# Patient Record
Sex: FEMALE | Race: WHITE | Employment: FULL TIME | URBAN - METROPOLITAN AREA
[De-identification: names, ages, dates, MRNs, and addresses within clinical notes are randomized per-mention and may not be internally consistent; named-entity substitution may affect disease eponyms.]

---

## 2018-08-19 ENCOUNTER — APPOINTMENT (OUTPATIENT)
Dept: CT IMAGING | Age: 58
End: 2018-08-19
Payer: COMMERCIAL

## 2018-08-19 ENCOUNTER — HOSPITAL ENCOUNTER (EMERGENCY)
Age: 58
Discharge: HOME | End: 2018-08-19
Attending: EMERGENCY MEDICINE
Payer: COMMERCIAL

## 2018-08-19 VITALS
HEIGHT: 63 IN | TEMPERATURE: 98.4 F | HEART RATE: 68 BPM | DIASTOLIC BLOOD PRESSURE: 63 MMHG | RESPIRATION RATE: 15 BRPM | OXYGEN SATURATION: 96 % | BODY MASS INDEX: 49.61 KG/M2 | SYSTOLIC BLOOD PRESSURE: 146 MMHG | WEIGHT: 280 LBS

## 2018-08-19 DIAGNOSIS — G44.209 TENSION HEADACHE: Primary | ICD-10-CM

## 2018-08-19 DIAGNOSIS — M62.838 NECK MUSCLE SPASM: ICD-10-CM

## 2018-08-19 DIAGNOSIS — D72.829 LEUKOCYTOSIS, UNSPECIFIED TYPE: ICD-10-CM

## 2018-08-19 LAB
ANION GAP SERPL CALCULATED.3IONS-SCNC: 11 MMOL/L (ref 7–16)
BASOPHILS ABSOLUTE: 0.07 E9/L (ref 0–0.2)
BASOPHILS RELATIVE PERCENT: 0.5 % (ref 0–2)
BUN BLDV-MCNC: 10 MG/DL (ref 6–20)
CALCIUM SERPL-MCNC: 10.6 MG/DL (ref 8.6–10.2)
CHLORIDE BLD-SCNC: 104 MMOL/L (ref 98–107)
CO2: 25 MMOL/L (ref 22–29)
CREAT SERPL-MCNC: 0.7 MG/DL (ref 0.5–1)
EOSINOPHILS ABSOLUTE: 0.03 E9/L (ref 0.05–0.5)
EOSINOPHILS RELATIVE PERCENT: 0.2 % (ref 0–6)
GFR AFRICAN AMERICAN: >60
GFR NON-AFRICAN AMERICAN: >60 ML/MIN/1.73
GLUCOSE BLD-MCNC: 113 MG/DL (ref 74–109)
HCT VFR BLD CALC: 40.5 % (ref 34–48)
HEMOGLOBIN: 13 G/DL (ref 11.5–15.5)
IMMATURE GRANULOCYTES #: 0.18 E9/L
IMMATURE GRANULOCYTES %: 1.2 % (ref 0–5)
LYMPHOCYTES ABSOLUTE: 3.45 E9/L (ref 1.5–4)
LYMPHOCYTES RELATIVE PERCENT: 22.7 % (ref 20–42)
MCH RBC QN AUTO: 27.8 PG (ref 26–35)
MCHC RBC AUTO-ENTMCNC: 32.1 % (ref 32–34.5)
MCV RBC AUTO: 86.7 FL (ref 80–99.9)
MONOCYTES ABSOLUTE: 1.19 E9/L (ref 0.1–0.95)
MONOCYTES RELATIVE PERCENT: 7.8 % (ref 2–12)
NEUTROPHILS ABSOLUTE: 10.27 E9/L (ref 1.8–7.3)
NEUTROPHILS RELATIVE PERCENT: 67.6 % (ref 43–80)
PDW BLD-RTO: 16.3 FL (ref 11.5–15)
PLATELET # BLD: 387 E9/L (ref 130–450)
PMV BLD AUTO: 10.2 FL (ref 7–12)
POTASSIUM SERPL-SCNC: 3.7 MMOL/L (ref 3.5–5)
RBC # BLD: 4.67 E12/L (ref 3.5–5.5)
SODIUM BLD-SCNC: 140 MMOL/L (ref 132–146)
WBC # BLD: 15.2 E9/L (ref 4.5–11.5)

## 2018-08-19 PROCEDURE — 85025 COMPLETE CBC W/AUTO DIFF WBC: CPT

## 2018-08-19 PROCEDURE — 6360000002 HC RX W HCPCS: Performed by: EMERGENCY MEDICINE

## 2018-08-19 PROCEDURE — 70450 CT HEAD/BRAIN W/O DYE: CPT

## 2018-08-19 PROCEDURE — 96374 THER/PROPH/DIAG INJ IV PUSH: CPT

## 2018-08-19 PROCEDURE — 99284 EMERGENCY DEPT VISIT MOD MDM: CPT

## 2018-08-19 PROCEDURE — 80048 BASIC METABOLIC PNL TOTAL CA: CPT

## 2018-08-19 PROCEDURE — 2580000003 HC RX 258: Performed by: EMERGENCY MEDICINE

## 2018-08-19 RX ORDER — CYCLOBENZAPRINE HCL 5 MG
5 TABLET ORAL 3 TIMES DAILY PRN
Qty: 12 TABLET | Refills: 0 | Status: SHIPPED | OUTPATIENT
Start: 2018-08-19 | End: 2018-08-23

## 2018-08-19 RX ORDER — HYDROXYCHLOROQUINE SULFATE 200 MG/1
200 TABLET, FILM COATED ORAL DAILY
COMMUNITY
End: 2019-10-18 | Stop reason: CLARIF

## 2018-08-19 RX ORDER — LORAZEPAM 2 MG/ML
2 INJECTION INTRAMUSCULAR ONCE
Status: COMPLETED | OUTPATIENT
Start: 2018-08-19 | End: 2018-08-19

## 2018-08-19 RX ORDER — 0.9 % SODIUM CHLORIDE 0.9 %
1000 INTRAVENOUS SOLUTION INTRAVENOUS ONCE
Status: COMPLETED | OUTPATIENT
Start: 2018-08-19 | End: 2018-08-19

## 2018-08-19 RX ORDER — SODIUM CHLORIDE 0.9 % (FLUSH) 0.9 %
10 SYRINGE (ML) INJECTION PRN
Status: DISCONTINUED | OUTPATIENT
Start: 2018-08-19 | End: 2018-08-19 | Stop reason: HOSPADM

## 2018-08-19 RX ADMIN — LORAZEPAM 2 MG: 2 INJECTION INTRAMUSCULAR; INTRAVENOUS at 12:25

## 2018-08-19 RX ADMIN — SODIUM CHLORIDE 1000 ML: 9 INJECTION, SOLUTION INTRAVENOUS at 12:07

## 2018-08-19 ASSESSMENT — ENCOUNTER SYMPTOMS
VOMITING: 0
SHORTNESS OF BREATH: 0
SINUS PRESSURE: 0
NAUSEA: 0
BLOOD IN STOOL: 0
COUGH: 0
CHEST TIGHTNESS: 0
BACK PAIN: 0
ABDOMINAL PAIN: 0
SORE THROAT: 0
SINUS PAIN: 0
DIARRHEA: 0
FACIAL SWELLING: 0
COLOR CHANGE: 0

## 2018-08-19 ASSESSMENT — PAIN SCALES - GENERAL: PAINLEVEL_OUTOF10: 8

## 2018-08-19 ASSESSMENT — PAIN DESCRIPTION - ORIENTATION: ORIENTATION: POSTERIOR

## 2018-08-19 ASSESSMENT — PAIN DESCRIPTION - LOCATION: LOCATION: HEAD;NECK

## 2018-08-19 ASSESSMENT — PAIN DESCRIPTION - DESCRIPTORS: DESCRIPTORS: ACHING

## 2018-08-19 ASSESSMENT — PAIN DESCRIPTION - PAIN TYPE: TYPE: ACUTE PAIN

## 2018-08-19 NOTE — ED PROVIDER NOTES
Patient is a 62-year-old female with a history of migraines, Crohn disease, DVT/PE, hypertension, hypothyroidism presented with a one-week history of headache. She states on Monday of last week she was seen by her PCP for a cough, started on azithromycin. On Tuesday she began developing a headache and was reevaluated, attempted conservative therapy at that time. She does take Imitrex for headaches which she said is minimally improved her symptoms. She was seen again yesterday outpatient for this headache and informed if her pain persists to go to the ER in 24 hours. She denies new or worsening symptoms. Describes her headache as a right-sided sharp like pain radiating from the back of her neck to the front of her head bilaterally. States pain is worse with head movement, lying flat, palpation. Symptoms reported as moderate to severe in severity. No tenderness over the temple. No vision changes, loss of vision, pain or eye movement. She is light and sound sensitive at this time. Was not sudden in onset and is not the worst headache of her life. No recent trauma. She is not anticoagulated. She denies numbness, tingling, weakness, vomiting, chest pain, shortness breath, abdominal pain, syncope, confusion or altered mental status, rash. No sick contacts. The history is provided by the patient. Headache   Pain location:  Frontal, occipital and R parietal  Quality:  Dull and stabbing  Severity currently:  8/10  Severity at highest:  9/10  Onset quality:  Gradual  Duration:  6 days  Timing:  Constant  Progression:  Waxing and waning  Chronicity:  Chronic  Similar to prior headaches: yes    Context: activity, bright light and loud noise    Relieved by:  Prescription medications and resting in a darkened room  Worsened by:   Activity, light, neck movement and sound  Ineffective treatments:  Cold packs  Associated symptoms: neck pain (right lateral)    Associated symptoms: no abdominal pain, no back pain, no musculature tenderness to palpation). She exhibits no edema. Lymphadenopathy:     She has no cervical adenopathy. Neurological: She is alert and oriented to person, place, and time. She has normal strength. She displays no tremor. No cranial nerve deficit or sensory deficit. She exhibits normal muscle tone. Coordination normal.   Reflex Scores:       Brachioradialis reflexes are 2+ on the right side and 2+ on the left side. Achilles reflexes are 2+ on the right side and 2+ on the left side. No nuchal rigidity, no Kernig or Brudzinski sign. Skin: Skin is warm and dry. No rash noted. She is not diaphoretic. No erythema. No pallor. Nursing note and vitals reviewed. Procedures    MDM         --------------------------------------------- PAST HISTORY ---------------------------------------------  Past Medical History:  has a past medical history of Altered bowel elimination due to intestinal ostomy ([facility] [address] Arthritis; Crohn disease ([facility] [address] Hernia; History of DVT (deep vein thrombosis); History of pulmonary embolus (PE); Hypertension; Hypothyroidism; Movement disorder; Obesity; PE (pulmonary embolism); Rash; and Thyroid disease. Past Surgical History:  has a past surgical history that includes Cholecystectomy; Tubal ligation; hernia repair; Endometrial ablation; Tonsillectomy; Colonoscopy; Abdomen surgery (11/2/14); Total knee arthroplasty (Bilateral, 2013); Gastrostomy tube placement; Tunneled venous catheter placement; colectomy; colectomy; joint replacement (11/2013); and ileostomy or jejunostomy. Social History:  reports that she has never smoked. She has never used smokeless tobacco. She reports that she does not drink alcohol or use drugs. Family History: family history includes Hypertension in her mother. The patients home medications have been reviewed. Allergies: Amoxicillin-pot clavulanate; Biaxin [clarithromycin];  Ceclor [cefaclor]; and Macrobid tablet by mouth 3 times daily as needed for Muscle spasms, Disp-12 tablet, R-0Print             Diagnosis:  1. Tension headache    2. Neck muscle spasm    3. Leukocytosis, unspecified type        Disposition:  Patient's disposition: Discharge to home  Patient's condition is stable.          Marie Aguilar DO  Resident  08/19/18 [id number]

## 2019-03-02 LAB
AVERAGE GLUCOSE: NORMAL
CHOLESTEROL, TOTAL: NORMAL
CHOLESTEROL/HDL RATIO: NORMAL
CREATININE: 0.7 MG/DL
HBA1C MFR BLD: 5.6 %
HDLC SERPL-MCNC: NORMAL MG/DL
LDL CHOLESTEROL CALCULATED: NORMAL
POTASSIUM (K+): 4.1
TRIGL SERPL-MCNC: NORMAL MG/DL
VLDLC SERPL CALC-MCNC: NORMAL MG/DL

## 2019-03-16 ENCOUNTER — HOSPITAL ENCOUNTER (OUTPATIENT)
Dept: MRI IMAGING | Age: 59
Discharge: HOME | End: 2019-03-18
Payer: COMMERCIAL

## 2019-03-16 DIAGNOSIS — M48.56XA COLLAPSED VERTEBRA, NOT ELSEWHERE CLASSIFIED, LUMBAR REGION, INITIAL ENCOUNTER FOR FRACTURE (HCC): ICD-10-CM

## 2019-03-16 PROCEDURE — 72148 MRI LUMBAR SPINE W/O DYE: CPT

## 2019-09-04 RX ORDER — LOSARTAN POTASSIUM 100 MG/1
100 TABLET ORAL DAILY
COMMUNITY
End: 2019-09-05 | Stop reason: SDUPTHER

## 2019-09-04 RX ORDER — METOPROLOL SUCCINATE 25 MG/1
25 TABLET, EXTENDED RELEASE ORAL 2 TIMES DAILY
COMMUNITY
End: 2019-09-05

## 2019-09-04 RX ORDER — INFLIXIMAB 100 MG/10ML
800 INJECTION, POWDER, LYOPHILIZED, FOR SOLUTION INTRAVENOUS SEE ADMIN INSTRUCTIONS
COMMUNITY
End: 2022-08-31

## 2019-09-05 ENCOUNTER — OFFICE VISIT (OUTPATIENT)
Dept: PRIMARY CARE CLINIC | Age: 59
End: 2019-09-05
Payer: COMMERCIAL

## 2019-09-05 VITALS
WEIGHT: 264 LBS | TEMPERATURE: 98.3 F | BODY MASS INDEX: 46.78 KG/M2 | HEIGHT: 63 IN | DIASTOLIC BLOOD PRESSURE: 84 MMHG | SYSTOLIC BLOOD PRESSURE: 136 MMHG

## 2019-09-05 DIAGNOSIS — K43.9 VENTRAL HERNIA WITHOUT OBSTRUCTION OR GANGRENE: ICD-10-CM

## 2019-09-05 DIAGNOSIS — E11.9 DIET-CONTROLLED DIABETES MELLITUS (HCC): ICD-10-CM

## 2019-09-05 DIAGNOSIS — E03.9 ACQUIRED HYPOTHYROIDISM: ICD-10-CM

## 2019-09-05 DIAGNOSIS — L30.9 DERMATITIS: ICD-10-CM

## 2019-09-05 DIAGNOSIS — M81.0 AGE-RELATED OSTEOPOROSIS WITHOUT CURRENT PATHOLOGICAL FRACTURE: ICD-10-CM

## 2019-09-05 DIAGNOSIS — Z00.01 ENCOUNTER FOR ANNUAL GENERAL MEDICAL EXAMINATION WITH ABNORMAL FINDINGS IN ADULT: Primary | ICD-10-CM

## 2019-09-05 DIAGNOSIS — I10 ESSENTIAL HYPERTENSION: Chronic | ICD-10-CM

## 2019-09-05 DIAGNOSIS — F41.9 ANXIETY: ICD-10-CM

## 2019-09-05 DIAGNOSIS — K50.10 CROHN'S DISEASE OF COLON WITHOUT COMPLICATION (HCC): Chronic | ICD-10-CM

## 2019-09-05 DIAGNOSIS — E78.2 MIXED HYPERLIPIDEMIA: Primary | ICD-10-CM

## 2019-09-05 PROCEDURE — 99396 PREV VISIT EST AGE 40-64: CPT | Performed by: FAMILY MEDICINE

## 2019-09-05 RX ORDER — ESCITALOPRAM OXALATE 20 MG/1
10 TABLET ORAL DAILY
Qty: 90 TABLET | Refills: 5 | Status: SHIPPED
Start: 2019-09-05 | End: 2020-03-05

## 2019-09-05 RX ORDER — SUMATRIPTAN 100 MG/1
100 TABLET, FILM COATED ORAL
Qty: 9 TABLET | Refills: 12 | Status: SHIPPED
Start: 2019-09-05 | End: 2021-11-29

## 2019-09-05 RX ORDER — LEVOTHYROXINE SODIUM 137 UG/1
137 TABLET ORAL DAILY
Qty: 90 TABLET | Refills: 12 | Status: SHIPPED
Start: 2019-09-05 | End: 2020-09-08 | Stop reason: SDUPTHER

## 2019-09-05 RX ORDER — TRAZODONE HYDROCHLORIDE 50 MG/1
TABLET ORAL
Qty: 180 TABLET | Refills: 5 | Status: SHIPPED
Start: 2019-09-05 | End: 2020-09-08 | Stop reason: SDUPTHER

## 2019-09-05 RX ORDER — LOSARTAN POTASSIUM 100 MG/1
100 TABLET ORAL DAILY
Qty: 90 TABLET | Refills: 5 | Status: SHIPPED
Start: 2019-09-05 | End: 2020-09-08 | Stop reason: SDUPTHER

## 2019-09-05 RX ORDER — POTASSIUM CHLORIDE 20 MEQ/1
20 TABLET, EXTENDED RELEASE ORAL 2 TIMES DAILY
Qty: 180 TABLET | Refills: 5 | Status: SHIPPED
Start: 2019-09-05 | End: 2020-09-08 | Stop reason: SDUPTHER

## 2019-09-05 ASSESSMENT — ENCOUNTER SYMPTOMS
GASTROINTESTINAL NEGATIVE: 1
ALLERGIC/IMMUNOLOGIC NEGATIVE: 1
BACK PAIN: 1
EYES NEGATIVE: 1
RESPIRATORY NEGATIVE: 1

## 2019-09-21 ENCOUNTER — OFFICE VISIT (OUTPATIENT)
Dept: FAMILY MEDICINE CLINIC | Age: 59
End: 2019-09-21
Payer: COMMERCIAL

## 2019-09-21 VITALS
WEIGHT: 264 LBS | OXYGEN SATURATION: 97 % | BODY MASS INDEX: 46.78 KG/M2 | HEIGHT: 63 IN | RESPIRATION RATE: 14 BRPM | HEART RATE: 80 BPM | DIASTOLIC BLOOD PRESSURE: 70 MMHG | SYSTOLIC BLOOD PRESSURE: 130 MMHG | TEMPERATURE: 100.2 F

## 2019-09-21 DIAGNOSIS — J01.90 ACUTE SINUSITIS, RECURRENCE NOT SPECIFIED, UNSPECIFIED LOCATION: Primary | ICD-10-CM

## 2019-09-21 PROCEDURE — 99213 OFFICE O/P EST LOW 20 MIN: CPT | Performed by: INTERNAL MEDICINE

## 2019-09-21 RX ORDER — AZITHROMYCIN 250 MG/1
250 TABLET, FILM COATED ORAL SEE ADMIN INSTRUCTIONS
Qty: 6 TABLET | Refills: 0 | Status: SHIPPED | OUTPATIENT
Start: 2019-09-21 | End: 2019-09-26

## 2019-09-21 RX ORDER — METHYLPREDNISOLONE 4 MG/1
TABLET ORAL
Qty: 1 TABLET | Refills: 0 | Status: SHIPPED | OUTPATIENT
Start: 2019-09-21 | End: 2019-09-27

## 2019-09-21 NOTE — PROGRESS NOTES
RELEASE      CHOLECYSTECTOMY      COLECTOMY      ostomy    COLECTOMY      COLONOSCOPY      ENDOMETRIAL ABLATION      GASTROSTOMY TUBE PLACEMENT      HERNIA REPAIR      ILEOSTOMY OR JEJUNOSTOMY      placed 2/2015 reversed 9/2015    JOINT REPLACEMENT  11/2013    both knees    TONSILLECTOMY      TOTAL KNEE ARTHROPLASTY Bilateral 2013    TUBAL LIGATION      TUNNELED VENOUS CATHETER PLACEMENT       Family History   Problem Relation Age of Onset    Hypertension Mother      Social History     Socioeconomic History    Marital status:      Spouse name: Not on file    Number of children: Not on file    Years of education: Not on file    Highest education level: Not on file   Occupational History     Employer: [employer]   Social Needs    Financial resource strain: Not on file    Food insecurity:     Worry: Not on file     Inability: Not on file    Transportation needs:     Medical: Not on file     Non-medical: Not on file   Tobacco Use    Smoking status: Never Smoker    Smokeless tobacco: Never Used   Substance and Sexual Activity    Alcohol use: No    Drug use: No    Sexual activity: Not on file   Lifestyle    Physical activity:     Days per week: Not on file     Minutes per session: Not on file    Stress: Not on file   Relationships    Social connections:     Talks on phone: Not on file     Gets together: Not on file     Attends Scientology service: Not on file     Active member of club or organization: Not on file     Attends meetings of clubs or organizations: Not on file     Relationship status: Not on file    Intimate partner violence:     Fear of current or ex partner: Not on file     Emotionally abused: Not on file     Physically abused: Not on file     Forced sexual activity: Not on file   Other Topics Concern    Not on file   Social History Narrative        Tetanus Immunization - (8/14/2017)    HYPOTHYROID    HTN    ELEV LFT    FATTY LIVER    S/P UMBILICAL HERNIA OR

## 2019-10-18 ENCOUNTER — OFFICE VISIT (OUTPATIENT)
Dept: SURGERY | Age: 59
End: 2019-10-18
Payer: COMMERCIAL

## 2019-10-18 VITALS
TEMPERATURE: 98 F | SYSTOLIC BLOOD PRESSURE: 152 MMHG | RESPIRATION RATE: 16 BRPM | DIASTOLIC BLOOD PRESSURE: 85 MMHG | WEIGHT: 261.6 LBS | BODY MASS INDEX: 46.35 KG/M2 | HEIGHT: 63 IN | HEART RATE: 66 BPM

## 2019-10-18 DIAGNOSIS — K43.9 VENTRAL HERNIA WITHOUT OBSTRUCTION OR GANGRENE: Primary | Chronic | ICD-10-CM

## 2019-10-18 PROCEDURE — 99203 OFFICE O/P NEW LOW 30 MIN: CPT | Performed by: SURGERY

## 2019-10-21 ENCOUNTER — TELEPHONE (OUTPATIENT)
Dept: SURGERY | Age: 59
End: 2019-10-21

## 2019-10-22 ENCOUNTER — TELEPHONE (OUTPATIENT)
Dept: SURGERY | Age: 59
End: 2019-10-22

## 2019-10-27 ENCOUNTER — HOSPITAL ENCOUNTER (OUTPATIENT)
Dept: CT IMAGING | Age: 59
Discharge: HOME | End: 2019-10-29
Payer: COMMERCIAL

## 2019-10-27 DIAGNOSIS — K43.9 VENTRAL HERNIA WITHOUT OBSTRUCTION OR GANGRENE: Chronic | ICD-10-CM

## 2019-10-27 PROCEDURE — 74177 CT ABD & PELVIS W/CONTRAST: CPT

## 2019-10-27 PROCEDURE — 6360000004 HC RX CONTRAST MEDICATION: Performed by: RADIOLOGY

## 2019-10-27 RX ADMIN — IOHEXOL 50 ML: 240 INJECTION, SOLUTION INTRATHECAL; INTRAVASCULAR; INTRAVENOUS; ORAL at 08:26

## 2019-10-27 RX ADMIN — IOPAMIDOL 110 ML: 755 INJECTION, SOLUTION INTRAVENOUS at 08:26

## 2019-11-08 ENCOUNTER — OFFICE VISIT (OUTPATIENT)
Dept: SURGERY | Age: 59
End: 2019-11-08
Payer: COMMERCIAL

## 2019-11-08 VITALS
DIASTOLIC BLOOD PRESSURE: 93 MMHG | WEIGHT: 260 LBS | HEART RATE: 78 BPM | BODY MASS INDEX: 46.07 KG/M2 | TEMPERATURE: 98.3 F | HEIGHT: 63 IN | RESPIRATION RATE: 18 BRPM | SYSTOLIC BLOOD PRESSURE: 153 MMHG

## 2019-11-08 DIAGNOSIS — K43.9 VENTRAL HERNIA WITHOUT OBSTRUCTION OR GANGRENE: Primary | ICD-10-CM

## 2019-11-08 PROCEDURE — 99213 OFFICE O/P EST LOW 20 MIN: CPT | Performed by: SURGERY

## 2019-11-14 ENCOUNTER — OFFICE VISIT (OUTPATIENT)
Dept: PRIMARY CARE CLINIC | Age: 59
End: 2019-11-14
Payer: COMMERCIAL

## 2019-11-14 ENCOUNTER — TELEPHONE (OUTPATIENT)
Dept: PRIMARY CARE CLINIC | Age: 59
End: 2019-11-14

## 2019-11-14 VITALS
SYSTOLIC BLOOD PRESSURE: 132 MMHG | DIASTOLIC BLOOD PRESSURE: 78 MMHG | BODY MASS INDEX: 46.06 KG/M2 | TEMPERATURE: 97.9 F | HEIGHT: 63 IN

## 2019-11-14 DIAGNOSIS — L30.9 DERMATITIS: Primary | ICD-10-CM

## 2019-11-14 PROCEDURE — 99213 OFFICE O/P EST LOW 20 MIN: CPT | Performed by: FAMILY MEDICINE

## 2019-11-14 RX ORDER — SULCONAZOLE NITRATE 10 MG/G
CREAM TOPICAL
Qty: 1 TUBE | Refills: 12 | Status: SHIPPED | OUTPATIENT
Start: 2019-11-14

## 2019-11-14 RX ORDER — OXICONAZOLE NITRATE 10 MG/G
CREAM TOPICAL
Qty: 1 TUBE | Refills: 12 | Status: SHIPPED | OUTPATIENT
Start: 2019-11-14

## 2019-11-14 ASSESSMENT — ENCOUNTER SYMPTOMS: RESPIRATORY NEGATIVE: 1

## 2019-11-15 ASSESSMENT — PATIENT HEALTH QUESTIONNAIRE - PHQ9
SUM OF ALL RESPONSES TO PHQ QUESTIONS 1-9: 0
1. LITTLE INTEREST OR PLEASURE IN DOING THINGS: 0
2. FEELING DOWN, DEPRESSED OR HOPELESS: 0
SUM OF ALL RESPONSES TO PHQ9 QUESTIONS 1 & 2: 0
SUM OF ALL RESPONSES TO PHQ QUESTIONS 1-9: 0

## 2020-02-10 ENCOUNTER — OFFICE VISIT (OUTPATIENT)
Dept: FAMILY MEDICINE CLINIC | Age: 60
End: 2020-02-10
Payer: COMMERCIAL

## 2020-02-10 ENCOUNTER — HOSPITAL ENCOUNTER (OUTPATIENT)
Age: 60
Discharge: HOME | End: 2020-02-12
Payer: COMMERCIAL

## 2020-02-10 VITALS
WEIGHT: 262 LBS | RESPIRATION RATE: 14 BRPM | TEMPERATURE: 99.9 F | OXYGEN SATURATION: 99 % | BODY MASS INDEX: 46.42 KG/M2 | HEART RATE: 90 BPM | DIASTOLIC BLOOD PRESSURE: 70 MMHG | SYSTOLIC BLOOD PRESSURE: 122 MMHG | HEIGHT: 63 IN

## 2020-02-10 LAB
BILIRUBIN, POC: ABNORMAL
BLOOD URINE, POC: ABNORMAL
CLARITY, POC: CLEAR
COLOR, POC: YELLOW
GLUCOSE URINE, POC: ABNORMAL
KETONES, POC: ABNORMAL
LEUKOCYTE EST, POC: ABNORMAL
NITRITE, POC: ABNORMAL
PH, POC: 6.5
PROTEIN, POC: ABNORMAL
SPECIFIC GRAVITY, POC: 1.01
UROBILINOGEN, POC: 0.2

## 2020-02-10 PROCEDURE — 87077 CULTURE AEROBIC IDENTIFY: CPT

## 2020-02-10 PROCEDURE — 87186 SC STD MICRODIL/AGAR DIL: CPT

## 2020-02-10 PROCEDURE — 81003 URINALYSIS AUTO W/O SCOPE: CPT | Performed by: FAMILY MEDICINE

## 2020-02-10 PROCEDURE — 99213 OFFICE O/P EST LOW 20 MIN: CPT | Performed by: FAMILY MEDICINE

## 2020-02-10 PROCEDURE — 87088 URINE BACTERIA CULTURE: CPT

## 2020-02-10 RX ORDER — GRANULES FOR ORAL 3 G/1
3 POWDER ORAL ONCE
Qty: 1 EACH | Refills: 0 | Status: SHIPPED | OUTPATIENT
Start: 2020-02-10 | End: 2020-02-10

## 2020-02-12 LAB
ORGANISM: ABNORMAL
URINE CULTURE, ROUTINE: ABNORMAL
URINE CULTURE, ROUTINE: ABNORMAL

## 2020-02-15 ENCOUNTER — HOSPITAL ENCOUNTER (EMERGENCY)
Age: 60
Discharge: HOME | End: 2020-02-15
Attending: EMERGENCY MEDICINE
Payer: COMMERCIAL

## 2020-02-15 VITALS
RESPIRATION RATE: 16 BRPM | TEMPERATURE: 98.2 F | OXYGEN SATURATION: 98 % | HEART RATE: 78 BPM | DIASTOLIC BLOOD PRESSURE: 78 MMHG | WEIGHT: 262 LBS | BODY MASS INDEX: 46.42 KG/M2 | SYSTOLIC BLOOD PRESSURE: 165 MMHG | HEIGHT: 63 IN

## 2020-02-15 LAB
ALBUMIN SERPL-MCNC: 4.3 G/DL (ref 3.5–5.2)
ALP BLD-CCNC: 42 U/L (ref 35–104)
ALT SERPL-CCNC: 39 U/L (ref 0–32)
ANION GAP SERPL CALCULATED.3IONS-SCNC: 12 MMOL/L (ref 7–16)
AST SERPL-CCNC: 40 U/L (ref 0–31)
BACTERIA: ABNORMAL /HPF
BASOPHILS ABSOLUTE: 0.07 E9/L (ref 0–0.2)
BASOPHILS RELATIVE PERCENT: 0.7 % (ref 0–2)
BILIRUB SERPL-MCNC: 0.4 MG/DL (ref 0–1.2)
BILIRUBIN URINE: NEGATIVE
BLOOD, URINE: ABNORMAL
BUN BLDV-MCNC: 9 MG/DL (ref 6–20)
CALCIUM SERPL-MCNC: 10 MG/DL (ref 8.6–10.2)
CHLORIDE BLD-SCNC: 100 MMOL/L (ref 98–107)
CLARITY: ABNORMAL
CO2: 25 MMOL/L (ref 22–29)
COLOR: YELLOW
CREAT SERPL-MCNC: 0.7 MG/DL (ref 0.5–1)
EOSINOPHILS ABSOLUTE: 0.4 E9/L (ref 0.05–0.5)
EOSINOPHILS RELATIVE PERCENT: 4 % (ref 0–6)
EPITHELIAL CELLS, UA: ABNORMAL /HPF
GFR AFRICAN AMERICAN: >60
GFR NON-AFRICAN AMERICAN: >60 ML/MIN/1.73
GLUCOSE BLD-MCNC: 99 MG/DL (ref 74–99)
GLUCOSE URINE: NEGATIVE MG/DL
HCT VFR BLD CALC: 38.6 % (ref 34–48)
HEMOGLOBIN: 11.8 G/DL (ref 11.5–15.5)
IMMATURE GRANULOCYTES #: 0.05 E9/L
IMMATURE GRANULOCYTES %: 0.5 % (ref 0–5)
KETONES, URINE: NEGATIVE MG/DL
LEUKOCYTE ESTERASE, URINE: ABNORMAL
LIPASE: 32 U/L (ref 13–60)
LYMPHOCYTES ABSOLUTE: 3.33 E9/L (ref 1.5–4)
LYMPHOCYTES RELATIVE PERCENT: 32.9 % (ref 20–42)
MCH RBC QN AUTO: 25.9 PG (ref 26–35)
MCHC RBC AUTO-ENTMCNC: 30.6 % (ref 32–34.5)
MCV RBC AUTO: 84.6 FL (ref 80–99.9)
MONOCYTES ABSOLUTE: 0.81 E9/L (ref 0.1–0.95)
MONOCYTES RELATIVE PERCENT: 8 % (ref 2–12)
NEUTROPHILS ABSOLUTE: 5.46 E9/L (ref 1.8–7.3)
NEUTROPHILS RELATIVE PERCENT: 53.9 % (ref 43–80)
NITRITE, URINE: NEGATIVE
PDW BLD-RTO: 18.4 FL (ref 11.5–15)
PH UA: 6.5 (ref 5–9)
PLATELET # BLD: 372 E9/L (ref 130–450)
PMV BLD AUTO: 10.3 FL (ref 7–12)
POTASSIUM REFLEX MAGNESIUM: 4 MMOL/L (ref 3.5–5)
PROTEIN UA: 100 MG/DL
RBC # BLD: 4.56 E12/L (ref 3.5–5.5)
RBC UA: ABNORMAL /HPF (ref 0–2)
SODIUM BLD-SCNC: 137 MMOL/L (ref 132–146)
SPECIFIC GRAVITY UA: 1.02 (ref 1–1.03)
TOTAL PROTEIN: 7.8 G/DL (ref 6.4–8.3)
UROBILINOGEN, URINE: 0.2 E.U./DL
WBC # BLD: 10.1 E9/L (ref 4.5–11.5)
WBC UA: ABNORMAL /HPF (ref 0–5)
YEAST: PRESENT /HPF

## 2020-02-15 PROCEDURE — 85025 COMPLETE CBC W/AUTO DIFF WBC: CPT

## 2020-02-15 PROCEDURE — 87088 URINE BACTERIA CULTURE: CPT

## 2020-02-15 PROCEDURE — 81001 URINALYSIS AUTO W/SCOPE: CPT

## 2020-02-15 PROCEDURE — 83690 ASSAY OF LIPASE: CPT

## 2020-02-15 PROCEDURE — 80053 COMPREHEN METABOLIC PANEL: CPT

## 2020-02-15 PROCEDURE — 99283 EMERGENCY DEPT VISIT LOW MDM: CPT

## 2020-02-15 RX ORDER — DULOXETIN HYDROCHLORIDE 30 MG/1
30 CAPSULE, DELAYED RELEASE ORAL DAILY
COMMUNITY

## 2020-02-15 RX ORDER — SULFAMETHOXAZOLE AND TRIMETHOPRIM 800; 160 MG/1; MG/1
1 TABLET ORAL 2 TIMES DAILY
Qty: 6 TABLET | Refills: 0 | Status: SHIPPED | OUTPATIENT
Start: 2020-02-15 | End: 2020-02-18

## 2020-02-15 RX ORDER — FLUCONAZOLE 150 MG/1
150 TABLET ORAL ONCE
Qty: 2 TABLET | Refills: 0 | Status: SHIPPED | OUTPATIENT
Start: 2020-02-15 | End: 2020-02-15

## 2020-02-15 RX ORDER — NYSTATIN 100000 [USP'U]/G
POWDER TOPICAL
Qty: 1 BOTTLE | Refills: 1 | Status: SHIPPED | OUTPATIENT
Start: 2020-02-15 | End: 2021-11-29

## 2020-02-15 ASSESSMENT — ENCOUNTER SYMPTOMS
VOMITING: 0
SHORTNESS OF BREATH: 0
CONSTIPATION: 0
RHINORRHEA: 0
ABDOMINAL PAIN: 0
NAUSEA: 0
DIARRHEA: 0

## 2020-02-15 ASSESSMENT — PAIN DESCRIPTION - PAIN TYPE: TYPE: ACUTE PAIN

## 2020-02-15 ASSESSMENT — PAIN SCALES - GENERAL: PAINLEVEL_OUTOF10: 6

## 2020-02-15 NOTE — ED PROVIDER NOTES
Patient a 16-year-old female with a history of Crohn's presents with dysuria, urgency, frequency for 1 week. Patient was seen by her family doctor with unremarkable urinalysis however urine culture growing greater than 100,000 E. coli that is pansensitive on 2/10/2020. Patient was placed on fosfomycin without any relief. Patient has been followed in the past by urology, as well as dermatology. Patient denies chest pain, shortness of breath, abdominal pain, nausea, vomiting, vertigo, syncope. Patient denies fever chills, low back pain. Patient denies travel or trauma. Review of Systems   Constitutional: Negative for appetite change, chills and fever. HENT: Negative for congestion and rhinorrhea. Eyes: Negative for visual disturbance. Respiratory: Negative for shortness of breath. Cardiovascular: Negative for chest pain, palpitations and leg swelling. Gastrointestinal: Negative for abdominal pain, constipation, diarrhea, nausea and vomiting. Genitourinary: Positive for dysuria, frequency and urgency. Musculoskeletal: Negative for arthralgias and myalgias. Skin: Positive for rash. Neurological: Negative for dizziness, syncope, weakness, numbness and headaches. All other systems reviewed and are negative. Physical Exam  Vitals signs reviewed. Constitutional:       General: She is not in acute distress. Appearance: Normal appearance. She is not ill-appearing or toxic-appearing. HENT:      Head: Normocephalic and atraumatic. Mouth/Throat:      Mouth: Mucous membranes are moist.   Cardiovascular:      Rate and Rhythm: Normal rate and regular rhythm. Pulses: Normal pulses. Heart sounds: Normal heart sounds. No murmur. No gallop. Pulmonary:      Effort: Pulmonary effort is normal. No respiratory distress. Breath sounds: Normal breath sounds. Abdominal:      General: Bowel sounds are normal.      Palpations: Abdomen is soft. Tenderness:  There is no abdominal tenderness. There is no right CVA tenderness, left CVA tenderness, guarding or rebound. Hernia: A hernia is present. Hernia is present in the umbilical area. Musculoskeletal: Normal range of motion. Skin:     General: Skin is warm and dry. Capillary Refill: Capillary refill takes less than 2 seconds. Findings: Rash present. Neurological:      Mental Status: She is alert and oriented to person, place, and time. MDM  Number of Diagnoses or Management Options  Acute cystitis with hematuria:   Intertrigo:   Vaginal yeast infection:   Diagnosis management comments: Patient is a 62-year-old who presents with symptoms concerning for urinary tract infection. Patient with positive culture noted on 2/10 and patient has been on fosfomycin per her PCP. Patient remains symptomatic and sensitivities were reviewed. Patient is pansensitive and medication will be changed today to Bactrim for 3 days, reviewing her when she is and intolerances to medications. Further evaluation shows patient with intertrigo noted around easily reducible umbilical hernia. Patient has been using cream for yeast infection under breasts with good relief however no relief around umbilicus. Patient is followed by dermatology. Will try nystatin powder for intertrigo. Rash is red, not hot, not tender. Appears in the moist area. Patient UA is generally unremarkable for infection however yeast is noted. This may be a cause for her symptoms however positive urine culture is present from 1 week ago. Patient will be given Diflucan 1 dose for treatment of yeast infection. Patient may follow-up with family doctor and neurology as needed. Patient is agreeable to plan and is stable for discharge.   Patient advises risks and when to return to the emergency department, patient voices understanding.       --------------------------------------------- PAST HISTORY ---------------------------------------------  Past Medical History:  has a past medical history of Altered bowel elimination due to intestinal ostomy ([facility] [address] Anemia, Arthritis, Crohn disease ([facility] [address] Fatty liver, Hernia, History of DVT (deep vein thrombosis), History of pulmonary embolus (PE), Hyperlipemia, Hypertension, Hypothyroidism, Intervertebral lumbar disc disorder with myelopathy, lumbar region, Leukocytosis, Movement disorder, MRSA infection, Obesity, Osteoarthritis of both knees, Palpitations, PE (pulmonary embolism), Rash, Syncope, Thyroid disease, Uterine fibroid, and Vitamin D deficiency. Past Surgical History:  has a past surgical history that includes Cholecystectomy; Tubal ligation; hernia repair; Endometrial ablation; Tonsillectomy; Colonoscopy; Abdomen surgery (11/2/14); Total knee arthroplasty (Bilateral, 2013); Gastrostomy tube placement; Tunneled venous catheter placement; colectomy; colectomy; joint replacement (11/2013); ileostomy or jejunostomy; and Carpal tunnel release. Social History:  reports that she has never smoked. She has never used smokeless tobacco. She reports that she does not drink alcohol or use drugs. Family History: family history includes Hypertension in her mother. The patients home medications have been reviewed. Allergies: Amoxicillin; Amoxicillin-pot clavulanate; Biaxin [clarithromycin]; Ceclor [cefaclor]; Clavulanic acid;  Doxycycline; and Macrobid [nitrofurantoin]    -------------------------------------------------- RESULTS -------------------------------------------------  Labs:  Results for orders placed or performed during the hospital encounter of 02/15/20   CBC Auto Differential   Result Value Ref Range    WBC 10.1 4.5 - 11.5 E9/L    RBC 4.56 3.50 - 5.50 E12/L    Hemoglobin 11.8 11.5 - 15.5 g/dL    Hematocrit 38.6 34.0 - 48.0 %    MCV 84.6 80.0 - 99.9 fL    MCH 25.9 (L) 26.0 - 35.0 pg    MCHC 30.6 (L) 32.0 - 34.5 %    RDW 18.4 (H) 11.5 - 15.0 fL    Platelets 629 487 - 431 E9/L    MPV 10.3 7.0 - 12.0 ------------------------- NURSING NOTES AND VITALS REVIEWED ---------------------------  Date / Time Roomed:  2/15/2020  5:25 AM  ED Bed Assignment:  18/18    The nursing notes within the ED encounter and vital signs as below have been reviewed. BP (!) 165/78   Pulse 78   Temp 98.2 °F (36.8 °C) (Oral)   Resp 16   Ht 5' 3\" (1.6 m)   Wt 262 lb (118.8 kg)   SpO2 98%   BMI 46.41 kg/m²   Oxygen Saturation Interpretation: Normal      ------------------------------------------ PROGRESS NOTES ------------------------------------------  6:40 AM  I have spoken with the patient and discussed todays results, in addition to providing specific details for the plan of care and counseling regarding the diagnosis and prognosis. Their questions are answered at this time and they are agreeable with the plan. I discussed at length with them reasons for immediate return here for re evaluation. They will followup with their urology, dermatology and primary care physician by calling their office on Monday.      --------------------------------- ADDITIONAL PROVIDER NOTES ---------------------------------  At this time the patient is without objective evidence of an acute process requiring hospitalization or inpatient management. They have remained hemodynamically stable throughout their entire ED visit and are stable for discharge with outpatient follow-up. The plan has been discussed in detail and they are aware of the specific conditions for emergent return, as well as the importance of follow-up. Discharge Medication List as of 2/15/2020  7:05 AM      START taking these medications    Details   sulfamethoxazole-trimethoprim (BACTRIM DS) 800-160 MG per tablet Take 1 tablet by mouth 2 times daily for 3 days, Disp-6 tablet, R-0Print      nystatin (MYCOSTATIN) 932844 UNIT/GM powder Apply topically 4 times daily. , Disp-1 Bottle, R-1, Print      fluconazole (DIFLUCAN) 150 MG tablet Take 1 tablet by mouth once for 1 dose May repeat in 3-5 days, if symptoms persist or recur., Disp-2 tablet, R-0Print             Diagnosis:  1. Acute cystitis with hematuria    2. Vaginal yeast infection    3. Intertrigo        Disposition:  Patient's disposition: Discharge to home  Patient's condition is stable.     2/15/20, 6:40 AM.    This note is prepared by Addison Hunter MD -[id number]             Addison Hunter MD  Resident  02/15/20 0809

## 2020-02-17 LAB — URINE CULTURE, ROUTINE: NORMAL

## 2020-02-29 ENCOUNTER — HOSPITAL ENCOUNTER (OUTPATIENT)
Age: 60
Discharge: HOME | End: 2020-03-02
Payer: COMMERCIAL

## 2020-02-29 LAB
ALBUMIN SERPL-MCNC: 4 G/DL (ref 3.5–5.2)
ALP BLD-CCNC: 42 U/L (ref 35–104)
ALT SERPL-CCNC: 40 U/L (ref 0–32)
ANION GAP SERPL CALCULATED.3IONS-SCNC: 15 MMOL/L (ref 7–16)
AST SERPL-CCNC: 31 U/L (ref 0–31)
BASOPHILS ABSOLUTE: 0.09 E9/L (ref 0–0.2)
BASOPHILS RELATIVE PERCENT: 0.9 % (ref 0–2)
BILIRUB SERPL-MCNC: 0.3 MG/DL (ref 0–1.2)
BUN BLDV-MCNC: 8 MG/DL (ref 6–20)
CALCIUM SERPL-MCNC: 10.1 MG/DL (ref 8.6–10.2)
CHLORIDE BLD-SCNC: 101 MMOL/L (ref 98–107)
CHOLESTEROL, TOTAL: 186 MG/DL (ref 0–199)
CO2: 25 MMOL/L (ref 22–29)
CREAT SERPL-MCNC: 0.9 MG/DL (ref 0.5–1)
EOSINOPHILS ABSOLUTE: 0.35 E9/L (ref 0.05–0.5)
EOSINOPHILS RELATIVE PERCENT: 3.6 % (ref 0–6)
GFR AFRICAN AMERICAN: >60
GFR NON-AFRICAN AMERICAN: >60 ML/MIN/1.73
GLUCOSE BLD-MCNC: 104 MG/DL (ref 74–99)
HBA1C MFR BLD: 5.9 % (ref 4–5.6)
HCT VFR BLD CALC: 40.9 % (ref 34–48)
HDLC SERPL-MCNC: 57 MG/DL
HEMOGLOBIN: 11.7 G/DL (ref 11.5–15.5)
IMMATURE GRANULOCYTES #: 0.03 E9/L
IMMATURE GRANULOCYTES %: 0.3 % (ref 0–5)
LDL CHOLESTEROL CALCULATED: 97 MG/DL (ref 0–99)
LYMPHOCYTES ABSOLUTE: 3.78 E9/L (ref 1.5–4)
LYMPHOCYTES RELATIVE PERCENT: 39 % (ref 20–42)
MCH RBC QN AUTO: 24.6 PG (ref 26–35)
MCHC RBC AUTO-ENTMCNC: 28.6 % (ref 32–34.5)
MCV RBC AUTO: 86.1 FL (ref 80–99.9)
MONOCYTES ABSOLUTE: 0.87 E9/L (ref 0.1–0.95)
MONOCYTES RELATIVE PERCENT: 9 % (ref 2–12)
NEUTROPHILS ABSOLUTE: 4.58 E9/L (ref 1.8–7.3)
NEUTROPHILS RELATIVE PERCENT: 47.2 % (ref 43–80)
OVALOCYTES: ABNORMAL
PDW BLD-RTO: 18.5 FL (ref 11.5–15)
PLATELET # BLD: 388 E9/L (ref 130–450)
PMV BLD AUTO: 10.8 FL (ref 7–12)
POIKILOCYTES: ABNORMAL
POTASSIUM SERPL-SCNC: 4.3 MMOL/L (ref 3.5–5)
RBC # BLD: 4.75 E12/L (ref 3.5–5.5)
SODIUM BLD-SCNC: 141 MMOL/L (ref 132–146)
T4 TOTAL: 12.9 MCG/DL (ref 4.5–11.7)
TOTAL PROTEIN: 7.7 G/DL (ref 6.4–8.3)
TRIGL SERPL-MCNC: 162 MG/DL (ref 0–149)
TSH SERPL DL<=0.05 MIU/L-ACNC: 1.13 UIU/ML (ref 0.27–4.2)
VITAMIN D 25-HYDROXY: 20 NG/ML (ref 30–100)
VLDLC SERPL CALC-MCNC: 32 MG/DL
WBC # BLD: 9.7 E9/L (ref 4.5–11.5)

## 2020-02-29 PROCEDURE — 80053 COMPREHEN METABOLIC PANEL: CPT

## 2020-02-29 PROCEDURE — 80061 LIPID PANEL: CPT

## 2020-02-29 PROCEDURE — 84436 ASSAY OF TOTAL THYROXINE: CPT

## 2020-02-29 PROCEDURE — 85025 COMPLETE CBC W/AUTO DIFF WBC: CPT

## 2020-02-29 PROCEDURE — 84443 ASSAY THYROID STIM HORMONE: CPT

## 2020-02-29 PROCEDURE — 83036 HEMOGLOBIN GLYCOSYLATED A1C: CPT

## 2020-02-29 PROCEDURE — 36415 COLL VENOUS BLD VENIPUNCTURE: CPT

## 2020-02-29 PROCEDURE — 82306 VITAMIN D 25 HYDROXY: CPT

## 2020-03-05 ENCOUNTER — OFFICE VISIT (OUTPATIENT)
Dept: PRIMARY CARE CLINIC | Age: 60
End: 2020-03-05
Payer: COMMERCIAL

## 2020-03-05 VITALS
SYSTOLIC BLOOD PRESSURE: 134 MMHG | BODY MASS INDEX: 46.07 KG/M2 | HEIGHT: 63 IN | TEMPERATURE: 98.2 F | WEIGHT: 260 LBS | DIASTOLIC BLOOD PRESSURE: 82 MMHG

## 2020-03-05 PROCEDURE — 99214 OFFICE O/P EST MOD 30 MIN: CPT | Performed by: FAMILY MEDICINE

## 2020-03-05 ASSESSMENT — ENCOUNTER SYMPTOMS
EYES NEGATIVE: 1
RESPIRATORY NEGATIVE: 1
ABDOMINAL PAIN: 1
ALLERGIC/IMMUNOLOGIC NEGATIVE: 1

## 2020-03-05 ASSESSMENT — PATIENT HEALTH QUESTIONNAIRE - PHQ9
SUM OF ALL RESPONSES TO PHQ QUESTIONS 1-9: 0
2. FEELING DOWN, DEPRESSED OR HOPELESS: 0
SUM OF ALL RESPONSES TO PHQ QUESTIONS 1-9: 0
SUM OF ALL RESPONSES TO PHQ9 QUESTIONS 1 & 2: 0
1. LITTLE INTEREST OR PLEASURE IN DOING THINGS: 0

## 2020-03-05 NOTE — PROGRESS NOTES
3/5/20  Name: Kady Daly    : 1960    Sex: female    Age: 64 y.o. Subjective:  Chief Complaint: Patient is here for 6 mo ck  Re  Mul isues and lab     Saw  orneals and to see   gs in [facility] and  She putting off  Seeing rheum and    Chg med   nwoon cymbalta insead of lexapro  Bit  Depressed     Lab with  D   Dec  36-20   Chol  176----186     ghb  5-6--5-9      Review of Systems   Constitutional: Negative. HENT: Negative. Eyes: Negative. Respiratory: Negative. Cardiovascular: Negative. Gastrointestinal: Positive for abdominal pain (from hernia at times). Endocrine: Negative. Genitourinary: Negative. Musculoskeletal: Negative. Skin: Negative. Allergic/Immunologic: Negative. Neurological: Negative. Hematological: Negative. Psychiatric/Behavioral: Negative. Current Outpatient Medications:     DULoxetine (CYMBALTA) 30 MG extended release capsule, Take 30 mg by mouth daily, Disp: , Rfl:     nystatin (MYCOSTATIN) 994843 UNIT/GM powder, Apply topically 4 times daily. , Disp: 1 Bottle, Rfl: 1    sulconazole (EXELDERM) 1 % cream, Apply topically bid, Disp: 1 Tube, Rfl: 12    oxiconazole nitrate (OXISTAT) 1 % CREA cream, Bid to breast fold, Disp: 1 Tube, Rfl: 12    metoprolol tartrate (LOPRESSOR) 25 MG tablet, Take 1 tablet by mouth 2 times daily, Disp: 180 tablet, Rfl: 5    traZODone (DESYREL) 50 MG tablet, 2 q hs (Patient taking differently: 40 mg 2 q hs), Disp: 180 tablet, Rfl: 5    SUMAtriptan (IMITREX) 100 MG tablet, Take 1 tablet by mouth once as needed for Migraine (1 prn headache . may repeat two hours later.  max 2 per day and 6 per week), Disp: 9 tablet, Rfl: 12    potassium chloride (KLOR-CON M) 20 MEQ extended release tablet, Take 1 tablet by mouth 2 times daily, Disp: 180 tablet, Rfl: 5    levothyroxine (SYNTHROID) 137 MCG tablet, Take 1 tablet by mouth daily, Disp: 90 tablet, Rfl: 12    losartan (COZAAR) 100 MG tablet, Take 1 tablet by mouth daily, Disp: 90 tablet, Rfl: 5    METHOTREXATE SODIUM IJ, Infuse 22.5 mg intravenously once a week On Mondays for RA, Disp: , Rfl:     inFLIXimab (REMICADE) 100 MG injection, Infuse 800 mg intravenously See Admin Instructions Indications: 800 MG every 5 weeks Over 2 hours every 8 weeks , Disp: , Rfl:     Probiotic Product (PROBIOTIC DAILY PO), Take 1 capsule by mouth daily, Disp: , Rfl:     Methotrexate, Anti-Rheumatic, (METHOTREXATE, PF, SC), Inject 1 mL into the skin once a week, Disp: , Rfl:     magnesium (MAGNESIUM-OXIDE) 250 MG TABS tablet, Take 250 mg by mouth daily, Disp: , Rfl:     Cholecalciferol (VITAMIN D3) 2000 UNITS CAPS, Take 3,000 Int'l Units by mouth daily. , Disp: , Rfl:   Allergies   Allergen Reactions    Amoxicillin Rash    Amoxicillin-Pot Clavulanate Rash    Biaxin [Clarithromycin] Hives and Rash    Ceclor [Cefaclor] Hives and Rash    Clavulanic Acid Rash    Doxycycline Rash    Macrobid [Nitrofurantoin] Nausea And Vomiting     Social History     Socioeconomic History    Marital status:      Spouse name: Not on file    Number of children: Not on file    Years of education: Not on file    Highest education level: Not on file   Occupational History     Employer: [employer]   Social Needs    Financial resource strain: Not on file    Food insecurity:     Worry: Not on file     Inability: Not on file    Transportation needs:     Medical: Not on file     Non-medical: Not on file   Tobacco Use    Smoking status: Never Smoker    Smokeless tobacco: Never Used   Substance and Sexual Activity    Alcohol use: No    Drug use: No    Sexual activity: Not on file   Lifestyle    Physical activity:     Days per week: Not on file     Minutes per session: Not on file    Stress: Not on file   Relationships    Social connections:     Talks on phone: Not on file     Gets together: Not on file     Attends Gnosticism service: Not on file     Active member of club or organization: Not on file     Attends meetings of clubs or organizations: Not on file     Relationship status: Not on file    Intimate partner violence:     Fear of current or ex partner: Not on file     Emotionally abused: Not on file     Physically abused: Not on file     Forced sexual activity: Not on file   Other Topics Concern    Not on file   Social History Narrative        Tetanus Immunization - (8/14/2017)    HYPOTHYROID    HTN    ELEV LFT    FATTY LIVER    S/P UMBILICAL HERNIA OR AND SUBSEQUEST INCISIONAL HERNIA OR 8-06    LEUKOCYTOSIS JEMMA    CTS NO OR    OA KNEES SYNVISC----DR ODONNELL    ALLERGY TO DISSOLVABLE SUTURES    UTERINE FIBROID    ----OSP---STATE half-way---Gotta'go Personal Care Device--3    WORKS [employer]    OBESITY    UTERINE ABLATION    LOW VIT D 7-10    ECHO 7 -10 STAGE ONE SYED DYSFX    PALP---DR TERRY    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     5-12----DIV 5-13    COLON 1-13 WITH CINDY---TOLD ULCERATIVE COLITIS--NO HELP WITH ASACOL AND BX NEG    DC WTIH INFL BOWEL DIS (CROHNS) AND JOINT SWELLING---REGULE IV STEROIDS---DC ON    PRED AND ANEMIA    STARTED HUMIRA 5-13    BILATERAL TOTAL KNEE DR ODONNELL 11-13    ADMIT 3-14 WITH FLARE OF CROHNS DIS    ADMIT WITH BRONCHITIS 4-14    FLARE CROHNS DIS    COLONOSCOPY 10-14 [facility]----ACTIVE CROHNS COLITIS IN SIGMOID    ADMIT 11-14 WITH VENTRAL WALL ABSCESS AND POSS FISTULA    SYNCOPE 11-14 WITH LACERATION SCALP    OR [facility] 2-11-15---- FOR PARTIAL BOWEL RESECTION--- PEG TUBE IN----DUE TO INFECTED    MESH    admit 5-15 with UTI SEPSIS    ILEOSTOMY REVERSAL 9-15 [facility]    MRSA UTI 10-15    START REMICADE 12-15    eval dr Shine Haley  3-19 with shots back   Mri with 5 herniated disc      Past Medical History:   Diagnosis Date    Altered bowel elimination due to intestinal ostomy ([facility] [address]     Anemia     Arthritis     Crohn disease ([facility] [address]     Fatty liver     Hernia     History of DVT (deep vein thrombosis) 6/10/2015    History of pulmonary embolus (PE) 6/10/2015    Hyperlipemia     Hypertension     Hypothyroidism     Intervertebral lumbar disc disorder with myelopathy, lumbar region     Leukocytosis     Movement disorder     MRSA infection     UTI    Obesity     Osteoarthritis of both knees     Palpitations     PE (pulmonary embolism)     Rash     fine skin rash not itchy gastro dr aware    Syncope     Thyroid disease     Uterine fibroid     Vitamin D deficiency      Family History   Problem Relation Age of Onset    Hypertension Mother       Past Surgical History:   Procedure Laterality Date    ABDOMEN SURGERY  11/2/14    I&d abdominal wound    CARPAL TUNNEL RELEASE      CHOLECYSTECTOMY      COLECTOMY      ostomy    COLECTOMY      COLONOSCOPY      ENDOMETRIAL ABLATION      GASTROSTOMY TUBE PLACEMENT      HERNIA REPAIR      ILEOSTOMY OR JEJUNOSTOMY      placed 2/2015 reversed 9/2015    JOINT REPLACEMENT  11/2013    both knees    TONSILLECTOMY      TOTAL KNEE ARTHROPLASTY Bilateral 2013    TUBAL LIGATION      TUNNELED VENOUS CATHETER PLACEMENT        Vitals:    03/05/20 0817   BP: 134/82   Temp: 98.2 °F (36.8 °C)   Weight: 260 lb (117.9 kg)   Height: 5' 3\" (1.6 m)       Objective:    Physical Exam  Vitals signs reviewed. Constitutional:       Appearance: She is well-developed. HENT:      Head: Normocephalic. Eyes:      Pupils: Pupils are equal, round, and reactive to light. Neck:      Musculoskeletal: Normal range of motion. Cardiovascular:      Rate and Rhythm: Normal rate and regular rhythm. Pulmonary:      Effort: Pulmonary effort is normal.      Breath sounds: Normal breath sounds. Abdominal:      Palpations: Abdomen is soft. Hernia: A hernia (large ventral hernia) is present. Musculoskeletal: Normal range of motion. Skin:     General: Skin is warm. Neurological:      Mental Status: She is alert and oriented to person, place, and time.

## 2020-08-24 ENCOUNTER — HOSPITAL ENCOUNTER (OUTPATIENT)
Age: 60
Discharge: HOME | End: 2020-08-26
Payer: COMMERCIAL

## 2020-08-24 LAB
ALBUMIN SERPL-MCNC: 4.1 G/DL (ref 3.5–5.2)
ALP BLD-CCNC: 42 U/L (ref 35–104)
ALT SERPL-CCNC: 29 U/L (ref 0–32)
ANION GAP SERPL CALCULATED.3IONS-SCNC: 12 MMOL/L (ref 7–16)
AST SERPL-CCNC: 34 U/L (ref 0–31)
BACTERIA: ABNORMAL /HPF
BASOPHILS ABSOLUTE: 0.09 E9/L (ref 0–0.2)
BASOPHILS RELATIVE PERCENT: 1 % (ref 0–2)
BILIRUB SERPL-MCNC: 0.7 MG/DL (ref 0–1.2)
BILIRUBIN URINE: NEGATIVE
BLOOD, URINE: ABNORMAL
BUN BLDV-MCNC: 9 MG/DL (ref 6–20)
CALCIUM SERPL-MCNC: 10.2 MG/DL (ref 8.6–10.2)
CHLORIDE BLD-SCNC: 99 MMOL/L (ref 98–107)
CHOLESTEROL, TOTAL: 172 MG/DL (ref 0–199)
CLARITY: CLEAR
CO2: 25 MMOL/L (ref 22–29)
COLOR: YELLOW
CREAT SERPL-MCNC: 0.8 MG/DL (ref 0.5–1)
EOSINOPHILS ABSOLUTE: 0.38 E9/L (ref 0.05–0.5)
EOSINOPHILS RELATIVE PERCENT: 4.2 % (ref 0–6)
GFR AFRICAN AMERICAN: >60
GFR NON-AFRICAN AMERICAN: >60 ML/MIN/1.73
GLUCOSE BLD-MCNC: 99 MG/DL (ref 74–99)
GLUCOSE URINE: NEGATIVE MG/DL
HBA1C MFR BLD: 5.8 % (ref 4–5.6)
HCT VFR BLD CALC: 39.9 % (ref 34–48)
HDLC SERPL-MCNC: 53 MG/DL
HEMOGLOBIN: 11.9 G/DL (ref 11.5–15.5)
IMMATURE GRANULOCYTES #: 0.04 E9/L
IMMATURE GRANULOCYTES %: 0.4 % (ref 0–5)
KETONES, URINE: NEGATIVE MG/DL
LDL CHOLESTEROL CALCULATED: 82 MG/DL (ref 0–99)
LEUKOCYTE ESTERASE, URINE: ABNORMAL
LYMPHOCYTES ABSOLUTE: 3.79 E9/L (ref 1.5–4)
LYMPHOCYTES RELATIVE PERCENT: 41.5 % (ref 20–42)
MCH RBC QN AUTO: 25 PG (ref 26–35)
MCHC RBC AUTO-ENTMCNC: 29.8 % (ref 32–34.5)
MCV RBC AUTO: 83.8 FL (ref 80–99.9)
MONOCYTES ABSOLUTE: 0.78 E9/L (ref 0.1–0.95)
MONOCYTES RELATIVE PERCENT: 8.5 % (ref 2–12)
NEUTROPHILS ABSOLUTE: 4.05 E9/L (ref 1.8–7.3)
NEUTROPHILS RELATIVE PERCENT: 44.4 % (ref 43–80)
NITRITE, URINE: NEGATIVE
PDW BLD-RTO: 18.6 FL (ref 11.5–15)
PH UA: 7 (ref 5–9)
PLATELET # BLD: 393 E9/L (ref 130–450)
PMV BLD AUTO: 10.6 FL (ref 7–12)
POTASSIUM SERPL-SCNC: 4 MMOL/L (ref 3.5–5)
PROTEIN UA: NEGATIVE MG/DL
RBC # BLD: 4.76 E12/L (ref 3.5–5.5)
RBC UA: ABNORMAL /HPF (ref 0–2)
RENAL EPITHELIAL, UA: ABNORMAL /HPF
SODIUM BLD-SCNC: 136 MMOL/L (ref 132–146)
SPECIFIC GRAVITY UA: 1.01 (ref 1–1.03)
T4 TOTAL: 15.5 MCG/DL (ref 4.5–11.7)
TOTAL PROTEIN: 7.9 G/DL (ref 6.4–8.3)
TRIGL SERPL-MCNC: 184 MG/DL (ref 0–149)
TSH SERPL DL<=0.05 MIU/L-ACNC: 0.54 UIU/ML (ref 0.27–4.2)
UROBILINOGEN, URINE: 0.2 E.U./DL
VITAMIN D 25-HYDROXY: 24 NG/ML (ref 30–100)
VLDLC SERPL CALC-MCNC: 37 MG/DL
WBC # BLD: 9.1 E9/L (ref 4.5–11.5)
WBC UA: >20 /HPF (ref 0–5)

## 2020-08-24 PROCEDURE — 85025 COMPLETE CBC W/AUTO DIFF WBC: CPT

## 2020-08-24 PROCEDURE — 81001 URINALYSIS AUTO W/SCOPE: CPT

## 2020-08-24 PROCEDURE — 82306 VITAMIN D 25 HYDROXY: CPT

## 2020-08-24 PROCEDURE — 84443 ASSAY THYROID STIM HORMONE: CPT

## 2020-08-24 PROCEDURE — 80053 COMPREHEN METABOLIC PANEL: CPT

## 2020-08-24 PROCEDURE — 84436 ASSAY OF TOTAL THYROXINE: CPT

## 2020-08-24 PROCEDURE — 83036 HEMOGLOBIN GLYCOSYLATED A1C: CPT

## 2020-08-24 PROCEDURE — 36415 COLL VENOUS BLD VENIPUNCTURE: CPT

## 2020-08-24 PROCEDURE — 80061 LIPID PANEL: CPT

## 2020-09-01 ENCOUNTER — OFFICE VISIT (OUTPATIENT)
Dept: PRIMARY CARE CLINIC | Age: 60
End: 2020-09-01
Payer: COMMERCIAL

## 2020-09-01 VITALS
WEIGHT: 241 LBS | BODY MASS INDEX: 42.69 KG/M2 | SYSTOLIC BLOOD PRESSURE: 138 MMHG | DIASTOLIC BLOOD PRESSURE: 82 MMHG | TEMPERATURE: 97.7 F

## 2020-09-01 PROCEDURE — 99214 OFFICE O/P EST MOD 30 MIN: CPT | Performed by: FAMILY MEDICINE

## 2020-09-01 RX ORDER — CEFDINIR 300 MG/1
300 CAPSULE ORAL 2 TIMES DAILY
Qty: 20 CAPSULE | Refills: 0 | Status: SHIPPED
Start: 2020-09-01 | End: 2020-09-08

## 2020-09-01 RX ORDER — HYDROCORTISONE AND ACETIC ACID 20.75; 10.375 MG/ML; MG/ML
3 SOLUTION AURICULAR (OTIC) 2 TIMES DAILY
Qty: 1 BOTTLE | Refills: 2 | Status: SHIPPED
Start: 2020-09-01 | End: 2020-09-08

## 2020-09-01 ASSESSMENT — ENCOUNTER SYMPTOMS
ROS SKIN COMMENTS: SEE  HPI
TROUBLE SWALLOWING: 0
SINUS PRESSURE: 1
RESPIRATORY NEGATIVE: 1
SINUS PAIN: 1
SORE THROAT: 0

## 2020-09-01 ASSESSMENT — PATIENT HEALTH QUESTIONNAIRE - PHQ9
2. FEELING DOWN, DEPRESSED OR HOPELESS: 0
SUM OF ALL RESPONSES TO PHQ9 QUESTIONS 1 & 2: 0
SUM OF ALL RESPONSES TO PHQ QUESTIONS 1-9: 0
SUM OF ALL RESPONSES TO PHQ QUESTIONS 1-9: 0
1. LITTLE INTEREST OR PLEASURE IN DOING THINGS: 0

## 2020-09-01 NOTE — PROGRESS NOTES
20  Name: Edward Leonard    : 1960    Sex: female    Age: 64 y.o. Subjective:  Chief Complaint: Patient is here for sinus  draigange     yel mucus nose      Left  Ear ache     No   Temp chills  No cp o rsob          Heading ok    Left antr first toe  Open area for a week after scratching it    bilat itchy rash both axiallaee  feow  fe w days      Review of Systems   Constitutional: Negative. HENT: Positive for congestion (nasal congestion), ear pain, sinus pressure and sinus pain. Negative for sore throat and trouble swallowing. Respiratory: Negative. Cardiovascular: Negative. Skin:        See  hpi         Current Outpatient Medications:     cefdinir (OMNICEF) 300 MG capsule, Take 1 capsule by mouth 2 times daily for 10 days Ok with augmentin allergy-- pt states no long er allergic to pcn per allergist, Disp: 20 capsule, Rfl: 0    acetic acid-hydrocortisone (VOSOL-HC) 1-2 % otic solution, Place 3 drops into the left ear 2 times daily for 10 days, Disp: 1 Bottle, Rfl: 2    DULoxetine (CYMBALTA) 30 MG extended release capsule, Take 30 mg by mouth daily, Disp: , Rfl:     nystatin (MYCOSTATIN) 918548 UNIT/GM powder, Apply topically 4 times daily. , Disp: 1 Bottle, Rfl: 1    sulconazole (EXELDERM) 1 % cream, Apply topically bid, Disp: 1 Tube, Rfl: 12    oxiconazole nitrate (OXISTAT) 1 % CREA cream, Bid to breast fold, Disp: 1 Tube, Rfl: 12    metoprolol tartrate (LOPRESSOR) 25 MG tablet, Take 1 tablet by mouth 2 times daily, Disp: 180 tablet, Rfl: 5    traZODone (DESYREL) 50 MG tablet, 2 q hs (Patient taking differently: 40 mg 2 q hs), Disp: 180 tablet, Rfl: 5    SUMAtriptan (IMITREX) 100 MG tablet, Take 1 tablet by mouth once as needed for Migraine (1 prn headache . may repeat two hours later.  max 2 per day and 6 per week), Disp: 9 tablet, Rfl: 12    potassium chloride (KLOR-CON M) 20 MEQ extended release tablet, Take 1 tablet by mouth 2 times daily, Disp: 180 tablet, Rfl: 5   Not on file     Attends Gnosticism service: Not on file     Active member of club or organization: Not on file     Attends meetings of clubs or organizations: Not on file     Relationship status: Not on file    Intimate partner violence     Fear of current or ex partner: Not on file     Emotionally abused: Not on file     Physically abused: Not on file     Forced sexual activity: Not on file   Other Topics Concern    Not on file   Social History Narrative        Tetanus Immunization - (8/14/2017)    HYPOTHYROID    HTN    ELEV LFT    FATTY LIVER    S/P UMBILICAL HERNIA OR AND SUBSEQUEST INCISIONAL HERNIA OR 8-06    LEUKOCYTOSIS JEMMA    CTS NO OR    OA KNEES SYNVISC----DR ODONNELL    ALLERGY TO DISSOLVABLE SUTURES    UTERINE FIBROID    ----[facility]--3    WORKS [employer]ICE DEPT    OBESITY    UTERINE ABLATION    LOW VIT D 7-10    ECHO 7 -10 STAGE ONE SYED DYSFX    PALP---DR TERRY    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     5-12----DIV 5-13    COLON 1-13 WITH YURICH---TOLD ULCERATIVE COLITIS--NO HELP WITH ASACOL AND BX NEG    DC WTIH INFL BOWEL DIS (CROHNS) AND JOINT SWELLING---REGULE IV STEROIDS---DC ON    PRED AND ANEMIA    STARTED HUMIRA 5-13    BILATERAL TOTAL KNEE DR ODONNELL 11-13    ADMIT 3-14 WITH FLARE OF CROHNS DIS    ADMIT WITH BRONCHITIS 4-14    FLARE CROHNS DIS    COLONOSCOPY 10-14 [facility]----ACTIVE CROHNS COLITIS IN SIGMOID    ADMIT 11-14 WITH VENTRAL WALL ABSCESS AND POSS FISTULA    SYNCOPE 11-14 WITH LACERATION SCALP    OR [facility] 2-11-15---- FOR PARTIAL BOWEL RESECTION--- PEG TUBE IN----DUE TO INFECTED    MESH    admit 5-15 with UTI SEPSIS    ILEOSTOMY REVERSAL 9-15 [facility]    MRSA UTI 10-15    START REMICADE 12-15    eval dr Daisy Lima  3-19 with shots back   Mri with 5 herniated disc      Past Medical History:   Diagnosis Date    Altered bowel elimination due to intestinal ostomy ([address]     Anemia     Arthritis     Cro[facility] [address]     Fatty liver     Hernia     History of DVT (deep vein thrombosis) 6/10/2015    History of pulmonary embolus (PE) 6/10/2015    Hyperlipemia     Hypertension     Hypothyroidism     Intervertebral lumbar disc disorder with myelopathy, lumbar region     Leukocytosis     Movement disorder     MRSA infection     UTI    Obesity     Osteoarthritis of both knees     Palpitations     PE (pulmonary embolism)     Rash     fine skin rash not itchy gastro dr aware    Syncope     Thyroid disease     Uterine fibroid     Vitamin D deficiency      Family History   Problem Relation Age of Onset    Hypertension Mother       Past Surgical History:   Procedure Laterality Date    ABDOMEN SURGERY  11/2/14    I&d abdominal wound    CARPAL TUNNEL RELEASE      CHOLECYSTECTOMY      COLECTOMY      ostomy    COLECTOMY      COLONOSCOPY      ENDOMETRIAL ABLATION      GASTROSTOMY TUBE PLACEMENT      HERNIA REPAIR      ILEOSTOMY OR JEJUNOSTOMY      placed 2/2015 reversed 9/2015    JOINT REPLACEMENT  11/2013    both knees    TONSILLECTOMY      TOTAL KNEE ARTHROPLASTY Bilateral 2013    TUBAL LIGATION      TUNNELED VENOUS CATHETER PLACEMENT        Vitals:    09/01/20 1512   BP: 138/82   Temp: 97.7 °F (36.5 °C)   TempSrc: Temporal   Weight: 241 lb (109.3 kg)       Objective:    Physical Exam  Vitals signs reviewed. Constitutional:       Appearance: She is well-developed. HENT:      Head: Normocephalic. Ears:      Comments: Left tm erythema  Eyes:      Pupils: Pupils are equal, round, and reactive to light. Neck:      Musculoskeletal: Normal range of motion. Cardiovascular:      Rate and Rhythm: Normal rate and regular rhythm. Pulmonary:      Effort: Pulmonary effort is normal.      Breath sounds: Normal breath sounds. Musculoskeletal: Normal range of motion.    Skin:     Comments: Left antr first toe with suma  And discuarge with mild open area incenter    Mild suma both ax Neurological:      Mental Status: She is alert and oriented to person, place, and time. Psychiatric:         Behavior: Behavior normal.         Lupe Hill was seen today for sinusitis and otalgia. Diagnoses and all orders for this visit:    Acute non-recurrent sinusitis of other sinus  -     cefdinir (OMNICEF) 300 MG capsule; Take 1 capsule by mouth 2 times daily for 10 days Ok with augmentin allergy-- pt states no long er allergic to pcn per allergist    Cellulitis of great toe, left  -     cefdinir (OMNICEF) 300 MG capsule; Take 1 capsule by mouth 2 times daily for 10 days Ok with augmentin allergy-- pt states no long er allergic to pcn per allergist    Dermatitis    Essential hypertension    Acute otitis media, unspecified otitis media type  -     acetic acid-hydrocortisone (VOSOL-HC) 1-2 % otic solution; Place 3 drops into the left ear 2 times daily for 10 days        Comments: ab     Sees  juanita ames for reg appt  May need podiatry  If not healed   A great deal of time spent reviewing medications, diet, exercise, social issues. Also reviewing the chart before entering the room with patient and finishing charting after leaving patient's room. More than half of that time was spent face to face with the patient in counseling and coordinating care. Follow Up: Return for Reg Appt.      Seen by:  Adrienne Clement DO

## 2020-09-08 ENCOUNTER — OFFICE VISIT (OUTPATIENT)
Dept: PRIMARY CARE CLINIC | Age: 60
End: 2020-09-08
Payer: COMMERCIAL

## 2020-09-08 VITALS
SYSTOLIC BLOOD PRESSURE: 132 MMHG | HEIGHT: 63 IN | BODY MASS INDEX: 43.23 KG/M2 | WEIGHT: 244 LBS | TEMPERATURE: 97.6 F | DIASTOLIC BLOOD PRESSURE: 78 MMHG

## 2020-09-08 PROBLEM — F41.9 ANXIETY: Status: ACTIVE | Noted: 2020-09-08

## 2020-09-08 PROCEDURE — 99396 PREV VISIT EST AGE 40-64: CPT | Performed by: FAMILY MEDICINE

## 2020-09-08 RX ORDER — POTASSIUM CHLORIDE 20 MEQ/1
20 TABLET, EXTENDED RELEASE ORAL 2 TIMES DAILY
Qty: 180 TABLET | Refills: 5 | Status: SHIPPED
Start: 2020-09-08 | End: 2020-12-08 | Stop reason: SDUPTHER

## 2020-09-08 RX ORDER — LEVOTHYROXINE SODIUM 0.12 MG/1
125 TABLET ORAL DAILY
Qty: 90 TABLET | Refills: 12 | Status: SHIPPED
Start: 2020-09-08 | End: 2020-12-08 | Stop reason: SDUPTHER

## 2020-09-08 RX ORDER — LOSARTAN POTASSIUM 100 MG/1
100 TABLET ORAL DAILY
Qty: 90 TABLET | Refills: 5 | Status: SHIPPED
Start: 2020-09-08 | End: 2021-01-04 | Stop reason: SDUPTHER

## 2020-09-08 RX ORDER — PREDNISONE 10 MG/1
TABLET ORAL
Qty: 18 TABLET | Refills: 0 | Status: SHIPPED
Start: 2020-09-08 | End: 2020-12-08

## 2020-09-08 RX ORDER — TRAZODONE HYDROCHLORIDE 50 MG/1
TABLET ORAL
Qty: 180 TABLET | Refills: 5 | Status: SHIPPED
Start: 2020-09-08 | End: 2021-01-04 | Stop reason: SDUPTHER

## 2020-09-08 ASSESSMENT — ENCOUNTER SYMPTOMS
EYES NEGATIVE: 1
ALLERGIC/IMMUNOLOGIC NEGATIVE: 1
GASTROINTESTINAL NEGATIVE: 1
RESPIRATORY NEGATIVE: 1

## 2020-09-08 NOTE — PROGRESS NOTES
20  Name: Sara Desir    : 1960    Sex: female    Age: 64 y.o. Subjective:  Chief Complaint: Patient is here for 6 mo  Ck   Re   bp    crohns dis    Thyroid  w eight  And nto feeling well and sore on toe    6 m ock  But also  Re ck  Form  Last week    Feels otu of sorts   No spec   Sx     Feels dry a lot    No  Cp  Or sob     Derm  Gave clinda topical for  Face  Pt request pred taper      Review of Systems   Constitutional: Negative. HENT: Negative. Eyes: Negative. Respiratory: Negative. Cardiovascular: Negative. Gastrointestinal: Negative. Endocrine: Negative. Genitourinary: Negative. Musculoskeletal: Positive for arthralgias. Skin: Negative. Blotches abd   Allergic/Immunologic: Negative. Neurological: Negative. Hematological: Negative. Psychiatric/Behavioral: Negative. Current Outpatient Medications:     traZODone (DESYREL) 50 MG tablet, 2 q hs, Disp: 180 tablet, Rfl: 5    losartan (COZAAR) 100 MG tablet, Take 1 tablet by mouth daily, Disp: 90 tablet, Rfl: 5    levothyroxine (SYNTHROID) 125 MCG tablet, Take 1 tablet by mouth daily, Disp: 90 tablet, Rfl: 12    potassium chloride (KLOR-CON M) 20 MEQ extended release tablet, Take 1 tablet by mouth 2 times daily, Disp: 180 tablet, Rfl: 5    metoprolol tartrate (LOPRESSOR) 25 MG tablet, Take 1 tablet by mouth 2 times daily, Disp: 180 tablet, Rfl: 5    DULoxetine (CYMBALTA) 30 MG extended release capsule, Take 30 mg by mouth daily, Disp: , Rfl:     nystatin (MYCOSTATIN) 739421 UNIT/GM powder, Apply topically 4 times daily. , Disp: 1 Bottle, Rfl: 1    sulconazole (EXELDERM) 1 % cream, Apply topically bid, Disp: 1 Tube, Rfl: 12    oxiconazole nitrate (OXISTAT) 1 % CREA cream, Bid to breast fold, Disp: 1 Tube, Rfl: 12    SUMAtriptan (IMITREX) 100 MG tablet, Take 1 tablet by mouth once as needed for Migraine (1 prn headache . may repeat two hours later.  max 2 per day and 6 per week), Disp: 9 tablet, Rfl: 12    inFLIXimab (REMICADE) 100 MG injection, Infuse 800 mg intravenously See Admin Instructions Indications: 800 MG every 5 weeks Over 2 hours every 8 weeks , Disp: , Rfl:     Methotrexate, Anti-Rheumatic, (METHOTREXATE, PF, SC), Inject 1 mL into the skin once a week, Disp: , Rfl:     magnesium (MAGNESIUM-OXIDE) 250 MG TABS tablet, Take 250 mg by mouth daily, Disp: , Rfl:     Cholecalciferol (VITAMIN D3) 2000 UNITS CAPS, Take 3,000 Int'l Units by mouth daily. , Disp: , Rfl:   Allergies   Allergen Reactions    Amoxicillin-Pot Clavulanate Rash    Biaxin [Clarithromycin] Hives and Rash    Cefaclor Hives and Rash     rash    Clavulanic Acid Rash    Doxycycline Rash    Macrobid [Nitrofurantoin] Nausea And Vomiting     Social History     Socioeconomic History    Marital status:      Spouse name: Not on file    Number of children: Not on file    Years of education: Not on file    Highest education level: Not on file   Occupational History     Employer: [employer]   Social Needs    Financial resource strain: Not on file    Food insecurity     Worry: Not on file     Inability: Not on file    Transportation needs     Medical: Not on file     Non-medical: Not on file   Tobacco Use    Smoking status: Never Smoker    Smokeless tobacco: Never Used   Substance and Sexual Activity    Alcohol use: No    Drug use: No    Sexual activity: Not on file   Lifestyle    Physical activity     Days per week: Not on file     Minutes per session: Not on file    Stress: Not on file   Relationships    Social connections     Talks on phone: Not on file     Gets together: Not on file     Attends Rastafari service: Not on file     Active member of club or organization: Not on file     Attends meetings of clubs or organizations: Not on file     Relationship status: Not on file    Intimate partner violence     Fear of current or ex partner: Not on file     Emotionally abused: Not on file Physically abused: Not on file     Forced sexual activity: Not on file   Other Topics Concern    Not on file   Social History Narrative        Tetanus Immunization - (8/14/2017)    HYPOTHYROID    HTN    ELEV LFT    FATTY LIVER    S/P UMBILICAL HERNIA OR AND SUBSEQUEST INCISIONAL HERNIA OR 8-06    LEUKOCYTOSIS JEMMA    CTS NO OR    OA KNEES SYNVISC----DR ODONNELL    ALLERGY TO DISSOLVABLE SUTURES    UTERINE FIBROID    ----OSP---[facility]    --3    WORKS [employer] DEPT    OBESITY    UTERINE ABLATION    LOW VIT D 7-10    ECHO 7 -10 STAGE ONE SYED DYSFX    PALP---DR TERRY    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     5-12----DIV 5-13    COLON 1-13 WITH YURICH---TOLD ULCERATIVE COLITIS--NO HELP WITH ASACOL AND BX NEG    DC WTIH INFL BOWEL DIS (CROHNS) AND JOINT SWELLING---REGULE IV STEROIDS---DC ON    PRED AND ANEMIA    STARTED HUMIRA 5-13    BILATERAL TOTAL KNEE DR ODONNELL 11-13    ADMIT 3-14 WITH FLARE OF CROHNS DIS    ADMIT WITH BRONCHITIS 4-14    FLARE CROHNS DIS    COLONOSCOPY 10-14 [facility]----ACTIVE CROHNS COLITIS IN SIGMOID    ADMIT 11-14 WITH VENTRAL WALL ABSCESS AND POSS FISTULA    SYNCOPE 11-14 WITH LACERATION SCALP    OR [facility] 2-11-15---- FOR PARTIAL BOWEL RESECTION--- PEG TUBE IN----DUE TO INFECTED    MESH    admit 5-15 with UTI SEPSIS    ILEOSTOMY REVERSAL 9-15 [facility]    MRSA UTI 10-15    START REMICADE 12-15    eval dr Beverly Albert  3-19 with shots back   Mri with 5 herniated disc      Past Medical History:   Diagnosis Date    Altered bowel elimination due to intestinal ostomy ([address]     Anemia     Arthritis     Crohn disease ([address]     Fatty liver     Hernia     History of DVT (deep vein thrombosis) 6/10/2015    History of pulmonary embolus (PE) 6/10/2015    Hyperlipemia     Hypertension     Hypothyroidism     Intervertebral lumbar disc disorder with myelopathy, lumbar region     Leukocytosis     Movement disorder     MRSA infection     UTI    Obesity     Osteoarthritis of both knees     Palpitations     PE (pulmonary embolism)     Rash     fine skin rash not itchy gastro dr aware    Syncope     Thyroid disease     Uterine fibroid     Vitamin D deficiency      Family History   Problem Relation Age of Onset    Hypertension Mother       Past Surgical History:   Procedure Laterality Date    ABDOMEN SURGERY  11/2/14    I&d abdominal wound    CARPAL TUNNEL RELEASE      CHOLECYSTECTOMY      COLECTOMY      ostomy    COLECTOMY      COLONOSCOPY      ENDOMETRIAL ABLATION      GASTROSTOMY TUBE PLACEMENT      HERNIA REPAIR      ILEOSTOMY OR JEJUNOSTOMY      placed 2/2015 reversed 9/2015    JOINT REPLACEMENT  11/2013    both knees    TONSILLECTOMY      TOTAL KNEE ARTHROPLASTY Bilateral 2013    TUBAL LIGATION      TUNNELED VENOUS CATHETER PLACEMENT        Vitals:    09/08/20 0642   BP: 132/78   Temp: 97.6 °F (36.4 °C)   Weight: 244 lb (110.7 kg)   Height: 5' 3\" (1.6 m)       Objective:    Physical Exam  Vitals signs reviewed. Constitutional:       Appearance: She is well-developed. HENT:      Head: Normocephalic. Eyes:      Pupils: Pupils are equal, round, and reactive to light. Neck:      Musculoskeletal: Normal range of motion. Cardiovascular:      Rate and Rhythm: Normal rate and regular rhythm. Pulmonary:      Effort: Pulmonary effort is normal.      Breath sounds: Normal breath sounds. Abdominal:      Palpations: Abdomen is soft. Musculoskeletal: Normal range of motion. Skin:     Comments: Dry patches bialt  abd   Neurological:      Mental Status: She is alert and oriented to person, place, and time. Psychiatric:         Behavior: Behavior normal.         Jamarcus was seen today for discuss labs.     Diagnoses and all orders for this visit:    Encounter for annual general medical examination with abnormal findings in adult    Anxiety  -     traZODone (DESYREL) 50 MG tablet; 2 q hs    Essential hypertension  -     losartan (COZAAR) 100 MG tablet; Take 1 tablet by mouth daily  -     potassium chloride (KLOR-CON M) 20 MEQ extended release tablet; Take 1 tablet by mouth 2 times daily  -     metoprolol tartrate (LOPRESSOR) 25 MG tablet; Take 1 tablet by mouth 2 times daily    Acquired hypothyroidism  -     levothyroxine (SYNTHROID) 125 MCG tablet; Take 1 tablet by mouth daily    Crohn's disease of colon without complication ([address]    Dermatitis        Comments: dec synthroid dose  fuwith  GI  Derm    Sx nikolas  Hm isuses   No urine  Sx A great deal of time spent reviewing medications, diet, exercise, social issues. Also reviewing the chart before entering the room with patient and finishing charting after leaving patient's room. More than half of that time was spent face to face with the patient in counseling and coordinating care. Ck bp  weekely at home      wanr risk  Of  biloigcs        Follow Up: Return in about 3 months (around 12/8/2020) for Lab Before.      Seen by:  Stacey Hassan DO

## 2020-10-15 ENCOUNTER — OFFICE VISIT (OUTPATIENT)
Dept: PRIMARY CARE CLINIC | Age: 60
End: 2020-10-15
Payer: COMMERCIAL

## 2020-10-15 VITALS — DIASTOLIC BLOOD PRESSURE: 82 MMHG | SYSTOLIC BLOOD PRESSURE: 134 MMHG | TEMPERATURE: 97.9 F

## 2020-10-15 PROCEDURE — 99213 OFFICE O/P EST LOW 20 MIN: CPT | Performed by: FAMILY MEDICINE

## 2020-10-15 RX ORDER — AZITHROMYCIN 250 MG/1
250 TABLET, FILM COATED ORAL SEE ADMIN INSTRUCTIONS
Qty: 6 TABLET | Refills: 0 | Status: SHIPPED | OUTPATIENT
Start: 2020-10-15 | End: 2020-10-20

## 2020-10-15 RX ORDER — BETAMETHASONE DIPROPIONATE 0.5 MG/G
OINTMENT TOPICAL
Qty: 1 TUBE | Refills: 12 | Status: SHIPPED | OUTPATIENT
Start: 2020-10-15 | End: 2020-11-14

## 2020-10-15 RX ORDER — PREDNISONE 10 MG/1
TABLET ORAL
Qty: 18 TABLET | Refills: 0 | Status: SHIPPED
Start: 2020-10-15 | End: 2020-12-08

## 2020-10-15 ASSESSMENT — PATIENT HEALTH QUESTIONNAIRE - PHQ9
1. LITTLE INTEREST OR PLEASURE IN DOING THINGS: 0
SUM OF ALL RESPONSES TO PHQ QUESTIONS 1-9: 0
SUM OF ALL RESPONSES TO PHQ9 QUESTIONS 1 & 2: 0
SUM OF ALL RESPONSES TO PHQ QUESTIONS 1-9: 0
2. FEELING DOWN, DEPRESSED OR HOPELESS: 0
SUM OF ALL RESPONSES TO PHQ QUESTIONS 1-9: 0

## 2020-10-15 ASSESSMENT — ENCOUNTER SYMPTOMS
RESPIRATORY NEGATIVE: 1
ALLERGIC/IMMUNOLOGIC NEGATIVE: 1
EYES NEGATIVE: 1
GASTROINTESTINAL NEGATIVE: 1

## 2020-10-15 NOTE — PROGRESS NOTES
10/15/20  Name: Beronica Fontana    : 1960    Sex: female    Age: 64 y.o. Subjective:  Chief Complaint: Patient is here for ear and eye     Left ear pain and mucus  Form psoriais      Right eye irritaiton    For  Week    Crusty in am  jus t saw ey edoc and said use otc  drops      Review of Systems   Constitutional: Negative. HENT: Positive for ear pain. Eyes: Negative. Hpi   Respiratory: Negative. Cardiovascular: Negative. Gastrointestinal: Negative. Endocrine: Negative. Genitourinary: Negative. Musculoskeletal: Negative. Skin: Negative. Allergic/Immunologic: Negative. Neurological: Negative. Hematological: Negative. Psychiatric/Behavioral: Negative. Current Outpatient Medications:     predniSONE (DELTASONE) 10 MG tablet, ONE TID FOR THREE DAYS, ONE BID FOR THREE DAYS, ONE QD FOR THREE DAYS   FOOD, Disp: 18 tablet, Rfl: 0    augmented betamethasone dipropionate (DIPROLENE) 0.05 % ointment, Apply topically 2 times daily. , Disp: 1 Tube, Rfl: 12    neomycin-polymyxin-dexamethasone (MAXITROL) 0.1 % ophthalmic suspension, Place 1 drop into the right eye 4 times daily, Disp: 5 mL, Rfl: 0    azithromycin (ZITHROMAX) 250 MG tablet, Take 1 tablet by mouth See Admin Instructions for 5 days 500mg on day 1 followed by 250mg on days 2 - 5, Disp: 6 tablet, Rfl: 0    traZODone (DESYREL) 50 MG tablet, 2 q hs, Disp: 180 tablet, Rfl: 5    losartan (COZAAR) 100 MG tablet, Take 1 tablet by mouth daily, Disp: 90 tablet, Rfl: 5    levothyroxine (SYNTHROID) 125 MCG tablet, Take 1 tablet by mouth daily, Disp: 90 tablet, Rfl: 12    potassium chloride (KLOR-CON M) 20 MEQ extended release tablet, Take 1 tablet by mouth 2 times daily, Disp: 180 tablet, Rfl: 5    metoprolol tartrate (LOPRESSOR) 25 MG tablet, Take 1 tablet by mouth 2 times daily, Disp: 180 tablet, Rfl: 5    predniSONE (DELTASONE) 10 MG tablet, ONE TID FOR THREE DAYS, ONE BID FOR THREE DAYS, ONE QD FOR THREE DAYS   FOOD, Disp: 18 tablet, Rfl: 0    DULoxetine (CYMBALTA) 30 MG extended release capsule, Take 30 mg by mouth daily, Disp: , Rfl:     nystatin (MYCOSTATIN) 419069 UNIT/GM powder, Apply topically 4 times daily. , Disp: 1 Bottle, Rfl: 1    sulconazole (EXELDERM) 1 % cream, Apply topically bid, Disp: 1 Tube, Rfl: 12    oxiconazole nitrate (OXISTAT) 1 % CREA cream, Bid to breast fold, Disp: 1 Tube, Rfl: 12    SUMAtriptan (IMITREX) 100 MG tablet, Take 1 tablet by mouth once as needed for Migraine (1 prn headache . may repeat two hours later. max 2 per day and 6 per week), Disp: 9 tablet, Rfl: 12    inFLIXimab (REMICADE) 100 MG injection, Infuse 800 mg intravenously See Admin Instructions Indications: 800 MG every 5 weeks Over 2 hours every 8 weeks , Disp: , Rfl:     Methotrexate, Anti-Rheumatic, (METHOTREXATE, PF, SC), Inject 1 mL into the skin once a week, Disp: , Rfl:     magnesium (MAGNESIUM-OXIDE) 250 MG TABS tablet, Take 250 mg by mouth daily, Disp: , Rfl:     Cholecalciferol (VITAMIN D3) 2000 UNITS CAPS, Take 3,000 Int'l Units by mouth daily. , Disp: , Rfl:   Allergies   Allergen Reactions    Amoxicillin-Pot Clavulanate Rash    Biaxin [Clarithromycin] Hives and Rash    Cefaclor Hives and Rash     rash    Clavulanic Acid Rash    Doxycycline Rash    Macrobid [Nitrofurantoin] Nausea And Vomiting     Social History     Socioeconomic History    Marital status:      Spouse name: Not on file    Number of children: Not on file    Years of education: Not on file    Highest education level: Not on file   Occupational History     Employer: [employer]   Social Needs    Financial resource strain: Not on file    Food insecurity     Worry: Not on file     Inability: Not on file    Transportation needs     Medical: Not on file     Non-medical: Not on file   Tobacco Use    Smoking status: Never Smoker    Smokeless tobacco: Never Used   Substance and Sexual Activity    Alcohol use: No    Drug use: No    Sexual activity: Not on file   Lifestyle    Physical activity     Days per week: Not on file     Minutes per session: Not on file    Stress: Not on file   Relationships    Social connections     Talks on phone: Not on file     Gets together: Not on file     Attends Yarsanism service: Not on file     Active member of club or organization: Not on file     Attends meetings of clubs or organizations: Not on file     Relationship status: Not on file    Intimate partner violence     Fear of current or ex partner: Not on file     Emotionally abused: Not on file     Physically abused: Not on file     Forced sexual activity: Not on file   Other Topics Concern    Not on file   Social History Narrative        Tetanus Immunization - (8/14/2017)    HYPOTHYROID    HTN    ELEV LFT    FATTY LIVER    S/P UMBILICAL HERNIA OR AND SUBSEQUEST INCISIONAL HERNIA OR 8-06    LEUKOCYTOSIS JEMMA    CTS NO OR    OA KNEES SYNVISC----DR ODONNELL    ALLERGY TO DISSOLVABLE SUTURES    UTERINE FIBROID    ----OSP---[employer] alf---[place]--3    WORKS [employer] DEPT    OBESITY    UTERINE ABLATION    LOW VIT D 7-10    ECHO 7 -10 STAGE ONE SYED DYSFX    PALP---DR TERRY    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     5-12----DIV 5-13    COLON 1-13 WITH YURICH---TOLD ULCERATIVE COLITIS--NO HELP WITH ASACOL AND BX NEG    DC WTIH INFL BOWEL DIS (CROHNS) AND JOINT SWELLING---REGULE IV STEROIDS---DC ON    PRED AND ANEMIA    STARTED HUMIRA 5-13    BILATERAL TOTAL KNEE DR ODONNELL 11-13    ADMIT 3-14 WITH FLARE OF CROHNS DIS    ADMIT WITH BRONCHITIS 4-14    FLARE CROHNS DIS    COLONOSCOPY 10-14 [facility]----ACTIVE CROHNS COLITIS IN SIGMOID    ADMIT 11-14 WITH VENTRAL WALL ABSCESS AND POSS FISTULA    SYNCOPE 11-14 WITH LACERATION SCALP    OR [facility] 2-11-15---- FOR PARTIAL BOWEL RESECTION--- PEG TUBE IN----DUE TO INFECTED    MESH    admit 5-15 with UTI SEPSIS ILEOSTOMY REVERSAL 9-15 [facility]    MRSA UTI 10-15    START REMICADE 12-15    eval dr Meraz Eliane  3-19 with shots back   Mri with 5 herniated disc      Past Medical History:   Diagnosis Date    [address]tered bowel elimination [facility] [address] ostomy ([facility] [address]     Anemia     Arthritis     Crohn disease ([facility] [address]     Fatty liver     Hernia     History of DVT (deep vein thrombosis) 6/10/2015    History of pulmonary embolus (PE) 6/10/2015    Hyperlipemia     Hypertension     Hypothyroidism     Intervertebral lumbar disc disorder with myelopathy, lumbar region     Leukocytosis     Movement disorder     MRSA infection     UTI    Obesity     Osteoarthritis of both knees     Palpitations     PE (pulmonary embolism)     Rash     fine skin rash not itchy gastro dr aware    Syncope     Thyroid disease     Uterine fibroid     Vitamin D deficiency      Family History   Problem Relation Age of Onset    Hypertension Mother       Past Surgical History:   Procedure Laterality Date    ABDOMEN SURGERY  11/2/14    I&d abdominal wound    CARPAL TUNNEL RELEASE      CHOLECYSTECTOMY      COLECTOMY      ostomy    COLECTOMY      COLONOSCOPY      ENDOMETRIAL ABLATION      GASTROSTOMY TUBE PLACEMENT      HERNIA REPAIR      ILEOSTOMY OR JEJUNOSTOMY      placed 2/2015 reversed 9/2015    JOINT REPLACEMENT  11/2013    both knees    TONSILLECTOMY      TOTAL KNEE ARTHROPLASTY Bilateral 2013    TUBAL LIGATION      TUNNELED VENOUS CATHETER PLACEMENT        Vitals:    10/15/20 1004   BP: 134/82   Temp: 97.9 °F (36.6 °C)   Weight: Comment: declined       Objective:    Physical Exam  HENT:      Ears:      Comments: Left canal  Suma   Eyes:      Comments: Right conj  Suma and lwoe lid  suma   Cardiovascular:      Rate and Rhythm: Normal rate and regular rhythm. Pulses: Normal pulses. Heart sounds: Normal heart sounds. Skin:     Comments: Scalp psorisis               Martin Román was seen today for otalgia and other.     Diagnoses and all orders for this visit:    Psoriasis  -     predniSONE (DELTASONE) 10 MG tablet; ONE TID FOR THREE DAYS, ONE BID FOR THREE DAYS, ONE QD FOR THREE DAYS   FOOD  -     augmented betamethasone dipropionate (DIPROLENE) 0.05 % ointment; Apply topically 2 times daily. Acute bacterial conjunctivitis of right eye  -     neomycin-polymyxin-dexamethasone (MAXITROL) 0.1 % ophthalmic suspension; Place 1 drop into the right eye 4 times daily    Recurrent acute suppurative otitis media without spontaneous rupture of left tympanic membrane  -     azithromycin (ZITHROMAX) 250 MG tablet; Take 1 tablet by mouth See Admin Instructions for 5 days 500mg on day 1 followed by 250mg on days 2 - 5  -     Norma Ordonez MD, [facility]        Comments: MED  NOT G REAT SEE      To see  D erm  appt ent  A great deal of time spent reviewing medications, diet, exercise, social issues. Also reviewing the chart before entering the room with patient and finishing charting after leaving patient's room. More than half of that time was spent face to face with the patient in counseling and coordinating care.          Follow Up: Return for Reg Appt, See Referral.     Seen by:  Sara Gilmore DO

## 2020-11-17 ENCOUNTER — TELEPHONE (OUTPATIENT)
Dept: PRIMARY CARE CLINIC | Age: 60
End: 2020-11-17

## 2020-11-17 RX ORDER — SULFAMETHOXAZOLE AND TRIMETHOPRIM 800; 160 MG/1; MG/1
1 TABLET ORAL 2 TIMES DAILY
Qty: 14 TABLET | Refills: 0 | Status: SHIPPED | OUTPATIENT
Start: 2020-11-17 | End: 2020-11-24

## 2020-11-17 NOTE — TELEPHONE ENCOUNTER
Tell her I sent Bactrim. It is not my first choice but she has so many allergies.   Tell her get to [place]  To a  hospital [facility] or [place] as soon as possible

## 2020-11-17 NOTE — TELEPHONE ENCOUNTER
Best thing to do would be to go to the emergency room but if she declines that I can send an antibiotic enough to get her back to town.

## 2020-11-17 NOTE — TELEPHONE ENCOUNTER
Pt is out of town in [place]. She said that her hernia leaking fluid. She wants to know what she should do. I advised pt that she should go to the er. Pt does not want to go to a hospital down there. It is a 6 hour drive home if you think she needs to come home. She will go to [facility] and be set up for surgery there. Pt is asking if an antibiotic can be sent in for her until she gets home. Please advise.

## 2020-11-17 NOTE — TELEPHONE ENCOUNTER
Pt notified. Does not wish to go to ER out of town. Would like Rx sent to pharmacy.   Will find out pharmacy name and fax number and call back with information

## 2020-12-07 DIAGNOSIS — M81.0 AGE-RELATED OSTEOPOROSIS WITHOUT CURRENT PATHOLOGICAL FRACTURE: ICD-10-CM

## 2020-12-07 DIAGNOSIS — D50.8 OTHER IRON DEFICIENCY ANEMIA: ICD-10-CM

## 2020-12-07 DIAGNOSIS — I10 ESSENTIAL HYPERTENSION: Chronic | ICD-10-CM

## 2020-12-07 DIAGNOSIS — R73.03 PRE-DIABETES: ICD-10-CM

## 2020-12-07 DIAGNOSIS — E03.9 ACQUIRED HYPOTHYROIDISM: ICD-10-CM

## 2020-12-07 DIAGNOSIS — Z00.01 ENCOUNTER FOR ANNUAL GENERAL MEDICAL EXAMINATION WITH ABNORMAL FINDINGS IN ADULT: ICD-10-CM

## 2020-12-07 LAB
ALBUMIN SERPL-MCNC: 4.2 G/DL (ref 3.5–5.2)
ALP BLD-CCNC: 43 U/L (ref 35–104)
ALT SERPL-CCNC: 29 U/L (ref 0–32)
ANION GAP SERPL CALCULATED.3IONS-SCNC: 13 MMOL/L (ref 7–16)
AST SERPL-CCNC: 27 U/L (ref 0–31)
BASOPHILS ABSOLUTE: 0.08 E9/L (ref 0–0.2)
BASOPHILS RELATIVE PERCENT: 0.7 % (ref 0–2)
BILIRUB SERPL-MCNC: 0.5 MG/DL (ref 0–1.2)
BUN BLDV-MCNC: 13 MG/DL (ref 6–20)
CALCIUM SERPL-MCNC: 10.5 MG/DL (ref 8.6–10.2)
CHLORIDE BLD-SCNC: 105 MMOL/L (ref 98–107)
CHOLESTEROL, TOTAL: 191 MG/DL (ref 0–199)
CO2: 26 MMOL/L (ref 22–29)
CREAT SERPL-MCNC: 0.8 MG/DL (ref 0.5–1)
EOSINOPHILS ABSOLUTE: 0.19 E9/L (ref 0.05–0.5)
EOSINOPHILS RELATIVE PERCENT: 1.7 % (ref 0–6)
GFR AFRICAN AMERICAN: >60
GFR NON-AFRICAN AMERICAN: >60 ML/MIN/1.73
GLUCOSE BLD-MCNC: 102 MG/DL (ref 74–99)
HBA1C MFR BLD: 5.7 % (ref 4–5.6)
HCT VFR BLD CALC: 38.4 % (ref 34–48)
HDLC SERPL-MCNC: 74 MG/DL
HEMOGLOBIN: 11.4 G/DL (ref 11.5–15.5)
IMMATURE GRANULOCYTES #: 0.05 E9/L
IMMATURE GRANULOCYTES %: 0.4 % (ref 0–5)
LDL CHOLESTEROL CALCULATED: 93 MG/DL (ref 0–99)
LYMPHOCYTES ABSOLUTE: 3.22 E9/L (ref 1.5–4)
LYMPHOCYTES RELATIVE PERCENT: 28.3 % (ref 20–42)
MCH RBC QN AUTO: 24.7 PG (ref 26–35)
MCHC RBC AUTO-ENTMCNC: 29.7 % (ref 32–34.5)
MCV RBC AUTO: 83.3 FL (ref 80–99.9)
MONOCYTES ABSOLUTE: 0.76 E9/L (ref 0.1–0.95)
MONOCYTES RELATIVE PERCENT: 6.7 % (ref 2–12)
NEUTROPHILS ABSOLUTE: 7.09 E9/L (ref 1.8–7.3)
NEUTROPHILS RELATIVE PERCENT: 62.2 % (ref 43–80)
PDW BLD-RTO: 18.3 FL (ref 11.5–15)
PLATELET # BLD: 441 E9/L (ref 130–450)
PMV BLD AUTO: 10.3 FL (ref 7–12)
POTASSIUM SERPL-SCNC: 4.5 MMOL/L (ref 3.5–5)
RBC # BLD: 4.61 E12/L (ref 3.5–5.5)
SODIUM BLD-SCNC: 144 MMOL/L (ref 132–146)
TOTAL PROTEIN: 7.8 G/DL (ref 6.4–8.3)
TRIGL SERPL-MCNC: 121 MG/DL (ref 0–149)
TSH SERPL DL<=0.05 MIU/L-ACNC: 2.44 UIU/ML (ref 0.27–4.2)
VITAMIN D 25-HYDROXY: 23 NG/ML (ref 30–100)
VLDLC SERPL CALC-MCNC: 24 MG/DL
WBC # BLD: 11.4 E9/L (ref 4.5–11.5)

## 2020-12-08 ENCOUNTER — OFFICE VISIT (OUTPATIENT)
Dept: PRIMARY CARE CLINIC | Age: 60
End: 2020-12-08
Payer: COMMERCIAL

## 2020-12-08 VITALS
HEIGHT: 63 IN | TEMPERATURE: 98 F | BODY MASS INDEX: 43.59 KG/M2 | WEIGHT: 246 LBS | DIASTOLIC BLOOD PRESSURE: 82 MMHG | SYSTOLIC BLOOD PRESSURE: 134 MMHG

## 2020-12-08 PROCEDURE — 99214 OFFICE O/P EST MOD 30 MIN: CPT | Performed by: FAMILY MEDICINE

## 2020-12-08 RX ORDER — LEVOTHYROXINE SODIUM 0.12 MG/1
125 TABLET ORAL DAILY
Qty: 90 TABLET | Refills: 3 | Status: SHIPPED | OUTPATIENT
Start: 2020-12-08 | End: 2021-11-29 | Stop reason: SDUPTHER

## 2020-12-08 RX ORDER — POTASSIUM CHLORIDE 20 MEQ/1
20 TABLET, EXTENDED RELEASE ORAL 2 TIMES DAILY
Qty: 180 TABLET | Refills: 5 | Status: SHIPPED | OUTPATIENT
Start: 2020-12-08 | End: 2021-01-04 | Stop reason: SDUPTHER

## 2020-12-08 ASSESSMENT — PATIENT HEALTH QUESTIONNAIRE - PHQ9
SUM OF ALL RESPONSES TO PHQ QUESTIONS 1-9: 0
SUM OF ALL RESPONSES TO PHQ9 QUESTIONS 1 & 2: 0
2. FEELING DOWN, DEPRESSED OR HOPELESS: 0
1. LITTLE INTEREST OR PLEASURE IN DOING THINGS: 0
SUM OF ALL RESPONSES TO PHQ QUESTIONS 1-9: 0
SUM OF ALL RESPONSES TO PHQ QUESTIONS 1-9: 0

## 2020-12-08 ASSESSMENT — ENCOUNTER SYMPTOMS
RESPIRATORY NEGATIVE: 1
EYES NEGATIVE: 1
ALLERGIC/IMMUNOLOGIC NEGATIVE: 1
ROS SKIN COMMENTS: SEE  HPI
GASTROINTESTINAL NEGATIVE: 1

## 2020-12-08 NOTE — PROGRESS NOTES
20  Name: Janette Garcias    : 1960    Sex: female    Age: 64 y.o. Subjective:  Chief Complaint: Patient is here for 3 mo c k  Re   thryoid     bp     psoriais     Wt up   2 lbs   S[facility]  Derm  [facility] and  Gave new med     They feel remicade not helping and may chg  dontwant to ch gnow due to    crohsn  Stable  Small hope still  But  cc has her packing it---saw surgeon  In [facility]  [facility] dec  Chol    Admitted few weks ago due to  fistual   hernia      Review of Systems   Constitutional: Negative. HENT: Negative. Eyes: Negative. Respiratory: Negative. Cardiovascular: Negative. Gastrointestinal: Negative. Endocrine: Negative. Genitourinary: Negative. Musculoskeletal: Negative. Skin:        See  hpi   Allergic/Immunologic: Negative. Neurological: Negative. Hematological: Negative. Psychiatric/Behavioral: Negative. Current Outpatient Medications:     levothyroxine (SYNTHROID) 125 MCG tablet, Take 1 tablet by mouth daily, Disp: 90 tablet, Rfl: 3    potassium chloride (KLOR-CON M) 20 MEQ extended release tablet, Take 1 tablet by mouth 2 times daily, Disp: 180 tablet, Rfl: 5    neomycin-polymyxin-dexamethasone (MAXITROL) 0.1 % ophthalmic suspension, Place 1 drop into the right eye 4 times daily, Disp: 5 mL, Rfl: 0    traZODone (DESYREL) 50 MG tablet, 2 q hs, Disp: 180 tablet, Rfl: 5    losartan (COZAAR) 100 MG tablet, Take 1 tablet by mouth daily, Disp: 90 tablet, Rfl: 5    metoprolol tartrate (LOPRESSOR) 25 MG tablet, Take 1 tablet by mouth 2 times daily, Disp: 180 tablet, Rfl: 5    DULoxetine (CYMBALTA) 30 MG extended release capsule, Take 30 mg by mouth daily, Disp: , Rfl:     nystatin (MYCOSTATIN) 914366 UNIT/GM powder, Apply topically 4 times daily. , Disp: 1 Bottle, Rfl: 1    sulconazole (EXELDERM) 1 % cream, Apply topically bid, Disp: 1 Tube, Rfl: 12    oxiconazole nitrate (OXISTAT) 1 % CREA cream, Bid to breast fold, Disp: 1 Tube, Rfl: 12   SUMAtriptan (IMITREX) 100 MG tablet, Take 1 tablet by mouth once as needed for Migraine (1 prn headache . may repeat two hours later. max 2 per day and 6 per week), Disp: 9 tablet, Rfl: 12    inFLIXimab (REMICADE) 100 MG injection, Infuse 800 mg intravenously See Admin Instructions Indications: 800 MG every 5 weeks Over 2 hours every 8 weeks , Disp: , Rfl:     Methotrexate, Anti-Rheumatic, (METHOTREXATE, PF, SC), Inject 1 mL into the skin once a week, Disp: , Rfl:     magnesium (MAGNESIUM-OXIDE) 250 MG TABS tablet, Take 250 mg by mouth daily, Disp: , Rfl:     Cholecalciferol (VITAMIN D3) 2000 UNITS CAPS, Take 3,000 Int'l Units by mouth daily. , Disp: , Rfl:   Allergies   Allergen Reactions    Amoxicillin-Pot Clavulanate Rash    Biaxin [Clarithromycin] Hives and Rash    Cefaclor Hives and Rash     rash    Clavulanic Acid Rash    Doxycycline Rash    Macrobid [Nitrofurantoin] Nausea And Vomiting     Social History     Socioeconomic History    Marital status:      Spouse name: Not on file    Number of children: Not on file    Years of education: Not on file    Highest education level: Not on file   Occupational History     Employer: [employer]   Social Needs    Financial resource strain: Not on file    Food insecurity     Worry: Not on file     Inability: Not on file    Transportation needs     Medical: Not on file     Non-medical: Not on file   Tobacco Use    Smoking status: Never Smoker    Smokeless tobacco: Never Used   Substance and Sexual Activity    Alcohol use: No    Drug use: No    Sexual activity: Not on file   Lifestyle    Physical activity     Days per week: Not on file     Minutes per session: Not on file    Stress: Not on file   Relationships    Social connections     Talks on phone: Not on file     Gets together: Not on file     Attends Yazidi service: Not on file     Active member of club or organization: Not on file     Attends meetings of clubs or organizations: Not on file     Relationship status: Not on file    Intimate partner violence     Fear of current or ex partner: Not on file     Emotionally abused: Not on file     Physically abused: Not on file     Forced sexual activity: Not on file   Other Topics Concern    Not on file   Social History Narrative        Tetanus Immunization - (8/14/2017)    HYPOTHYROID    HTN    ELEV LFT    FATTY LIVER    S/P UMBILICAL HERNIA OR AND SUBSEQUEST INCISIONAL HERNIA OR 8-06    LEUKOCYTOSIS JEMMA    CTS NO OR    OA KNEES SYNVISC----DR ODONNELL    ALLERGY TO DISSOLVABLE SUTURES    UTERINE FIBROID    ----[facility]---[facility]    [facility]--3    WORKS [employer] DE[employer]    OBESITY    UTERINE ABLATION    LOW VIT D 7-10    ECHO 7 -10 STAGE ONE SYED DYSFX    PALP---DR TERRY    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     5-12----DIV 5-13    COLON 1-13 WITH YUMIGEL---TOLD ULCERATIVE COLITIS--NO HELP WITH ASACOL AND BX NEG    DC WTIH INFL BOWEL DIS (CROHNS) AND JOINT SWELLING---REGULE IV STEROIDS---DC ON    PRED AND ANEMIA    STARTED HUMIRA 5-13    BILATERAL TOTAL KNEE DR ODONNELL 11-13    ADMIT 3-14 WITH FLARE OF CROHNS DIS    ADMIT WITH BRONCHITIS 4-14    FLARE CROHNS DIS    COLONOSCOPY 10-14 [facility]----ACTIVE CROHNS COLITIS IN SIGMOID    ADMIT 11-14 WITH VENTRAL WALL ABSCESS AND POSS FISTULA    SYNCOPE 11-14 WITH LACERATION SCALP    OR [facility] 2-11-15---- FOR PARTIAL BOWEL RESECTION--- PEG TUBE IN----DUE TO INFECTED    MESH    admit 5-15 with UTI SEPSIS    ILEOSTOMY REVERSAL 9-15 [facility]    MRSA UTI 10-15    START REMICADE 12-15    eval dr Manju Layne  3-19 with shots back   Mri with 5 herniated disc    Admit  11-20  With  Ventral hernia fistula  At  [facility]      Past Medical History:   Diagnosis Date    Altered bowel elimination due to intestinal ostomy ([facility] [address]     Anemia     Arthritis     Crohn disease ([address]     Fatty liver     Hernia     History of DVT (deep vein thrombosis) 6/10/2015    History of pulmonary embolus (PE) 6/10/2015    Hyperlipemia     Hypertension     Hypothyroidism     Intervertebral lumbar disc disorder with myelopathy, lumbar region     Leukocytosis     Movement disorder     MRSA infection     UTI    Obesity     Osteoarthritis of both knees     Palpitations     PE (pulmonary embolism)     Rash     fine skin rash not itchy gastro dr aware    Syncope     Thyroid disease     Uterine fibroid     Vitamin D deficiency      Family History   Problem Relation Age of Onset    Hypertension Mother       Past Surgical History:   Procedure Laterality Date    ABDOMEN SURGERY  11/2/14    I&d abdominal wound    CARPAL TUNNEL RELEASE      CHOLECYSTECTOMY      COLECTOMY      ostomy    COLECTOMY      COLONOSCOPY      ENDOMETRIAL ABLATION      GASTROSTOMY TUBE PLACEMENT      HERNIA REPAIR      ILEOSTOMY OR JEJUNOSTOMY      placed 2/2015 reversed 9/2015    JOINT REPLACEMENT  11/2013    both knees    TONSILLECTOMY      TOTAL KNEE ARTHROPLASTY Bilateral 2013    TUBAL LIGATION      TUNNELED VENOUS CATHETER PLACEMENT        Vitals:    12/08/20 0646   BP: 134/82   Temp: 98 °F (36.7 °C)   Weight: 246 lb (111.6 kg)   Height: 5' 3\" (1.6 m)       Objective:    Physical Exam  Vitals signs reviewed. Constitutional:       Appearance: She is well-developed. HENT:      Head: Normocephalic. Eyes:      Pupils: Pupils are equal, round, and reactive to light. Neck:      Musculoskeletal: Normal range of motion. Cardiovascular:      Rate and Rhythm: Normal rate and regular rhythm. Pulmonary:      Effort: Pulmonary effort is normal.      Breath sounds: Normal breath sounds. Abdominal:      Palpations: Abdomen is soft. Musculoskeletal: Normal range of motion. Skin:     Comments: patches   Neurological:      Mental Status: She is alert and oriented to person, place, and time.    Psychiatric:         Behavior: Behavior normal.         Flores Mosqueda was seen today for discuss labs. Diagnoses and all orders for this visit:    Essential hypertension  -     potassium chloride (KLOR-CON M) 20 MEQ extended release tablet; Take 1 tablet by mouth 2 times daily    Acquired hypothyroidism  -     levothyroxine (SYNTHROID) 125 MCG tablet; Take 1 tablet by mouth daily    Enterocutaneous fistula    Crohn's disease of colon without complication (HCC)    Class 3 severe obesity due to excess calories with serious comorbidity and body mass index (BMI) of 40.0 to 44.9 in adult [facility])        Comments: diet exer  Hm siuse  fuwith [facility]     Derm  GI   H, isus lsoe wt  Ck bs qid  A great deal of time spent reviewing medications, diet, exercise, social issues. Also reviewing the chart before entering the room with patient and finishing charting after leaving patient's room. More than half of that time was spent face to face with the patient in counseling and coordinating care. Ck bp daily     Shayan if  GI isuses  Chg   Wt watchers       Follow Up: Return in about 4 months (around 4/8/2021).      Seen by:  Liya Mckeon,

## 2020-12-09 LAB
IRON: 49 MCG/DL (ref 37–145)
T4 TOTAL: 13.3 MCG/DL (ref 4.5–11.7)

## 2021-01-04 DIAGNOSIS — I10 ESSENTIAL HYPERTENSION: Chronic | ICD-10-CM

## 2021-01-04 DIAGNOSIS — F41.9 ANXIETY: ICD-10-CM

## 2021-01-04 RX ORDER — LOSARTAN POTASSIUM 100 MG/1
100 TABLET ORAL DAILY
Qty: 90 TABLET | Refills: 5 | Status: SHIPPED
Start: 2021-01-04 | End: 2021-03-01 | Stop reason: SDUPTHER

## 2021-01-04 RX ORDER — POTASSIUM CHLORIDE 20 MEQ/1
20 TABLET, EXTENDED RELEASE ORAL 2 TIMES DAILY
Qty: 180 TABLET | Refills: 5 | Status: SHIPPED
Start: 2021-01-04 | End: 2022-03-16 | Stop reason: SDUPTHER

## 2021-01-04 RX ORDER — TRAZODONE HYDROCHLORIDE 50 MG/1
TABLET ORAL
Qty: 180 TABLET | Refills: 5 | Status: SHIPPED
Start: 2021-01-04 | End: 2022-03-19

## 2021-03-01 DIAGNOSIS — I10 ESSENTIAL HYPERTENSION: Chronic | ICD-10-CM

## 2021-03-01 RX ORDER — LOSARTAN POTASSIUM 100 MG/1
100 TABLET ORAL DAILY
Qty: 90 TABLET | Refills: 5 | Status: SHIPPED
Start: 2021-03-01 | End: 2022-03-16 | Stop reason: SDUPTHER

## 2021-04-02 DIAGNOSIS — M81.0 AGE-RELATED OSTEOPOROSIS WITHOUT CURRENT PATHOLOGICAL FRACTURE: ICD-10-CM

## 2021-04-02 DIAGNOSIS — I10 ESSENTIAL HYPERTENSION: Chronic | ICD-10-CM

## 2021-04-02 DIAGNOSIS — K63.2 ENTEROCUTANEOUS FISTULA: Chronic | ICD-10-CM

## 2021-04-02 DIAGNOSIS — R73.03 PRE-DIABETES: ICD-10-CM

## 2021-04-02 DIAGNOSIS — E03.9 ACQUIRED HYPOTHYROIDISM: ICD-10-CM

## 2021-04-02 DIAGNOSIS — K50.10 CROHN'S DISEASE OF COLON WITHOUT COMPLICATION (HCC): Chronic | ICD-10-CM

## 2021-04-02 LAB
ALBUMIN SERPL-MCNC: 4.3 G/DL (ref 3.5–5.2)
ALP BLD-CCNC: 51 U/L (ref 35–104)
ALT SERPL-CCNC: 26 U/L (ref 0–32)
ANION GAP SERPL CALCULATED.3IONS-SCNC: 12 MMOL/L (ref 7–16)
ANISOCYTOSIS: ABNORMAL
AST SERPL-CCNC: 26 U/L (ref 0–31)
BACTERIA: ABNORMAL /HPF
BASOPHILS ABSOLUTE: 0.07 E9/L (ref 0–0.2)
BASOPHILS RELATIVE PERCENT: 0.9 % (ref 0–2)
BILIRUB SERPL-MCNC: 0.5 MG/DL (ref 0–1.2)
BILIRUBIN URINE: NEGATIVE
BLOOD, URINE: NEGATIVE
BUN BLDV-MCNC: 10 MG/DL (ref 6–20)
CALCIUM SERPL-MCNC: 10.4 MG/DL (ref 8.6–10.2)
CHLORIDE BLD-SCNC: 102 MMOL/L (ref 98–107)
CHOLESTEROL, TOTAL: 149 MG/DL (ref 0–199)
CLARITY: ABNORMAL
CO2: 25 MMOL/L (ref 22–29)
COLOR: YELLOW
CREAT SERPL-MCNC: 0.8 MG/DL (ref 0.5–1)
EOSINOPHILS ABSOLUTE: 0.48 E9/L (ref 0.05–0.5)
EOSINOPHILS RELATIVE PERCENT: 6.1 % (ref 0–6)
GFR AFRICAN AMERICAN: >60
GFR NON-AFRICAN AMERICAN: >60 ML/MIN/1.73
GLUCOSE BLD-MCNC: 92 MG/DL (ref 74–99)
GLUCOSE URINE: NEGATIVE MG/DL
HBA1C MFR BLD: 5.6 % (ref 4–5.6)
HCT VFR BLD CALC: 39.3 % (ref 34–48)
HDLC SERPL-MCNC: 47 MG/DL
HEMOGLOBIN: 11.5 G/DL (ref 11.5–15.5)
KETONES, URINE: NEGATIVE MG/DL
LDL CHOLESTEROL CALCULATED: 74 MG/DL (ref 0–99)
LEUKOCYTE ESTERASE, URINE: ABNORMAL
LYMPHOCYTES ABSOLUTE: 2.65 E9/L (ref 1.5–4)
LYMPHOCYTES RELATIVE PERCENT: 33.9 % (ref 20–42)
MCH RBC QN AUTO: 23.6 PG (ref 26–35)
MCHC RBC AUTO-ENTMCNC: 29.3 % (ref 32–34.5)
MCV RBC AUTO: 80.7 FL (ref 80–99.9)
MONOCYTES ABSOLUTE: 1.09 E9/L (ref 0.1–0.95)
MONOCYTES RELATIVE PERCENT: 13.9 % (ref 2–12)
NEUTROPHILS ABSOLUTE: 3.51 E9/L (ref 1.8–7.3)
NEUTROPHILS RELATIVE PERCENT: 45.2 % (ref 43–80)
NITRITE, URINE: NEGATIVE
NUCLEATED RED BLOOD CELLS: 0.9 /100 WBC
OVALOCYTES: ABNORMAL
PDW BLD-RTO: 19.6 FL (ref 11.5–15)
PH UA: 6 (ref 5–9)
PLATELET # BLD: 430 E9/L (ref 130–450)
PMV BLD AUTO: 11 FL (ref 7–12)
POIKILOCYTES: ABNORMAL
POLYCHROMASIA: ABNORMAL
POTASSIUM SERPL-SCNC: 4.9 MMOL/L (ref 3.5–5)
PROTEIN UA: ABNORMAL MG/DL
RBC # BLD: 4.87 E12/L (ref 3.5–5.5)
RBC UA: ABNORMAL /HPF (ref 0–2)
SODIUM BLD-SCNC: 139 MMOL/L (ref 132–146)
SPECIFIC GRAVITY UA: 1.02 (ref 1–1.03)
T4 TOTAL: 13.4 MCG/DL (ref 4.5–11.7)
TOTAL PROTEIN: 8.3 G/DL (ref 6.4–8.3)
TRIGL SERPL-MCNC: 142 MG/DL (ref 0–149)
TSH SERPL DL<=0.05 MIU/L-ACNC: 4.61 UIU/ML (ref 0.27–4.2)
UROBILINOGEN, URINE: 1 E.U./DL
VITAMIN D 25-HYDROXY: 32 NG/ML (ref 30–100)
VLDLC SERPL CALC-MCNC: 28 MG/DL
WBC # BLD: 7.8 E9/L (ref 4.5–11.5)
WBC UA: >20 /HPF (ref 0–5)

## 2021-04-08 ENCOUNTER — OFFICE VISIT (OUTPATIENT)
Dept: PRIMARY CARE CLINIC | Age: 61
End: 2021-04-08
Payer: COMMERCIAL

## 2021-04-08 VITALS
HEIGHT: 63 IN | WEIGHT: 232 LBS | DIASTOLIC BLOOD PRESSURE: 82 MMHG | BODY MASS INDEX: 41.11 KG/M2 | TEMPERATURE: 98.9 F | SYSTOLIC BLOOD PRESSURE: 132 MMHG

## 2021-04-08 DIAGNOSIS — N30.00 ACUTE CYSTITIS WITHOUT HEMATURIA: ICD-10-CM

## 2021-04-08 DIAGNOSIS — K43.9 VENTRAL HERNIA WITHOUT OBSTRUCTION OR GANGRENE: Chronic | ICD-10-CM

## 2021-04-08 DIAGNOSIS — K50.10 CROHN'S DISEASE OF COLON WITHOUT COMPLICATION (HCC): Chronic | ICD-10-CM

## 2021-04-08 DIAGNOSIS — R73.03 PRE-DIABETES: ICD-10-CM

## 2021-04-08 DIAGNOSIS — M81.0 AGE-RELATED OSTEOPOROSIS WITHOUT CURRENT PATHOLOGICAL FRACTURE: ICD-10-CM

## 2021-04-08 DIAGNOSIS — I10 ESSENTIAL HYPERTENSION: Primary | Chronic | ICD-10-CM

## 2021-04-08 DIAGNOSIS — E03.9 ACQUIRED HYPOTHYROIDISM: Chronic | ICD-10-CM

## 2021-04-08 PROCEDURE — G8428 CUR MEDS NOT DOCUMENT: HCPCS | Performed by: FAMILY MEDICINE

## 2021-04-08 PROCEDURE — 1036F TOBACCO NON-USER: CPT | Performed by: FAMILY MEDICINE

## 2021-04-08 PROCEDURE — G8417 CALC BMI ABV UP PARAM F/U: HCPCS | Performed by: FAMILY MEDICINE

## 2021-04-08 PROCEDURE — 3017F COLORECTAL CA SCREEN DOC REV: CPT | Performed by: FAMILY MEDICINE

## 2021-04-08 PROCEDURE — 99214 OFFICE O/P EST MOD 30 MIN: CPT | Performed by: FAMILY MEDICINE

## 2021-04-08 ASSESSMENT — ENCOUNTER SYMPTOMS
RESPIRATORY NEGATIVE: 1
ALLERGIC/IMMUNOLOGIC NEGATIVE: 1
GASTROINTESTINAL NEGATIVE: 1
EYES NEGATIVE: 1

## 2021-04-08 ASSESSMENT — PATIENT HEALTH QUESTIONNAIRE - PHQ9
SUM OF ALL RESPONSES TO PHQ QUESTIONS 1-9: 0
SUM OF ALL RESPONSES TO PHQ9 QUESTIONS 1 & 2: 0
1. LITTLE INTEREST OR PLEASURE IN DOING THINGS: 0

## 2021-04-08 NOTE — PROGRESS NOTES
(EXELDERM) 1 % cream, Apply topically bid, Disp: 1 Tube, Rfl: 12    oxiconazole nitrate (OXISTAT) 1 % CREA cream, Bid to breast fold, Disp: 1 Tube, Rfl: 12    SUMAtriptan (IMITREX) 100 MG tablet, Take 1 tablet by mouth once as needed for Migraine (1 prn headache . may repeat two hours later. max 2 per day and 6 per week), Disp: 9 tablet, Rfl: 12    inFLIXimab (REMICADE) 100 MG injection, Infuse 800 mg intravenously See Admin Instructions Indications: 800 MG every 5 weeks Over 2 hours every 8 weeks , Disp: , Rfl:     Methotrexate, Anti-Rheumatic, (METHOTREXATE, PF, SC), Inject 1 mL into the skin once a week, Disp: , Rfl:     magnesium (MAGNESIUM-OXIDE) 250 MG TABS tablet, Take 250 mg by mouth daily, Disp: , Rfl:     Cholecalciferol (VITAMIN D3) 2000 UNITS CAPS, Take 3,000 Int'l Units by mouth daily. , Disp: , Rfl:   Allergies   Allergen Reactions    Amoxicillin-Pot Clavulanate Rash    Biaxin [Clarithromycin] Hives and Rash    Cefaclor Hives and Rash     rash    Clavulanic Acid Rash    Doxycycline Rash    Macrobid [Nitrofurantoin] Nausea And Vomiting     Social History     Socioeconomic History    Marital status:      Spouse name: Not on file    Number of children: Not on file    Years of education: Not on file    Highest education level: Not on file   Occupational History     Employer: [employer]   Social Needs    Financial resource strain: Not on file    Food insecurity     Worry: Not on file     Inability: Not on file    Transportation needs     Medical: Not on file     Non-medical: Not on file   Tobacco Use    Smoking status: Never Smoker    Smokeless tobacco: Never Used   Substance and Sexual Activity    Alcohol use: No    Drug use: No    Sexual activity: Not on file   Lifestyle    Physical activity     Days per week: Not on file     Minutes per session: Not on file    Stress: Not on file   Relationships    Social connections     Talks on phone: Not on file     Gets together: Not on file     Attends Nondenominational service: Not on file     Active member of club or organization: Not on file     Attends meetings of clubs or organizations: Not on file     Relationship status: Not on file    Intimate partner violence     Fear of current or ex partner: Not on file     Emotionally abused: Not on file     Physically abused: Not on file     Forced sexual activity: Not on file   Other Topics Concern    Not on file   Social History Narrative        Tetanus Immunization - (8/14/2017)    HYPOTHYROID    HTN    ELEV LFT    FATTY LIVER    S/P UMBILICAL HERNIA OR AND SUBSEQUEST INCISIONAL HERNIA OR 8-06    LEUKOCYTOSIS JEMMA    CTS NO OR    OA KNEES SYNVISC----DR ODONNELL    ALLERGY TO DISSOLVABLE SUTURES    UTERINE FIBROID    ----OSP---[facility]---milog--3    WORKS [employer]    OBESITY    UTERINE ABLATION    LOW VIT D 7-10    ECHO 7 -10 STAGE ONE SYED DYSFX    PALP---DR TERRY    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     5-12----DIV 5-13    COLON 1-13 WITH YURICH---TOLD ULCERATIVE COLITIS--NO HELP WITH ASACOL AND BX NEG    DC WTIH INFL BOWEL DIS (CROHNS) AND JOINT SWELLING---REGULE IV STEROIDS---DC ON    PRED AND ANEMIA    STARTED HUMIRA 5-13    BILATERAL TOTAL KNEE DR ODONNELL 11-13    ADMIT 3-14 WITH FLARE OF CROHNS DIS    ADMIT WITH BRONCHITIS 4-14    FLARE CROHNS DIS    COLONOSCOPY 10-14 [facility]----ACTIVE CROHNS COLITIS IN SIGMOID    ADMIT 11-14 WITH VENTRAL WALL ABSCESS AND POSS FISTULA    SYNCOPE 11-14 WITH LACERATION SCALP    OR [facility] 2-11-15---- FOR PARTIAL BOWEL RESECTION--- PEG TUBE IN----DUE TO INFECTED    MESH    admit 5-15 with UTI SEPSIS    ILEOSTOMY REVERSAL 9-15 [facility]    MRSA UTI 10-15    START REMICADE 12-15    sandra Bashir  3-19 with shots back   Mri with 5 herniated disc    Admit  11-20  With  Ventral hernia fistula  At  [facility]    Joshua oliveros from [facility]  [place]    biochem Son engaged      Past Medical History:   Diagnosis Date    Altered bowel elimination due to intestinal ostomy [address] [address]     Anemia     Arthritis     Crohn disease ([facility] [address]     Fatty liver     Hernia     History of DVT (deep vein thrombosis) 6/10/2015    History of pulmonary embolus (PE) 6/10/2015    Hyperlipemia     Hypertension     Hypothyroidism     Intervertebral lumbar disc disorder with myelopathy, lumbar region     Leukocytosis     Movement disorder     MRSA infection     UTI    Obesity     Osteoarthritis of both knees     Palpitations     PE (pulmonary embolism)     Rash     fine skin rash not itchy gastro dr aware    Syncope     Thyroid disease     Uterine fibroid     Vitamin D deficiency      Family History   Problem Relation Age of Onset    Hypertension Mother       Past Surgical History:   Procedure Laterality Date    ABDOMEN SURGERY  11/2/14    I&d abdominal wound    CARPAL TUNNEL RELEASE      CHOLECYSTECTOMY      COLECTOMY      ostomy    COLECTOMY      COLONOSCOPY      ENDOMETRIAL ABLATION      GASTROSTOMY TUBE PLACEMENT      HERNIA REPAIR      ILEOSTOMY OR JEJUNOSTOMY      placed 2/2015 reversed 9/2015    JOINT REPLACEMENT  11/2013    both knees    TONSILLECTOMY      TOTAL KNEE ARTHROPLASTY Bilateral 2013    TUBAL LIGATION      TUNNELED VENOUS CATHETER PLACEMENT        Vitals:    04/08/21 0643   BP: 132/82   Temp: 98.9 °F (37.2 °C)   TempSrc: Oral   Weight: 232 lb (105.2 kg)   Height: 5' 3\" (1.6 m)       Objective:    Physical Exam  Vitals signs reviewed. Constitutional:       Appearance: She is well-developed. HENT:      Head: Normocephalic. Eyes:      Pupils: Pupils are equal, round, and reactive to light. Neck:      Musculoskeletal: Normal range of motion. Cardiovascular:      Rate and Rhythm: Normal rate and regular rhythm. Pulmonary:      Effort: Pulmonary effort is normal.      Breath sounds: Normal breath sounds.    Abdominal:      Palpations: Abdomen is soft. Hernia: A hernia (masive rightmied and   um  hernia) is present. Musculoskeletal: Normal range of motion. Skin:     General: Skin is warm. Neurological:      Mental Status: She is alert and oriented to person, place, and time. Psychiatric:         Behavior: Behavior normal.         [place] was seen today for discuss labs. Diagnoses and all orders for this visit:    Essential hypertension    Crohn's disease of colon without complication ([address]    Acquired hypothyroidism    Ventral hernia without obstruction or gangrene        Comments: diet eer hm isses lsoe wt  exer fuwith [facility]  Gi dept     Ck bsp  dialy     Take thryoid med ithot food   Watch tsh     cll if  hermia pain flare    A great deal of time spent reviewing medications, diet, exercise, social issues. Also reviewing the chart before entering the room with patient and finishing charting after leaving patient's room. More than half of that time was spent face to face with the patient in counseling and coordinating care. Follow Up: Return in about 4 months (around 8/8/2021) for Lab Before    for wellness visit.      Seen by:  Nhi Berman DO

## 2021-06-18 ENCOUNTER — OFFICE VISIT (OUTPATIENT)
Dept: FAMILY MEDICINE CLINIC | Age: 61
End: 2021-06-18
Payer: COMMERCIAL

## 2021-06-18 VITALS
HEART RATE: 97 BPM | BODY MASS INDEX: 36.86 KG/M2 | SYSTOLIC BLOOD PRESSURE: 142 MMHG | HEIGHT: 63 IN | DIASTOLIC BLOOD PRESSURE: 80 MMHG | WEIGHT: 208 LBS | TEMPERATURE: 97.8 F | OXYGEN SATURATION: 97 %

## 2021-06-18 DIAGNOSIS — J01.10 ACUTE NON-RECURRENT FRONTAL SINUSITIS: Primary | ICD-10-CM

## 2021-06-18 PROCEDURE — 99213 OFFICE O/P EST LOW 20 MIN: CPT | Performed by: FAMILY MEDICINE

## 2021-06-18 PROCEDURE — 1036F TOBACCO NON-USER: CPT | Performed by: FAMILY MEDICINE

## 2021-06-18 PROCEDURE — 3017F COLORECTAL CA SCREEN DOC REV: CPT | Performed by: FAMILY MEDICINE

## 2021-06-18 PROCEDURE — G8427 DOCREV CUR MEDS BY ELIG CLIN: HCPCS | Performed by: FAMILY MEDICINE

## 2021-06-18 PROCEDURE — G8417 CALC BMI ABV UP PARAM F/U: HCPCS | Performed by: FAMILY MEDICINE

## 2021-06-18 RX ORDER — CETIRIZINE HYDROCHLORIDE 10 MG/1
10 TABLET ORAL DAILY
Qty: 30 TABLET | Refills: 1 | Status: SHIPPED
Start: 2021-06-18 | End: 2021-11-29

## 2021-06-18 RX ORDER — METHYLPREDNISOLONE 4 MG/1
TABLET ORAL
Qty: 1 KIT | Refills: 0 | Status: SHIPPED
Start: 2021-06-18 | End: 2021-11-29

## 2021-06-18 RX ORDER — SULFAMETHOXAZOLE AND TRIMETHOPRIM 800; 160 MG/1; MG/1
1 TABLET ORAL 2 TIMES DAILY
Qty: 20 TABLET | Refills: 0 | Status: SHIPPED
Start: 2021-06-18 | End: 2021-11-29

## 2021-06-18 ASSESSMENT — ENCOUNTER SYMPTOMS
SHORTNESS OF BREATH: 0
COLOR CHANGE: 1
CHOKING: 0
SINUS PRESSURE: 1
WHEEZING: 0
EYES NEGATIVE: 1
RHINORRHEA: 1
COUGH: 1
CHEST TIGHTNESS: 0

## 2021-06-18 NOTE — PROGRESS NOTES
21  Jasmina Ruby : 1960 Sex: female  Age: 64 y.o. Chief Complaint   Patient presents with    Rash     All over body. Started 5 days ago. Itchy     Abdominal Pain     Right side. Started 1 week ago     Hoarse    Chest Congestion     coughing. Mucinex little relief        Patient is a 53-year-old white female with a history of psoriasis and has been on Remicade and has a rash today all over her body that started 5 days prior along with some left-sided right-sided abdominal and chest pain which is worse this nasal congestion no fever she has trouble expectorating. No myalgias no loss of taste or smell she has been Covid immunized. She is going to see the [facility] in regard to her rash Friday. Review of Systems   Constitutional: Negative. HENT: Positive for congestion, postnasal drip, rhinorrhea and sinus pressure. Eyes: Negative. Respiratory: Positive for cough. Negative for choking, chest tightness, shortness of breath and wheezing. Cardiovascular: Negative. Skin: Positive for color change and rash.          Current Outpatient Medications:     methylPREDNISolone (MEDROL DOSEPACK) 4 MG tablet, Take by mouth., Disp: 1 kit, Rfl: 0    cetirizine (ZYRTEC) 10 MG tablet, Take 1 tablet by mouth daily, Disp: 30 tablet, Rfl: 1    sulfamethoxazole-trimethoprim (BACTRIM DS) 800-160 MG per tablet, Take 1 tablet by mouth 2 times daily, Disp: 20 tablet, Rfl: 0    metoprolol tartrate (LOPRESSOR) 25 MG tablet, Take 1 tablet by mouth 2 times daily, Disp: 180 tablet, Rfl: 5    losartan (COZAAR) 100 MG tablet, Take 1 tablet by mouth daily, Disp: 90 tablet, Rfl: 5    potassium chloride (KLOR-CON M) 20 MEQ extended release tablet, Take 1 tablet by mouth 2 times daily, Disp: 180 tablet, Rfl: 5    traZODone (DESYREL) 50 MG tablet, 2 q hs, Disp: 180 tablet, Rfl: 5    levothyroxine (SYNTHROID) 125 MCG tablet, Take 1 tablet by mouth daily, Disp: 90 tablet, Rfl: 3   neomycin-polymyxin-dexamethasone (MAXITROL) 0.1 % ophthalmic suspension, Place 1 drop into the right eye 4 times daily, Disp: 5 mL, Rfl: 0    DULoxetine (CYMBALTA) 30 MG extended release capsule, Take 30 mg by mouth daily, Disp: , Rfl:     nystatin (MYCOSTATIN) 847054 UNIT/GM powder, Apply topically 4 times daily. , Disp: 1 Bottle, Rfl: 1    sulconazole (EXELDERM) 1 % cream, Apply topically bid, Disp: 1 Tube, Rfl: 12    oxiconazole nitrate (OXISTAT) 1 % CREA cream, Bid to breast fold, Disp: 1 Tube, Rfl: 12    inFLIXimab (REMICADE) 100 MG injection, Infuse 800 mg intravenously See Admin Instructions Indications: 800 MG every 5 weeks Over 2 hours every 8 weeks , Disp: , Rfl:     Methotrexate, Anti-Rheumatic, (METHOTREXATE, PF, SC), Inject 1 mL into the skin once a week, Disp: , Rfl:     magnesium (MAGNESIUM-OXIDE) 250 MG TABS tablet, Take 250 mg by mouth daily, Disp: , Rfl:     Cholecalciferol (VITAMIN D3) 2000 UNITS CAPS, Take 3,000 Int'l Units by mouth daily. , Disp: , Rfl:     SUMAtriptan (IMITREX) 100 MG tablet, Take 1 tablet by mouth once as needed for Migraine (1 prn headache . may repeat two hours later.  max 2 per day and 6 per week), Disp: 9 tablet, Rfl: 12  Allergies   Allergen Reactions    Amoxicillin-Pot Clavulanate Rash    Biaxin [Clarithromycin] Hives and Rash    Cefaclor Hives and Rash     rash    Clavulanic Acid Rash    Doxycycline Rash    Macrobid [Nitrofurantoin] Nausea And Vomiting       Past Medical History:   Diagnosis Date    Altered bowel elimination due to intestinal ostomy ([facility] [address]     Anemia     Arthritis     Crohn disease ([facility] [address]     Fatty liver     Hernia     History of DVT (deep vein thrombosis) 6/10/2015    History of pulmonary embolus (PE) 6/10/2015    Hyperlipemia     Hypertension     Hypothyroidism     Intervertebral lumbar disc disorder with myelopathy, lumbar region     Leukocytosis     Movement disorder     MRSA infection     UTI    Obesity     Osteoarthritis of both knees     Palpitations     PE (pulmonary embolism)     Rash     fine skin rash not itchy gastro dr aware    Syncope     Thyroid disease     Uterine fibroid     Vitamin D deficiency      Past Surgical History:   Procedure Laterality Date    ABDOMEN SURGERY  11/2/14    I&d abdominal wound    CARPAL TUNNEL RELEASE      CHOLECYSTECTOMY      COLECTOMY      ostomy    COLECTOMY      COLONOSCOPY      ENDOMETRIAL ABLATION      GASTROSTOMY TUBE PLACEMENT      HERNIA REPAIR      ILEOSTOMY OR JEJUNOSTOMY      placed 2/2015 reversed 9/2015    JOINT REPLACEMENT  11/2013    both knees    TONSILLECTOMY      TOTAL KNEE ARTHROPLASTY Bilateral 2013    TUBAL LIGATION      TUNNELED VENOUS CATHETER PLACEMENT       Family History   Problem Relation Age of Onset    Hypertension Mother      Social History     Tobacco Use    Smoking status: Never Smoker    Smokeless tobacco: Never Used   Vaping Use    Vaping Use: Never used   Substance Use Topics    Alcohol use: No    Drug use: No        Vitals:    06/18/21 0911   BP: (!) 142/80   Pulse: 97   Temp: 97.8 °F (36.6 °C)   SpO2: 97%   Weight: 208 lb (94.3 kg)   Height: 5' 3\" (1.6 m)       Physical Exam  Vitals and nursing note reviewed. Constitutional:       Appearance: Normal appearance. HENT:      Head: Normocephalic and atraumatic. Right Ear: Tympanic membrane, ear canal and external ear normal. There is no impacted cerumen. Left Ear: Tympanic membrane, ear canal and external ear normal. There is no impacted cerumen. Nose: Mucosal edema, congestion and rhinorrhea present. Right Turbinates: Enlarged and swollen. Left Turbinates: Enlarged and swollen. Right Sinus: Maxillary sinus tenderness and frontal sinus tenderness present. Left Sinus: Frontal sinus tenderness present. Mouth/Throat:      Mouth: Mucous membranes are moist.      Pharynx: Oropharynx is clear.  No oropharyngeal exudate or posterior oropharyngeal erythema. Cardiovascular:      Rate and Rhythm: Normal rate and regular rhythm. Heart sounds: Murmur heard. Pulmonary:      Breath sounds: Normal breath sounds. Lymphadenopathy:      Cervical: No cervical adenopathy. Skin:     Findings: Erythema, lesion and rash present. Neurological:      Mental Status: She is alert. Assessment and Plan:  Kamilla Garcia was seen today for rash, abdominal pain, hoarse and chest congestion. Diagnoses and all orders for this visit:    Acute non-recurrent frontal sinusitis    Other orders  -     methylPREDNISolone (MEDROL DOSEPACK) 4 MG tablet; Take by mouth. -     cetirizine (ZYRTEC) 10 MG tablet; Take 1 tablet by mouth daily  -     sulfamethoxazole-trimethoprim (BACTRIM DS) 800-160 MG per tablet; Take 1 tablet by mouth 2 times daily        Orders Placed This Encounter   Medications    methylPREDNISolone (MEDROL DOSEPACK) 4 MG tablet     Sig: Take by mouth. Dispense:  1 kit     Refill:  0    cetirizine (ZYRTEC) 10 MG tablet     Sig: Take 1 tablet by mouth daily     Dispense:  30 tablet     Refill:  1    sulfamethoxazole-trimethoprim (BACTRIM DS) 800-160 MG per tablet     Sig: Take 1 tablet by mouth 2 times daily     Dispense:  20 tablet     Refill:  0        Patient advised to follow up with PCP as needed. Seen By:  Alison Holley DO  Due to her multiple antibiotic allergies we are going to place her on Bactrim along with the Zyrtec and Medrol Dosepak follow-up with her PCP.

## 2021-08-27 ENCOUNTER — OFFICE VISIT (OUTPATIENT)
Dept: FAMILY MEDICINE CLINIC | Age: 61
End: 2021-08-27
Payer: COMMERCIAL

## 2021-08-27 VITALS
WEIGHT: 217 LBS | SYSTOLIC BLOOD PRESSURE: 170 MMHG | BODY MASS INDEX: 38.44 KG/M2 | HEART RATE: 81 BPM | RESPIRATION RATE: 16 BRPM | DIASTOLIC BLOOD PRESSURE: 106 MMHG | TEMPERATURE: 96.7 F | OXYGEN SATURATION: 95 %

## 2021-08-27 DIAGNOSIS — N39.0 URINARY TRACT INFECTION WITH HEMATURIA, SITE UNSPECIFIED: Primary | ICD-10-CM

## 2021-08-27 DIAGNOSIS — R31.9 URINARY TRACT INFECTION WITH HEMATURIA, SITE UNSPECIFIED: Primary | ICD-10-CM

## 2021-08-27 DIAGNOSIS — R30.0 DYSURIA: ICD-10-CM

## 2021-08-27 LAB
APPEARANCE FLUID: CLEAR
BILIRUBIN, POC: NEGATIVE
BLOOD URINE, POC: NORMAL
CLARITY, POC: CLEAR
COLOR, POC: NORMAL
GLUCOSE URINE, POC: NORMAL
KETONES, POC: NEGATIVE
LEUKOCYTE EST, POC: NORMAL
NITRITE, POC: POSITIVE
PH, POC: 6.5
PROTEIN, POC: NORMAL
SPECIFIC GRAVITY, POC: 1.02
UROBILINOGEN, POC: 1

## 2021-08-27 PROCEDURE — 81002 URINALYSIS NONAUTO W/O SCOPE: CPT | Performed by: NURSE PRACTITIONER

## 2021-08-27 PROCEDURE — G8427 DOCREV CUR MEDS BY ELIG CLIN: HCPCS | Performed by: NURSE PRACTITIONER

## 2021-08-27 PROCEDURE — 99214 OFFICE O/P EST MOD 30 MIN: CPT | Performed by: NURSE PRACTITIONER

## 2021-08-27 PROCEDURE — G8417 CALC BMI ABV UP PARAM F/U: HCPCS | Performed by: NURSE PRACTITIONER

## 2021-08-27 PROCEDURE — 3017F COLORECTAL CA SCREEN DOC REV: CPT | Performed by: NURSE PRACTITIONER

## 2021-08-27 PROCEDURE — 1036F TOBACCO NON-USER: CPT | Performed by: NURSE PRACTITIONER

## 2021-08-27 RX ORDER — SULFAMETHOXAZOLE AND TRIMETHOPRIM 800; 160 MG/1; MG/1
1 TABLET ORAL 2 TIMES DAILY
Qty: 14 TABLET | Refills: 0 | Status: SHIPPED | OUTPATIENT
Start: 2021-08-27 | End: 2021-09-03

## 2021-08-27 NOTE — PROGRESS NOTES
21  Molinda Snellen : 1960 Sex: female  Age 64 y.o. Subjective:  Chief Complaint   Patient presents with    Urinary Tract Infection     sx x3w/used 7 days of cipro w no result. HPI:   Molinda Snellen , 64 y.o. female presents to the clinic for evaluation of UTI symptoms x 3 weeks. Reports associated frequency, urgency, and suprapubic pressure. The patient has taken Cipro for symptoms. The patient reports unchanged symptoms over time. Denies gross hematuria. Denies associated flank pain. Denies vaginal discharge, vaginal bleeding, or lethargy. The patient also denies headache, fever, chest pain, abdominal pain, shortness of breath, and nausea / vomiting / diarrhea. No LMP recorded. Patient has had an ablation. ROS:   Unless otherwise stated in this report the patient's positive and negative responses for review of systems for constitutional, eyes, ENT, cardiovascular, respiratory, gastrointestinal, neurological, , musculoskeletal, and integument systems and related systems to the presenting problem are either stated in the history of present illness or were not pertinent or were negative for the symptoms and/or complaints related to the presenting medical problem. Positives and pertinent negatives as per HPI. All others reviewed and are negative.       PMH:     Past Medical History:   Diagnosis Date    Altered bowel elimination due to intestinal ostomy ([address]     Anemia     Arthritis     Cro[facility] [address]     Fatty liver     Hernia     History of DVT (deep vein thrombosis) 6/10/2015    History of pulmonary embolus (PE) 6/10/2015    Hyperlipemia     Hypertension     Hypothyroidism     Intervertebral lumbar disc disorder with myelopathy, lumbar region     Leukocytosis     Movement disorder     MRSA infection     UTI    Obesity     Osteoarthritis of both knees     Palpitations     PE (pulmonary embolism)     Rash     fine skin rash not itchy gastro  aware    Syncope     Thyroid disease     Uterine fibroid     Vitamin D deficiency        Past Surgical History:   Procedure Laterality Date    ABDOMEN SURGERY  11/2/14    I&d abdominal wound    CARPAL TUNNEL RELEASE      CHOLECYSTECTOMY      COLECTOMY      ostomy    COLECTOMY      COLONOSCOPY      ENDOMETRIAL ABLATION      GASTROSTOMY TUBE PLACEMENT      HERNIA REPAIR      ILEOSTOMY OR JEJUNOSTOMY      placed 2/2015 reversed 9/2015    JOINT REPLACEMENT  11/2013    both knees    TONSILLECTOMY      TOTAL KNEE ARTHROPLASTY Bilateral 2013    TUBAL LIGATION      TUNNELED VENOUS CATHETER PLACEMENT         Family History   Problem Relation Age of Onset    Hypertension Mother        Medications:     Current Outpatient Medications:     sulfamethoxazole-trimethoprim (BACTRIM DS) 800-160 MG per tablet, Take 1 tablet by mouth 2 times daily for 7 days, Disp: 14 tablet, Rfl: 0    methylPREDNISolone (MEDROL DOSEPACK) 4 MG tablet, Take by mouth., Disp: 1 kit, Rfl: 0    cetirizine (ZYRTEC) 10 MG tablet, Take 1 tablet by mouth daily, Disp: 30 tablet, Rfl: 1    sulfamethoxazole-trimethoprim (BACTRIM DS) 800-160 MG per tablet, Take 1 tablet by mouth 2 times daily, Disp: 20 tablet, Rfl: 0    metoprolol tartrate (LOPRESSOR) 25 MG tablet, Take 1 tablet by mouth 2 times daily, Disp: 180 tablet, Rfl: 5    losartan (COZAAR) 100 MG tablet, Take 1 tablet by mouth daily, Disp: 90 tablet, Rfl: 5    potassium chloride (KLOR-CON M) 20 MEQ extended release tablet, Take 1 tablet by mouth 2 times daily, Disp: 180 tablet, Rfl: 5    traZODone (DESYREL) 50 MG tablet, 2 q hs, Disp: 180 tablet, Rfl: 5    levothyroxine (SYNTHROID) 125 MCG tablet, Take 1 tablet by mouth daily, Disp: 90 tablet, Rfl: 3    neomycin-polymyxin-dexamethasone (MAXITROL) 0.1 % ophthalmic suspension, Place 1 drop into the right eye 4 times daily, Disp: 5 mL, Rfl: 0    DULoxetine (CYMBALTA) 30 MG extended release capsule, Take 30 mg by mouth daily, Disp: , Rfl:   nystatin (MYCOSTATIN) 776051 UNIT/GM powder, Apply topically 4 times daily. , Disp: 1 Bottle, Rfl: 1    sulconazole (EXELDERM) 1 % cream, Apply topically bid, Disp: 1 Tube, Rfl: 12    oxiconazole nitrate (OXISTAT) 1 % CREA cream, Bid to breast fold, Disp: 1 Tube, Rfl: 12    SUMAtriptan (IMITREX) 100 MG tablet, Take 1 tablet by mouth once as needed for Migraine (1 prn headache . may repeat two hours later. max 2 per day and 6 per week), Disp: 9 tablet, Rfl: 12    inFLIXimab (REMICADE) 100 MG injection, Infuse 800 mg intravenously See Admin Instructions Indications: 800 MG every 5 weeks Over 2 hours every 8 weeks , Disp: , Rfl:     Methotrexate, Anti-Rheumatic, (METHOTREXATE, PF, SC), Inject 1 mL into the skin once a week, Disp: , Rfl:     magnesium (MAGNESIUM-OXIDE) 250 MG TABS tablet, Take 250 mg by mouth daily, Disp: , Rfl:     Cholecalciferol (VITAMIN D3) 2000 UNITS CAPS, Take 3,000 Int'l Units by mouth daily. , Disp: , Rfl:     Allergies: Allergies   Allergen Reactions    Amoxicillin-Pot Clavulanate Rash    Biaxin [Clarithromycin] Hives and Rash    Cefaclor Hives and Rash     rash    Clavulanic Acid Rash    Doxycycline Rash    Macrobid [Nitrofurantoin] Nausea And Vomiting       Social History:     Social History     Tobacco Use    Smoking status: Never Smoker    Smokeless tobacco: Never Used   Vaping Use    Vaping Use: Never used   Substance Use Topics    Alcohol use: No    Drug use: No       Patient lives at home. Physical Exam:     Vitals:    08/27/21 1340 08/27/21 1342 08/27/21 1359   BP: (!) 188/102 (!) 180/98 (!) 170/106   Site: Right Upper Arm Left Upper Arm    Pulse: 81     Resp: 16     Temp: 96.7 °F (35.9 °C)     SpO2: 95%     Weight: 217 lb (98.4 kg)         Physical Exam (PE)   Constitutional: Alert, development consistent with age. HENT:      Head: Normocephalic.       Right Ear: External ear normal.      Left Ear: External ear normal.      Nose: Normal.      Mouth/Throat: Mouth: Mucous membranes are moist.      Pharynx: Oropharynx is clear. Eyes: Pupils: Pupils are equal, round, and reactive to light. Neck: Normal ROM. Supple. Cardiovascular: Heart RRR without pathologic murmurs or gallops. Pulmonary: Respiratory effort normal.  Normal breath sounds. Abdomen: Soft, nondistended, with mild suprapubic tenderness. No rebound, rigidity, or guarding. BS+ X4. No organomegaly. Back: Negative CVA tenderness. Skin:  Normal turgor. Warm, dry, without visible rash, unless noted elsewhere. Neurological:  Alert and oriented. Motor functions intact. Responds to verbal commands. Psychiatric: Mood and Affect: Mood normal. Behavior: Behavior normal    Testing:   (All laboratory and radiology results have been personally reviewed by myself)  Labs:  Results for orders placed or performed in visit on 08/27/21   POCT Urinalysis no Micro   Result Value Ref Range    Color, UA orange     Clarity, UA clear     Glucose, UA POC 100mg     Bilirubin, UA negative     Ketones, UA negative     Spec Grav, UA 1.020     Blood, UA POC trace     pH, UA 6.5     Protein, UA POC 30mg     Urobilinogen, UA 1.0     Leukocytes, UA trace     Nitrite, UA positive     Appearance, Fluid Clear Clear, Slightly Cloudy       Imaging: All Radiology results interpreted by Radiologist unless otherwise noted. No orders to display       Assessment / Plan:   The patient's vitals, allergies, medications, and past medical history have been reviewed. Umberto Lazo was seen today for urinary tract infection. Diagnoses and all orders for this visit:    Urinary tract infection with hematuria, site unspecified  -     Culture, Urine; Future  -     sulfamethoxazole-trimethoprim (BACTRIM DS) 800-160 MG per tablet; Take 1 tablet by mouth 2 times daily for 7 days    Dysuria  -     POCT Urinalysis no Micro        - Disposition: Home    - Educational material printed for patient's review and were included in patient instructions.  After Visit Summary and given to patient at the end of visit. - Urine C&S pending, will call with results once available. Discussed symptomatic treatments with patient today. Increase fluids and rest. F/u PCP in 3-5 days if symptoms persist. ED sooner if symptoms worsen or change. ED immediately with the development of fever, shaking chills, body aches, flank pain, vomiting, CP, or SOB. Pt is in agreement with this care plan. All questions answered. SIGNATURE: FRANCISCO Isidro    *NOTE: This report was transcribed using voice recognition software. Every effort was made to ensure accuracy; however, inadvertent computerized transcription errors may be present.

## 2021-08-27 NOTE — PATIENT INSTRUCTIONS
sprays, and other feminine hygiene products that have deodorants. · After going to the bathroom, wipe from front to back. When should you call for help? Call your doctor now or seek immediate medical care if:    · Symptoms such as fever, chills, nausea, or vomiting get worse or appear for the first time.     · You have new pain in your back just below your rib cage. This is called flank pain.     · There is new blood or pus in your urine.     · You have any problems with your antibiotic medicine. Watch closely for changes in your health, and be sure to contact your doctor if:    · You are not getting better after taking an antibiotic for 2 days.     · Your symptoms go away but then come back. Where can you learn more? Go to https://MyMedMatch[employer]. org and sign in to your [employer] account. Enter R405 in the JobHive box to learn more about \"Urinary Tract Infection (UTI) in Women: Care Instructions. \"     If you do not have an account, please click on the \"Sign Up Now\" link. Current as of: February 10, 2021               Content Version: 12.9  © 4664-5741 Healthwise, Incorporated. Care instructions adapted under license by [facility] ([facility]). If you have questions about a medical condition or this instruction, always ask your healthcare professional. [address] any warranty or liability for your use of this information.

## 2021-11-29 ENCOUNTER — OFFICE VISIT (OUTPATIENT)
Dept: PRIMARY CARE CLINIC | Age: 61
End: 2021-11-29
Payer: COMMERCIAL

## 2021-11-29 VITALS
WEIGHT: 204 LBS | SYSTOLIC BLOOD PRESSURE: 142 MMHG | TEMPERATURE: 98.2 F | BODY MASS INDEX: 36.14 KG/M2 | DIASTOLIC BLOOD PRESSURE: 84 MMHG

## 2021-11-29 DIAGNOSIS — G44.89 OTHER HEADACHE SYNDROME: ICD-10-CM

## 2021-11-29 DIAGNOSIS — M79.10 MYALGIA: ICD-10-CM

## 2021-11-29 DIAGNOSIS — F41.9 ANXIETY: ICD-10-CM

## 2021-11-29 DIAGNOSIS — Z00.01 ENCOUNTER FOR ANNUAL GENERAL MEDICAL EXAMINATION WITH ABNORMAL FINDINGS IN ADULT: Primary | ICD-10-CM

## 2021-11-29 DIAGNOSIS — M81.0 AGE-RELATED OSTEOPOROSIS WITHOUT CURRENT PATHOLOGICAL FRACTURE: ICD-10-CM

## 2021-11-29 DIAGNOSIS — E03.9 ACQUIRED HYPOTHYROIDISM: Chronic | ICD-10-CM

## 2021-11-29 DIAGNOSIS — I10 ESSENTIAL HYPERTENSION: Chronic | ICD-10-CM

## 2021-11-29 DIAGNOSIS — D51.8 VITAMIN B12 DEFICIENCY (DIETARY) ANEMIA: ICD-10-CM

## 2021-11-29 DIAGNOSIS — E11.9 DIET-CONTROLLED DIABETES MELLITUS (HCC): ICD-10-CM

## 2021-11-29 DIAGNOSIS — D50.8 OTHER IRON DEFICIENCY ANEMIA: ICD-10-CM

## 2021-11-29 DIAGNOSIS — K50.10 CROHN'S DISEASE OF COLON WITHOUT COMPLICATION (HCC): Chronic | ICD-10-CM

## 2021-11-29 PROCEDURE — 99396 PREV VISIT EST AGE 40-64: CPT | Performed by: FAMILY MEDICINE

## 2021-11-29 PROCEDURE — G8484 FLU IMMUNIZE NO ADMIN: HCPCS | Performed by: FAMILY MEDICINE

## 2021-11-29 RX ORDER — LEVOTHYROXINE SODIUM 0.12 MG/1
125 TABLET ORAL DAILY
Qty: 90 TABLET | Refills: 3 | Status: SHIPPED
Start: 2021-11-29 | End: 2022-10-21 | Stop reason: SDUPTHER

## 2021-11-29 ASSESSMENT — ENCOUNTER SYMPTOMS
ALLERGIC/IMMUNOLOGIC NEGATIVE: 1
GASTROINTESTINAL NEGATIVE: 1
EYES NEGATIVE: 1
RESPIRATORY NEGATIVE: 1

## 2021-11-29 ASSESSMENT — PATIENT HEALTH QUESTIONNAIRE - PHQ9
SUM OF ALL RESPONSES TO PHQ QUESTIONS 1-9: 0
SUM OF ALL RESPONSES TO PHQ QUESTIONS 1-9: 0
SUM OF ALL RESPONSES TO PHQ9 QUESTIONS 1 & 2: 0
2. FEELING DOWN, DEPRESSED OR HOPELESS: 0
1. LITTLE INTEREST OR PLEASURE IN DOING THINGS: 0
SUM OF ALL RESPONSES TO PHQ QUESTIONS 1-9: 0

## 2021-11-29 NOTE — PROGRESS NOTES
21  Name: Nithya Velez    : 1960    Sex: female    Age: 63 y.o. Subjective:  Chief Complaint: Patient is here for  htn crohns   Wt  psorais  thryoid   headache    Feel bandar  psoriais   Bd  Wt down on diet  faiel dfu      Was due august  mtx help s [place] but bother her  bp up at doc office  shenot ck at home  Headaches for several  Months   toa year  But wrose lately----when  Comes she feels foggy      She wa son Zully Connelly in past andnothelp and she quti using  She faield fu with rheum [facility]   s eeing  Derm at [facility]      Review of Systems   Constitutional: Negative. HENT: Negative. Eyes: Negative. Respiratory: Negative. Cardiovascular: Negative. Gastrointestinal: Negative. Endocrine: Negative. Genitourinary: Negative. Musculoskeletal: Positive for arthralgias. Skin: Negative. Allergic/Immunologic: Negative. Neurological: Negative. Hematological: Negative. Psychiatric/Behavioral: Negative.           Current Outpatient Medications:     levothyroxine (SYNTHROID) 125 MCG tablet, Take 1 tablet by mouth daily, Disp: 90 tablet, Rfl: 3    metoprolol tartrate (LOPRESSOR) 25 MG tablet, Take 1 tablet by mouth 2 times daily, Disp: 180 tablet, Rfl: 5    losartan (COZAAR) 100 MG tablet, Take 1 tablet by mouth daily, Disp: 90 tablet, Rfl: 5    potassium chloride (KLOR-CON M) 20 MEQ extended release tablet, Take 1 tablet by mouth 2 times daily, Disp: 180 tablet, Rfl: 5    traZODone (DESYREL) 50 MG tablet, 2 q hs, Disp: 180 tablet, Rfl: 5    DULoxetine (CYMBALTA) 30 MG extended release capsule, Take 30 mg by mouth daily, Disp: , Rfl:     sulconazole (EXELDERM) 1 % cream, Apply topically bid, Disp: 1 Tube, Rfl: 12    oxiconazole nitrate (OXISTAT) 1 % CREA cream, Bid to breast fold, Disp: 1 Tube, Rfl: 12    inFLIXimab (REMICADE) 100 MG injection, Infuse 800 mg intravenously See Admin Instructions Indications: 800 MG every 5 weeks Over 2 hours every 8 weeks , Disp: , Rfl: BELLE 11-13    ADMIT 3-14 WITH FLARE OF CROHNS DIS    ADMIT WITH BRONCHITIS 4-14    FLARE CROHNS DIS    COLONOSCOPY 10-14 [facility]----ACTIVE CROHNS COLITIS IN SIGMOID    ADMIT 11-14 WITH VENTRAL WALL ABSCESS AND POSS FISTULA    SYNCOPE 11-14 WITH LACERATION SCALP    OR [facility] 2-11-15---- FOR PARTIAL BOWEL RESECTION--- PEG TUBE IN----DUE TO INFECTED    MESH    admit 5-15 with UTI SEPSIS    ILEOSTOMY REVERSAL 9-15 [facility]    MRSA UTI 10-15    START REMICADE 12-15    eval dr Yana Tello  3-19 with shots back   Mri with 5 herniated disc    Admit  11-20  With  Ventral hernia fistula  At  [facility]    Son surekha oliveros from [facility]  [place]   for phd program    biochem    Son engaged     Social Determinants of Health     Financial Resource Strain:     Difficulty of Paying Living Expenses: Not on file   Food Insecurity:     Worried About Running Out of Food in the Last Year: Not on file    Richi of Food in the Last Year: Not on file   Transportation Needs:     Lack of Transportation (Medical): Not on file    Lack of Transportation (Non-Medical):  Not on file   Physical Activity:     Days of Exercise per Week: Not on file    Minutes of Exercise per Session: Not on file   Stress:     Feeling of Stress : Not on file   Social Connections:     Frequency of Communication with Friends and Family: Not on file    Frequency of Social Gatherings with Friends and Family: Not on file    Attends [employer] Services: Not on file    Active Member of [address] [place] [address] or Organizations: Not on file    Attends Club or Organization Meetings: Not on file    Marital Status: Not on file   Intimate Partner Violence:     Fear of Current or Ex-Partner: Not on file    Emotionally Abused: Not on file    Physically Abused: Not on file    Sexually Abused: Not on file   Housing Stability:     Unable to Pay for Housing in the Last Year: Not on file    Number of [place] in the Last Year: Not on file    Unstable Housing in the Last Year: Not on file Past Medical History:   Diagnosis Date    Altered bowel elimination due to intestinal ostomy [facility] [address]     Anemia     Arthritis     Crohn disease ([facility]     Fatty liver     Hernia     History of DVT (deep vein thrombosis) 6/10/2015    History of pulmonary embolus (PE) 6/10/2015    Hyperlipemia     Hypertension     Hypothyroidism     Intervertebral lumbar disc disorder with myelopathy, lumbar region     Leukocytosis     Movement disorder     MRSA infection     UTI    Obesity     Osteoarthritis of both knees     Palpitations     PE (pulmonary embolism)     Rash     fine skin rash not itchy gastro dr aware    Syncope     Thyroid disease     Uterine fibroid     Vitamin D deficiency      Family History   Problem Relation Age of Onset    Hypertension Mother       Past Surgical History:   Procedure Laterality Date    ABDOMEN SURGERY  11/2/14    I&d abdominal wound    CARPAL TUNNEL RELEASE      CHOLECYSTECTOMY      COLECTOMY      ostomy    COLECTOMY      COLONOSCOPY      ENDOMETRIAL ABLATION      GASTROSTOMY TUBE PLACEMENT      HERNIA REPAIR      ILEOSTOMY OR JEJUNOSTOMY      placed 2/2015 reversed 9/2015    JOINT REPLACEMENT  11/2013    both knees    TONSILLECTOMY      TOTAL KNEE ARTHROPLASTY Bilateral 2013    TUBAL LIGATION      TUNNELED VENOUS CATHETER PLACEMENT        Vitals:    11/29/21 0739   BP: (!) 142/84   Temp: 98.2 °F (36.8 °C)   TempSrc: Oral   Weight: 204 lb (92.5 kg)       Objective:    Physical Exam  Vitals reviewed. Constitutional:       Appearance: She is well-developed. HENT:      Head: Normocephalic. Eyes:      Pupils: Pupils are equal, round, and reactive to light. Cardiovascular:      Rate and Rhythm: Normal rate and regular rhythm. Pulmonary:      Effort: Pulmonary effort is normal.      Breath sounds: Normal breath sounds. Abdominal:      Palpations: Abdomen is soft. Musculoskeletal:         General: Normal range of motion.       Cervical back: Normal range of motion. Skin:     General: Skin is warm. Comments: psoraisis patches   Neurological:      Mental Status: She is alert and oriented to person, place, and time. Psychiatric:         Behavior: Behavior normal.         Glenn Nickerson was seen today for medication refill. Diagnoses and all orders for this visit:    Encounter for annual general medical examination with abnormal findings in adult    Essential hypertension    Acquired hypothyroidism  -     levothyroxine (SYNTHROID) 125 MCG tablet; Take 1 tablet by mouth daily    Crohn's disease of colon without complication ([facility] [address]    Anxiety    Other headache syndrome  -     Henri Mccarthy MD, Neurology, [facility]; Future        Comments: lab soon   Diet exer mri  Brain   appt neurp   Diet exer lsoe wt  Ck bp home bid      And call with nbumers    Check blood pressure at home twice a day. Low-salt low caffeine diet. Call if systolic blood pressure is above 498 and diastolic blood pressures above 85. Only use a upper arm digital cuff. Aggressive low-fat diet. Avoid red meats, greasy fried foods, dairy products. Avoid processed foods. Take cholesterol medications without food. I educated the patient about all medications. Make sure they were correct and educated  on the risk associated with missing meds or taking them incorrectly. A list of medications is being sent home with patient today. I informed patient about the risk associated with noncompliance of medication and taking medications incorrectly. Appropriate follow-up with myself and all specialist.  Encourage family members to take active role in assisting with medications and medical care. If any confusion should develop to notify my office immediately to avoid risk of worsening medical condition    A great deal of time spent reviewing medications, diet, exercise, social issues.  Also reviewing the chart before entering the room with patient and finishing charting after leaving patient's room. More than half of that time was spent face to face with the patient in counseling and coordinating care.       Follow Up: Return in about 4 months (around 3/29/2022) for See Referral.     Seen by:  Baldomero Canchola DO

## 2021-12-02 ENCOUNTER — TELEPHONE (OUTPATIENT)
Dept: PRIMARY CARE CLINIC | Age: 61
End: 2021-12-02

## 2021-12-02 DIAGNOSIS — G44.89 OTHER HEADACHE SYNDROME: ICD-10-CM

## 2022-01-26 ENCOUNTER — OFFICE VISIT (OUTPATIENT)
Dept: NEUROLOGY | Age: 62
End: 2022-01-26
Payer: COMMERCIAL

## 2022-01-26 VITALS
SYSTOLIC BLOOD PRESSURE: 200 MMHG | BODY MASS INDEX: 36.14 KG/M2 | DIASTOLIC BLOOD PRESSURE: 90 MMHG | HEIGHT: 63 IN | WEIGHT: 204 LBS

## 2022-01-26 DIAGNOSIS — G43.809 MIGRAINE VARIANT WITH HEADACHE: Chronic | ICD-10-CM

## 2022-01-26 DIAGNOSIS — G44.209 TENSION VASCULAR HEADACHE: Primary | ICD-10-CM

## 2022-01-26 PROCEDURE — G8417 CALC BMI ABV UP PARAM F/U: HCPCS | Performed by: PSYCHIATRY & NEUROLOGY

## 2022-01-26 PROCEDURE — G8482 FLU IMMUNIZE ORDER/ADMIN: HCPCS | Performed by: PSYCHIATRY & NEUROLOGY

## 2022-01-26 PROCEDURE — 1036F TOBACCO NON-USER: CPT | Performed by: PSYCHIATRY & NEUROLOGY

## 2022-01-26 PROCEDURE — 99204 OFFICE O/P NEW MOD 45 MIN: CPT | Performed by: PSYCHIATRY & NEUROLOGY

## 2022-01-26 PROCEDURE — G8427 DOCREV CUR MEDS BY ELIG CLIN: HCPCS | Performed by: PSYCHIATRY & NEUROLOGY

## 2022-01-26 PROCEDURE — 3017F COLORECTAL CA SCREEN DOC REV: CPT | Performed by: PSYCHIATRY & NEUROLOGY

## 2022-01-26 RX ORDER — TOPIRAMATE 25 MG/1
25 TABLET ORAL 2 TIMES DAILY
Qty: 124 TABLET | Refills: 5 | Status: SHIPPED
Start: 2022-01-26 | End: 2022-08-18

## 2022-01-26 RX ORDER — METHOTREXATE 25 MG/ML
INJECTION, SOLUTION INTRA-ARTERIAL; INTRAMUSCULAR; INTRAVENOUS
COMMUNITY
Start: 2022-01-18

## 2022-01-26 RX ORDER — DIPHENHYDRAMINE HYDROCHLORIDE 25 MG/1
5 TABLET ORAL DAILY
COMMUNITY

## 2022-01-26 RX ORDER — USTEKINUMAB 90 MG/ML
INJECTION, SOLUTION SUBCUTANEOUS
COMMUNITY
Start: 2021-12-12

## 2022-01-26 RX ORDER — OMEPRAZOLE 20 MG/1
20 CAPSULE, DELAYED RELEASE ORAL DAILY
COMMUNITY

## 2022-01-26 RX ORDER — LANOLIN ALCOHOL/MO/W.PET/CERES
CREAM (GRAM) TOPICAL
COMMUNITY
Start: 2021-05-11

## 2022-01-26 ASSESSMENT — ENCOUNTER SYMPTOMS
RESPIRATORY NEGATIVE: 1
EYES NEGATIVE: 1
ALLERGIC/IMMUNOLOGIC NEGATIVE: 1
GASTROINTESTINAL NEGATIVE: 1

## 2022-01-26 NOTE — PROGRESS NOTES
Neurology Consult Note:    Patient: Mirella Dowling  : 1960  Date: 22  Referring provider: Scott Higgins DO    Referral to Neurology: chronic episodic headaches; hx migraines. (Note: Patient checked in/roomed late for 40-minute appointment by 10-15 minutes)    Dear Scott Higgins DO:    Thank you for your referral of Mirella Dowling an alert 72-year-old woman referred for chronic episodic headaches and hypertension. Below is a summary of her headache history obtained today:    Headache history: Onset: onset childhood, menstrual cycles. (Patient notes headaches have worsened with worsening blood pressures.)  Location: \"all over\", sides of head, no hemicranial preference; starts back of head. Pain type:pounding headache, head pressure, lightheadedness  Frequency: daily  Duration: waxes and wanes during day  Triggers: bright light & fluorescent light, odors, lack of caffeine  Caffeine use: 1 mug/day, strong coffee  Family history: mother, daughter, sons w/migraine  Medications used/tried: Trazodone-for sleep, metoprolol, duloxetine, mag. oxide 250 mg,   prn Imitrex-doesn't help. Medical/psychiatric comorbidities: Hypertension-poory-controlled, mild glucose impairment, thyroid disorder, history of UTI, hx syncope, dizziness, orthostasis, hx \"arsa\", congenital heart defect, obesity, insomnia    Lab Data: Reviewed from 2021, also , calcium 10.4, 10.5, normal lipid panel, normal LFTs, blood glucose 92, 102, hemoglobin A1c 5.6, 5.7, abnormal TFTs, vitamin D level 32, 23, CBC notable for hematocrit of 39.3, MCH 23.6, MCHC 29.3, normal platelet count, normal WBC, RBC 4.87, absolute monocyte count 1.09, iron level 49, urinalysis notable for UTI, 2020, Klebsiella. Imaging Data: [facility], MR brain scan without contrast, 2021, reviewed: Mild chronic microangiopathy, subcortical, periventricular regions, nonspecific.     Note: (Most commonly seen in patients with history of hypertension, and migraines, hyperlipidemia, glucose impairment, tobacco exposure or use, age-related process, etc.)    Current Outpatient Medications   Medication Sig Dispense Refill    ascorbic acid (VITAMIN C) 100 MG tablet       Biotin 5 MG CAPS Take 5 mg by mouth daily      L-Lysine 1000 MG TABS Take 1,000 mg by mouth daily      methotrexate Sodium (RHEUMATREX) 50 MG/2ML chemo injection INJECT 1 ML (25 MG) SUBCUTANEOUSLY ONCE A WEEK      omeprazole (PRILOSEC) 20 MG delayed release capsule Take 20 mg by mouth daily      thiamine 100 MG tablet       STELARA 90 MG/ML SOSY prefilled syringe       levothyroxine (SYNTHROID) 125 MCG tablet Take 1 tablet by mouth daily 90 tablet 3    metoprolol tartrate (LOPRESSOR) 25 MG tablet Take 1 tablet by mouth 2 times daily 180 tablet 5    losartan (COZAAR) 100 MG tablet Take 1 tablet by mouth daily 90 tablet 5    potassium chloride (KLOR-CON M) 20 MEQ extended release tablet Take 1 tablet by mouth 2 times daily 180 tablet 5    traZODone (DESYREL) 50 MG tablet 2 q hs 180 tablet 5    DULoxetine (CYMBALTA) 30 MG extended release capsule Take 30 mg by mouth daily      sulconazole (EXELDERM) 1 % cream Apply topically bid 1 Tube 12    oxiconazole nitrate (OXISTAT) 1 % CREA cream Bid to breast fold 1 Tube 12    inFLIXimab (REMICADE) 100 MG injection Infuse 800 mg intravenously See Admin Instructions Indications: 800 MG every 5 weeks Over 2 hours every 8 weeks       Methotrexate, Anti-Rheumatic, (METHOTREXATE, PF, SC) Inject 1 mL into the skin once a week      magnesium (MAGNESIUM-OXIDE) 250 MG TABS tablet Take 250 mg by mouth daily      Cholecalciferol (VITAMIN D3) 2000 UNITS CAPS Take 3,000 Int'l Units by mouth daily. No current facility-administered medications for this visit.        Allergies   Allergen Reactions    Amoxicillin-Pot Clavulanate Rash    Biaxin [Clarithromycin] Hives and Rash    Cefaclor Hives and Rash     rash    Clavulanic Acid Rash    Doxycycline Rash    Macrobid [Nitrofurantoin] Nausea And Vomiting       Patient Active Problem List   Diagnosis    Cro[facility]'s colitis ([facility] [address]    Essential hypertension    Arthritis    Thyroid disease    Class 3 severe obesity due to excess calories with serious comorbidity in adult [facility])    Primary osteoarthritis involving multiple joints    Enterocutaneous fistula    History of DVT (deep vein thrombosis)    History of pulmonary embolus (PE)    Acquired hypothyroidism    Ventral hernia without obstruction or gangrene    Dermatitis    Anxiety    Tension vascular headache    Migraine variant with headache       Past Medical History:   Diagnosis Date    Altered bowel elimination due to intestinal ostomy ([facility] [address]     Anemia     Arthritis     Crohn disease ([facility] [address]     Fatty liver     Hernia     History of DVT (deep vein thrombosis) 6/10/2015    History of pulmonary embolus (PE) 6/10/2015    Hyperlipemia     Hypertension     Hypothyroidism     Intervertebral lumbar disc disorder with myelopathy, lumbar region     Leukocytosis     Migraine variant with headache 1/26/2022    Movement disorder     MRSA infection     UTI    Obesity     Osteoarthritis of both knees     Palpitations     PE (pulmonary embolism)     Rash     fine skin rash not itchy gastro dr aware    Syncope     Thyroid disease     Uterine fibroid     Vitamin D deficiency        Past Surgical History:   Procedure Laterality Date    ABDOMEN SURGERY  11/2/14    I&d abdominal wound    CARPAL TUNNEL RELEASE      CHOLECYSTECTOMY      COLECTOMY      ostomy    COLECTOMY      COLONOSCOPY      ENDOMETRIAL ABLATION      GASTROSTOMY TUBE PLACEMENT      HERNIA REPAIR      ILEOSTOMY OR JEJUNOSTOMY      placed 2/2015 reversed 9/2015    JOINT REPLACEMENT  11/2013    both knees    TONSILLECTOMY      TOTAL KNEE ARTHROPLASTY Bilateral 2013    TUBAL LIGATION      TUNNELED VENOUS CATHETER PLACEMENT         Family History Problem Relation Age of Onset    Hypertension Mother        Social History     Socioeconomic History    Marital status:      Spouse name: Not on file    Number of children: Not on file    Years of education: Not on file    Highest education level: Not on file   Occupational History     Employer: [employer]   Tobacco Use    Smoking status: Never Smoker    Smokeless tobacco: Never Used   Vaping Use    Vaping Use: Never used   Substance and Sexual Activity    Alcohol use: No    Drug use: No    Sexual activity: Not on file   Other Topics Concern    Not on file   Social History Narrative        Tetanus Immunization - (8/14/2017)    HYPOTHYROID    HTN    ELEV LFT    FATTY LIVER    S/P UMBILICAL HERNIA OR AND SUBSEQUEST INCISIONAL HERNIA OR 8-06    LEUKOCYTOSIS JEMMA    CTS NO OR    OA KNEES SYNVISC----DR ODONNELL    ALLERGY TO DISSOLVABLE SUTURES    UTERINE FIBROID    ----OSP---[facility]---[facility]    --3    [employer]    OBESITY    UTERINE ABLATION    LOW VIT D 7-10    ECHO 7 -10 STAGE ONE SYED DYSFX    PALP---DR TERRY    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     5-12----DIV 5-13    COLON 1-13 WITH YURICH---TOLD ULCERATIVE COLITIS--NO HELP WITH ASACOL AND BX NEG    DC WTIH INFL BOWEL DIS (CROHNS) AND JOINT SWELLING---REGULE IV STEROIDS---DC ON    PRED AND ANEMIA    STARTED HUMIRA 5-13    BILATERAL TOTAL KNEE DR ODONNELL 11-13    ADMIT 3-14 WITH FLARE OF CROHNS DIS    ADMIT WITH BRONCHITIS 4-14    FLARE CROHNS DIS    COLONOSCOPY 10-14 [facility]----ACTIVE CROHNS COLITIS IN SIGMOID    ADMIT 11-14 WITH VENTRAL WALL ABSCESS AND POSS FISTULA    SYNCOPE 11-14 WITH LACERATION SCALP    OR [facility] 2-11-15---- FOR PARTIAL BOWEL RESECTION--- PEG TUBE IN----DUE TO INFECTED    MESH    admit 5-15 with UTI SEPSIS    ILEOSTOMY REVERSAL 9-15 [facility]    MRSA UTI 10-15    START REMICADE 12-15    eval dr Flavia Tesfaye  3-19 with shots back Mri with 5 herniated disc    Admit  11-20  With  Ventral hernia fistula  At  [facility]    Son surekha   domo from [facility] a and m  for phd program    biochem    Son engaged     Social Determinants of Health     Financial Resource Strain:     Difficulty of Paying Living Expenses: Not on file   Food Insecurity:     Worried About Running Out of Food in the Last Year: Not on file    Richi of Food in the Last Year: Not on file   Transportation Needs:     Lack of Transportation (Medical): Not on file    Lack of Transportation (Non-Medical): Not on file   Physical Activity:     Days of Exercise per Week: Not on file    Minutes of Exercise per Session: Not on file   Stress:     Feeling of Stress : Not on file   Social Connections:     Frequency of Communication with Friends and Family: Not on file    Frequency of Social Gatherings with Friends and Family: Not on file    Attends Anglican Services: Not on file    Active Member of [address] [place] [address] or Organizations: Not on file    Attends Club or Organization Meetings: Not on file    Marital Status: Not on file   Intimate Partner Violence:     Fear of Current or Ex-Partner: Not on file    Emotionally Abused: Not on file    Physically Abused: Not on file    Sexually Abused: Not on file   Housing Stability:     Unable to Pay for Housing in the Last Year: Not on file    Number of [place] in the Last Year: Not on file    Unstable Housing in the Last Year: Not on file     Review of Systems   HENT: Negative. Eyes: Negative. Respiratory: Negative. Cardiovascular: Negative. Gastrointestinal: Negative. Endocrine:        History of thyroid disorder, mild glucose impairment   Genitourinary: Negative. Musculoskeletal: Positive for arthralgias. Bilateral knee replacement   Skin: Negative. Allergic/Immunologic: Negative. Neurological: Positive for dizziness, light-headedness and headaches. Hematological: Negative.     Psychiatric/Behavioral: Positive for sleep disturbance. The patient is nervous/anxious. All other systems reviewed and are negative. Neurologic Exam:  BP (!) 200/90 (Site: Right Upper Arm, Position: Sitting, Cuff Size: Medium Adult)   Ht 5' 3\" (1.6 m)   Wt 204 lb (92.5 kg)   BMI 36.14 kg/m²   General appearance: Alert, obese, anxious, well-nourished, groomed, seated in the exam room, no acute distress  HEENT: Normocephalic/atraumatic. Neck: Supple, no carotid bruits or adventitious sounds heard  Cardiac: RRR  Respiratory: grossly clear  Extremities: No edema, erythema or cyanosis  Skin: No apparent lesions or rash  Musculoskeletal: No fasciculations or tremors  Mental Status: Alert, oriented x3  Speech/Language: Clear, fluent  Attention span/Concentration: Grossly intact  Affect/Mood: Anxious  Insight/Judgement: Fairly good     Fund of Knowledge/Current events: Grossly intact  CN II-XII:     Pupils: Equal, reactive to light, 2.5 mm     EOM's: Full without nystagmus  Visual Fields: Full to confrontation  Fundi: Miosis to light, unable to well-visualize  CN V: normal V1-V3  CN VII: No facial droop  CN VIII: Hearing grossly intact  CN IX-XII: Tongue midline  SCM/Trapezii: 5/5 power  Motor: 5/5 power in the upper and lower extremities without tremor or drift and normal motor tone without cogwheeling or spasticity, intact for motor function of both hands, symmetric. DTR's: 1-2+ and symmetric in the upper extremities, trace patellar, trace to 1+ ankle jerks, no ankle clonus, plantar responses are flexor. Sensory: No subjective sensory motor deficits to light touch and sharp stick testing. Coordination/Gait: No gross limb dysmetria on finger-to-nose testing, no truncal or cerebellar gait ataxia. Assessment/Plan:  1. Chronic episodic probable tension vascular headaches related to poorly-controlled hypertension with elevated blood pressures. History of migraines without hemicranial preference.   Patient reports the headaches that she is experiencing now are different than her usual migraine. 2.  For migraine headache prophylaxis, a trial of topiramate was discussed starting 25 mg twice daily which may be advanced in several weeks to 50 mg twice daily. 3.  She will be following up with your office regarding blood pressure control and evaluation of her antihypertensive medication. Elevated blood pressures and blood pressure fluctuations are probably contributing to her symptoms of lightheadedness and dizziness and orthostasis as well as headaches. 4.  Follow-up in the Neurology clinic in 5 weeks otherwise. 5.  Patient information was provided from the [employer] [facility] website; on cerebral arterial sclerosis risk factors      Sincerely,      Kali Ansari MD    This note was created using speech recognition transcription software. Despite proofreading, there may be several typographical errors present that may affect the meaning of the content. Please call with any questions. Note: A total time of 40 mins. was spent on the date of service in preparation for this visit, which included face-to-face patient care, completing clinical documentation, counseling and coordination of care based on clinical impression, neurologic diagnosis, review of pertinent imaging studies, test results, implementation and discussion of treatment plan, risk factor reduction and patient and/or family education.

## 2022-03-02 ENCOUNTER — APPOINTMENT (OUTPATIENT)
Dept: GENERAL RADIOLOGY | Age: 62
End: 2022-03-02
Payer: COMMERCIAL

## 2022-03-02 ENCOUNTER — TELEPHONE (OUTPATIENT)
Dept: PRIMARY CARE CLINIC | Age: 62
End: 2022-03-02

## 2022-03-02 ENCOUNTER — HOSPITAL ENCOUNTER (EMERGENCY)
Age: 62
Discharge: HOME | End: 2022-03-02
Attending: EMERGENCY MEDICINE
Payer: COMMERCIAL

## 2022-03-02 ENCOUNTER — OFFICE VISIT (OUTPATIENT)
Dept: NEUROLOGY | Age: 62
End: 2022-03-02
Payer: COMMERCIAL

## 2022-03-02 VITALS
TEMPERATURE: 97.4 F | DIASTOLIC BLOOD PRESSURE: 76 MMHG | OXYGEN SATURATION: 99 % | SYSTOLIC BLOOD PRESSURE: 158 MMHG | HEART RATE: 76 BPM | RESPIRATION RATE: 16 BRPM

## 2022-03-02 VITALS
WEIGHT: 200 LBS | HEIGHT: 62 IN | SYSTOLIC BLOOD PRESSURE: 180 MMHG | BODY MASS INDEX: 36.8 KG/M2 | DIASTOLIC BLOOD PRESSURE: 120 MMHG

## 2022-03-02 DIAGNOSIS — I10 HYPERTENSION, UNSPECIFIED TYPE: Primary | ICD-10-CM

## 2022-03-02 DIAGNOSIS — I10 PRIMARY HYPERTENSION: ICD-10-CM

## 2022-03-02 DIAGNOSIS — G44.209 TENSION VASCULAR HEADACHE: Primary | ICD-10-CM

## 2022-03-02 LAB
ANION GAP SERPL CALCULATED.3IONS-SCNC: 10 MMOL/L (ref 7–16)
ANISOCYTOSIS: ABNORMAL
ATYPICAL LYMPHOCYTE RELATIVE PERCENT: 4.4 % (ref 0–4)
BASOPHILS ABSOLUTE: 0 E9/L (ref 0–0.2)
BASOPHILS RELATIVE PERCENT: 0 % (ref 0–2)
BUN BLDV-MCNC: 8 MG/DL (ref 6–20)
CALCIUM SERPL-MCNC: 10.3 MG/DL (ref 8.6–10.2)
CHLORIDE BLD-SCNC: 104 MMOL/L (ref 98–107)
CO2: 24 MMOL/L (ref 22–29)
CREAT SERPL-MCNC: 0.8 MG/DL (ref 0.5–1)
EKG ATRIAL RATE: 74 BPM
EKG P AXIS: 5 DEGREES
EKG P-R INTERVAL: 196 MS
EKG Q-T INTERVAL: 394 MS
EKG QRS DURATION: 100 MS
EKG QTC CALCULATION (BAZETT): 437 MS
EKG R AXIS: -12 DEGREES
EKG T AXIS: 86 DEGREES
EKG VENTRICULAR RATE: 74 BPM
EOSINOPHILS ABSOLUTE: 0.33 E9/L (ref 0.05–0.5)
EOSINOPHILS RELATIVE PERCENT: 4.4 % (ref 0–6)
GFR AFRICAN AMERICAN: >60
GFR NON-AFRICAN AMERICAN: >60 ML/MIN/1.73
GLUCOSE BLD-MCNC: 113 MG/DL (ref 74–99)
HCT VFR BLD CALC: 36.7 % (ref 34–48)
HEMOGLOBIN: 10.8 G/DL (ref 11.5–15.5)
LYMPHOCYTES ABSOLUTE: 1.9 E9/L (ref 1.5–4)
LYMPHOCYTES RELATIVE PERCENT: 21 % (ref 20–42)
MCH RBC QN AUTO: 23.6 PG (ref 26–35)
MCHC RBC AUTO-ENTMCNC: 29.4 % (ref 32–34.5)
MCV RBC AUTO: 80.3 FL (ref 80–99.9)
MONOCYTES ABSOLUTE: 0.3 E9/L (ref 0.1–0.95)
MONOCYTES RELATIVE PERCENT: 3.5 % (ref 2–12)
MYELOCYTE PERCENT: 0.9 % (ref 0–0)
NEUTROPHILS ABSOLUTE: 5.09 E9/L (ref 1.8–7.3)
NEUTROPHILS RELATIVE PERCENT: 65.8 % (ref 43–80)
NUCLEATED RED BLOOD CELLS: 0 /100 WBC
OVALOCYTES: ABNORMAL
PDW BLD-RTO: 20.7 FL (ref 11.5–15)
PLATELET # BLD: 369 E9/L (ref 130–450)
PMV BLD AUTO: 9.5 FL (ref 7–12)
POIKILOCYTES: ABNORMAL
POLYCHROMASIA: ABNORMAL
POTASSIUM REFLEX MAGNESIUM: 3.7 MMOL/L (ref 3.5–5)
RBC # BLD: 4.57 E12/L (ref 3.5–5.5)
SODIUM BLD-SCNC: 138 MMOL/L (ref 132–146)
TROPONIN, HIGH SENSITIVITY: <6 NG/L (ref 0–9)
WBC # BLD: 7.6 E9/L (ref 4.5–11.5)

## 2022-03-02 PROCEDURE — 84484 ASSAY OF TROPONIN QUANT: CPT

## 2022-03-02 PROCEDURE — 93005 ELECTROCARDIOGRAM TRACING: CPT | Performed by: EMERGENCY MEDICINE

## 2022-03-02 PROCEDURE — 85025 COMPLETE CBC W/AUTO DIFF WBC: CPT

## 2022-03-02 PROCEDURE — G8482 FLU IMMUNIZE ORDER/ADMIN: HCPCS | Performed by: PSYCHIATRY & NEUROLOGY

## 2022-03-02 PROCEDURE — 1036F TOBACCO NON-USER: CPT | Performed by: PSYCHIATRY & NEUROLOGY

## 2022-03-02 PROCEDURE — 71045 X-RAY EXAM CHEST 1 VIEW: CPT

## 2022-03-02 PROCEDURE — 80048 BASIC METABOLIC PNL TOTAL CA: CPT

## 2022-03-02 PROCEDURE — G8428 CUR MEDS NOT DOCUMENT: HCPCS | Performed by: PSYCHIATRY & NEUROLOGY

## 2022-03-02 PROCEDURE — 99214 OFFICE O/P EST MOD 30 MIN: CPT | Performed by: PSYCHIATRY & NEUROLOGY

## 2022-03-02 PROCEDURE — 99282 EMERGENCY DEPT VISIT SF MDM: CPT

## 2022-03-02 PROCEDURE — G8417 CALC BMI ABV UP PARAM F/U: HCPCS | Performed by: PSYCHIATRY & NEUROLOGY

## 2022-03-02 PROCEDURE — 93010 ELECTROCARDIOGRAM REPORT: CPT | Performed by: INTERNAL MEDICINE

## 2022-03-02 PROCEDURE — 3017F COLORECTAL CA SCREEN DOC REV: CPT | Performed by: PSYCHIATRY & NEUROLOGY

## 2022-03-02 ASSESSMENT — ENCOUNTER SYMPTOMS
RESPIRATORY NEGATIVE: 1
EYES NEGATIVE: 1
GASTROINTESTINAL NEGATIVE: 1
ALLERGIC/IMMUNOLOGIC NEGATIVE: 1

## 2022-03-02 NOTE — ED PROVIDER NOTES
ED Attending shared visit  CC: Candace        [facility]  Department of Emergency Medicine   ED  Encounter Note  Admit Date/RoomTime: 3/2/2022 10:57 AM  ED Room:     NAME: Alta Covarrubias  : 1960  MRN: [id number]     Chief Complaint:  Hypertension (HYPERTENSION SENT BY PCP AFTER ROUTINE CHECK UP, HX OF HTN)    History of Present Illness       Alta Covarrubias is a 63 y.o. old female who presents to the emergency department by private vehicle, for evaluation of high blood pressure, which began several month(s) prior to arrival.  Since onset the symptoms have been intermittent. She has a prior history of hypertension. Patient states that she has had issues with her blood pressure being elevated over the past several months. She states that she was at her neurologist today for a follow-up visit, and her blood pressure was elevated. Patient states that she took her normal medications this morning which consist of metoprolol and Cozaar. She denies chest pain or shortness of breath. Patient states that she does have episodes of intermittent dizziness which have been ongoing. Denies vision changes. Denies loss of consciousness. Patient states that she has been following with her primary care doctor in regards to her hypertension. Patient appears well, non-toxic and in no acute distress. No other complaints or concerns at this time. Severity:  How High: 213/98. Symptoms:  Mild. Current Pregnancy:  No.  HTN Treatment: compliant. Recent Use of:  No OTC or illegal substances. Associated Signs & Symptoms:    []   Abdominal Pain     []   Back Pain     []   Hematuria     []   Chest Pain     []   Shortness of Breath     []   Palpitations     []   Headache     [x]   Dizziness     []   Blurred Vision       ROS   Pertinent positives and negatives are stated within HPI, all other systems reviewed and are negative.     Past Medical History:  has a past medical history of Altered bowel elimination due to intestinal ostomy ([facility] [address] Anemia, Arthritis, Crohn disease ([facility] [address] Fatty liver, Hernia, History of DVT (deep vein thrombosis), History of pulmonary embolus (PE), Hyperlipemia, Hypertension, Hypothyroidism, Intervertebral lumbar disc disorder with myelopathy, lumbar region, Leukocytosis, Migraine variant with headache, Movement disorder, MRSA infection, Obesity, Osteoarthritis of both knees, Palpitations, PE (pulmonary embolism), Rash, Syncope, Thyroid disease, Uterine fibroid, and Vitamin D deficiency. Surgical History:  has a past surgical history that includes Cholecystectomy; Tubal ligation; hernia repair; Endometrial ablation; Tonsillectomy; Colonoscopy; Abdomen surgery (11/2/14); Total knee arthroplasty (Bilateral, 2013); Gastrostomy tube placement; Tunneled venous catheter placement; colectomy; colectomy; joint replacement (11/2013); ileostomy or jejunostomy; and Carpal tunnel release. Social History:  reports that she has never smoked. She has never used smokeless tobacco. She reports that she does not drink alcohol and does not use drugs. Family History: family history includes Hypertension in her mother. Allergies: Amoxicillin-pot clavulanate, Biaxin [clarithromycin], Cefaclor, Clavulanic acid, Doxycycline, and Macrobid [nitrofurantoin]    Physical Exam   Oxygen Saturation Interpretation: Normal.        ED Triage Vitals   BP Temp Temp src Pulse Resp SpO2 Height Weight   03/02/22 1059 03/02/22 1058 -- 03/02/22 1059 03/02/22 1058 03/02/22 1058 -- --   (!) 213/98 97.4 °F (36.3 °C)  76 16 100 %           Constitutional:  Alert, development consistent with age. Patient resting comfortably in bed, and appears in no acute distress. Eyes:  PERRL, EOMI, no discharge or conjunctival injection. Ears:  External ears without lesions. Throat:  Pharynx without injection, exudate, or tonsillar hypertrophy. Airway patient. Neck:  Normal ROM. Supple.    Respiratory:  Clear to auscultation and breath sounds equal.  CV:  Regular rate and rhythm, normal heart sounds, without pathological murmurs, ectopy, gallops, or rubs. Hypertensive. No chest wall tenderness upon palpation. GI:  Abdomen Soft, nontender, good bowel sounds. No firm or pulsatile mass. Back:  No costovertebral tenderness. Integument:  Normal turgor. Warm, dry, without visible rash, unless noted elsewhere. Lymphatics: No lymphangitis or adenopathy noted. Neurological:  Oriented. Motor functions intact.     Lab / Imaging Results   (All laboratory and radiology results have been personally reviewed by myself)  Labs:  Results for orders placed or performed during the hospital encounter of 03/02/22   CBC with Auto Differential   Result Value Ref Range    WBC 7.6 4.5 - 11.5 E9/L    RBC 4.57 3.50 - 5.50 E12/L    Hemoglobin 10.8 (L) 11.5 - 15.5 g/dL    Hematocrit 36.7 34.0 - 48.0 %    MCV 80.3 80.0 - 99.9 fL    MCH 23.6 (L) 26.0 - 35.0 pg    MCHC 29.4 (L) 32.0 - 34.5 %    RDW 20.7 (H) 11.5 - 15.0 fL    Platelets 935 699 - 405 E9/L    MPV 9.5 7.0 - 12.0 fL    Neutrophils % 65.8 43.0 - 80.0 %    Lymphocytes % 21.0 20.0 - 42.0 %    Monocytes % 3.5 2.0 - 12.0 %    Eosinophils % 4.4 0.0 - 6.0 %    Basophils % 0.0 0.0 - 2.0 %    Neutrophils Absolute 5.09 1.80 - 7.30 E9/L    Lymphocytes Absolute 1.90 1.50 - 4.00 E9/L    Monocytes Absolute 0.30 0.10 - 0.95 E9/L    Eosinophils Absolute 0.33 0.05 - 0.50 E9/L    Basophils Absolute 0.00 0.00 - 0.20 E9/L    Atypical Lymphocytes Relative 4.4 (H) 0.0 - 4.0 %    Myelocyte Percent 0.9 0 - 0 %    nRBC 0.0 /100 WBC    Anisocytosis 1+     Polychromasia 1+     Poikilocytes 1+     Ovalocytes 1+    Basic Metabolic Panel w/ Reflex to MG   Result Value Ref Range    Sodium 138 132 - 146 mmol/L    Potassium reflex Magnesium 3.7 3.5 - 5.0 mmol/L    Chloride 104 98 - 107 mmol/L    CO2 24 22 - 29 mmol/L    Anion Gap 10 7 - 16 mmol/L    Glucose 113 (H) 74 - 99 mg/dL    BUN 8 6 - 20 mg/dL    CREATININE 0.8 0.5 - 1.0 mg/dL    GFR Non-African American >60 >=60 mL/min/1.73    GFR African American >60     Calcium 10.3 (H) 8.6 - 10.2 mg/dL   Troponin   Result Value Ref Range    Troponin, High Sensitivity <6 0 - 9 ng/L   EKG 12 Lead   Result Value Ref Range    Ventricular Rate 74 BPM    Atrial Rate 74 BPM    P-R Interval 196 ms    QRS Duration 100 ms    Q-T Interval 394 ms    QTc Calculation (Bazett) 437 ms    P Axis 5 degrees    R Axis -12 degrees    T Axis 86 degrees     Imaging: All Radiology results interpreted by Radiologist unless otherwise noted. XR CHEST PORTABLE   Final Result   No acute process. EKG #1:  Interpreted by emergency department attending physician unless otherwise noted. 3/2/22  Time: 1108    Rhythm: normal sinus   Rate: normal  Axis: normal  Conduction: right bundle branch block (incomplete)  ST Segments: no acute change  T Waves: no acute change    Clinical Impression: no acute changes  Comparison to Prior tracings: There are new findings on today's ECG when compared to prior tracings. ED Course / Medical Decision Making   Medications - No data to display     Re-Evaluations:  3/2/22      Time: 1220    Patients condition remains stable. Consultations:             None    Procedures:   none    MDM: Patient is to the emergency department for evaluation of high blood pressure, and was sent in by her neurologist.  She complains of intermittent dizziness, but denies vision changes, chest pain or shortness of breath. Patient had a recent MRI in December 2021 to evaluate her vascular tension headaches. Patient appears well, nontoxic, and in no acute distress. Blood work and imaging were obtained. X-ray showed no acute processes. CBC showed no evidence of a leukocytosis, or electrolyte imbalances. Patient's troponin level was within normal limits. EKG did show a incomplete right bundle yoan block that was not noted on a previous EKG from 2015.   Patient was given a referral to cardiology for follow-up regarding this finding. The findings were discussed with the patient, she verbalized understanding. Patient has had improvement of her blood pressure while in the department. Patient is also advised to make sure she is taking her blood pressure medications as prescribed. And follow-up with primary care doctor. She is advised to return to emergency department for any worsening of symptoms. At this time the patient is without objective evidence of an acute process requiring hospitalization or inpatient management. They have remained hemodynamically stable throughout their entire ED visit and are stable for discharge with outpatient follow-up. The plan has been discussed in detail and they are aware of the specific conditions for emergent return, as well as the importance of follow-up. Plan of Care/Counseling:  KASSANDRA Burgos CNP reviewed today's visit with the patient in addition to providing specific details for the plan of care and counseling regarding the diagnosis and prognosis. Questions are answered at this time and are agreeable with the plan. Assessment      1. Hypertension, unspecified type New Problem     This patient's ED course included: a personal history and physicial examination  This patient has remained hemodynamically stable during their ED course. Plan   Discharged home. Patient condition is good. New Medications     New Prescriptions    No medications on file     Electronically signed by KASSANDRA Burgos CNP   DD: 3/2/22  **This report was transcribed using voice recognition software. Every effort was made to ensure accuracy; however, inadvertent computerized transcription errors may be present.   END OF PROVIDER NOTE     KASSANDRA Burgos CNP  03/02/22 KASSANDRA Houston CNP  03/02/22 [id number]

## 2022-03-02 NOTE — TELEPHONE ENCOUNTER
Pt left voicemail was just at neurologist where bp was extremely elevated.   Was advised to go to ER

## 2022-03-02 NOTE — PROGRESS NOTES
Neurology Progress Note, Follow-up:    Patient: Rama Pop  : 1960  Date: 22  Primary provider: Clay Mace DO     Re: Followup OV, hx tension vascular headaches, history of migraines. Chronic hypertension. Dear Fabiola Diaz DO:    I have seen Seymour Alert for a follow-up office visit in regards to her history of tension vascular headaches and history of migraines. She also was noted to have chronic primary hypertension with poorly-controlled blood pressures at the time of her initial evaluation, 2022. She was supposed to be starting a new blood pressure medication by her account. She still remains on metoprolol and Cozaar and explains there have been no blood pressure medication changes. A medication trial of topiramate was advised for the migraine headache component. She was already prescribed trazodone for sleep, metoprolol and duloxetine. She has achieved some relief of her headaches with topiramate 25 mg twice daily. advised her to titrate further to 25 mg in the morning and 50 mg at bedtime of topiramate. I also advised her to go to the [facility] ED for evaluation and treatment of her accelerated hypertension with BP reading today of 180/120. She also states her blood pressures have been running high when she checks them at home. Please refer to the prior Neurology consult letter of 2022 for additional information if needed. ROS, family/social history, medication/allergy list: Reviewed, updated.     Current Outpatient Medications   Medication Sig Dispense Refill    ascorbic acid (VITAMIN C) 100 MG tablet       Biotin 5 MG CAPS Take 5 mg by mouth daily      L-Lysine 1000 MG TABS Take 1,000 mg by mouth daily      methotrexate Sodium (RHEUMATREX) 50 MG/2ML chemo injection INJECT 1 ML (25 MG) SUBCUTANEOUSLY ONCE A WEEK      omeprazole (PRILOSEC) 20 MG delayed release capsule Take 20 mg by mouth daily      thiamine 100 MG tablet       STELARA 90 MG/ML SOSY prefilled syringe       topiramate (TOPAMAX) 25 MG tablet Take 1 tablet by mouth 2 times daily In 2 wks, may increase to 2 twice daily 124 tablet 5    levothyroxine (SYNTHROID) 125 MCG tablet Take 1 tablet by mouth daily 90 tablet 3    metoprolol tartrate (LOPRESSOR) 25 MG tablet Take 1 tablet by mouth 2 times daily 180 tablet 5    losartan (COZAAR) 100 MG tablet Take 1 tablet by mouth daily 90 tablet 5    potassium chloride (KLOR-CON M) 20 MEQ extended release tablet Take 1 tablet by mouth 2 times daily 180 tablet 5    traZODone (DESYREL) 50 MG tablet 2 q hs 180 tablet 5    DULoxetine (CYMBALTA) 30 MG extended release capsule Take 30 mg by mouth daily      sulconazole (EXELDERM) 1 % cream Apply topically bid 1 Tube 12    oxiconazole nitrate (OXISTAT) 1 % CREA cream Bid to breast fold 1 Tube 12    inFLIXimab (REMICADE) 100 MG injection Infuse 800 mg intravenously See Admin Instructions Indications: 800 MG every 5 weeks Over 2 hours every 8 weeks       Methotrexate, Anti-Rheumatic, (METHOTREXATE, PF, SC) Inject 1 mL into the skin once a week      magnesium (MAGNESIUM-OXIDE) 250 MG TABS tablet Take 250 mg by mouth daily      Cholecalciferol (VITAMIN D3) 2000 UNITS CAPS Take 3,000 Int'l Units by mouth daily. No current facility-administered medications for this visit.        Allergies   Allergen Reactions    Amoxicillin-Pot Clavulanate Rash    Biaxin [Clarithromycin] Hives and Rash    Cefaclor Hives and Rash     rash    Clavulanic Acid Rash    Doxycycline Rash    Macrobid [Nitrofurantoin] Nausea And Vomiting       Patient Active Problem List   Diagnosis    Cro[facility]'s colitis ([facility] [address]    Essential hypertension    Arthritis    Thyroid disease    Class 3 severe obesity due to excess calories with serious comorbidity in adult [facility])    Primary osteoarthritis involving multiple joints    Enterocutaneous fistula    History of DVT (deep vein thrombosis)    History of pulmonary embolus (PE)  Acquired hypothyroidism    Ventral hernia without obstruction or gangrene    Dermatitis    Anxiety    Tension vascular headache    Migraine variant with headache       Past Medical History:   Diagnosis Date    Altered bowel elimination due to intestinal ostomy ([facility] [address]     Anemia     Arthritis     Crohn disease ([facility] [address]     Fatty liver     Hernia     History of DVT (deep vein thrombosis) 6/10/2015    History of pulmonary embolus (PE) 6/10/2015    Hyperlipemia     Hypertension     Hypothyroidism     Intervertebral lumbar disc disorder with myelopathy, lumbar region     Leukocytosis     Migraine variant with headache 1/26/2022    Movement disorder     MRSA infection     UTI    Obesity     Osteoarthritis of both knees     Palpitations     PE (pulmonary embolism)     Rash     fine skin rash not itchy gastro dr aware    Syncope     Thyroid disease     Uterine fibroid     Vitamin D deficiency        Past Surgical History:   Procedure Laterality Date    ABDOMEN SURGERY  11/2/14    I&d abdominal wound    CARPAL TUNNEL RELEASE      CHOLECYSTECTOMY      COLECTOMY      ostomy    COLECTOMY      COLONOSCOPY      ENDOMETRIAL ABLATION      GASTROSTOMY TUBE PLACEMENT      HERNIA REPAIR      ILEOSTOMY OR JEJUNOSTOMY      placed 2/2015 reversed 9/2015    JOINT REPLACEMENT  11/2013    both knees    TONSILLECTOMY      TOTAL KNEE ARTHROPLASTY Bilateral 2013    TUBAL LIGATION      TUNNELED VENOUS CATHETER PLACEMENT         Family History   Problem Relation Age of Onset    Hypertension Mother        Social History     Socioeconomic History    Marital status:      Spouse name: Not on file    Number of children: Not on file    Years of education: Not on file    Highest education level: Not on file   Occupational History     Employer: [employer]   Tobacco Use    Smoking status: Never Smoker    Smokeless tobacco: Never Used   Vaping Use    Vaping Use: Never used Substance and Sexual Activity    Alcohol use: No    Drug use: No    Sexual activity: Not on file   Other Topics Concern    Not on file   Social History Narrative        Tetanus Immunization - (8/14/2017)    HYPOTHYROID    HTN    ELEV LFT    FATTY LIVER    S/P UMBILICAL HERNIA OR AND SUBSEQUEST INCISIONAL HERNIA OR 8-06    LEUKOCYTOSIS JEMMA    CTS NO OR    OA KNEES SYNVISC----DR ODONNELL    ALLERGY TO DISSOLVABLE SUTURES    UTERINE FIBROID    ----OSP---STATE longterm---[facility]    --3    [employer]    OBESITY    UTERINE ABLATION    LOW VIT D 7-10    ECHO 7 -10 STAGE ONE SYED DYSFX    PALP---DR TERRY    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     5-12----DIV 5-13    COLON 1-13 WITH YURICH---TOLD ULCERATIVE COLITIS--NO HELP WITH ASACOL AND BX NEG    DC WTIH INFL BOWEL DIS (CROHNS) AND JOINT SWELLING---REGULE IV STEROIDS---DC ON    PRED AND ANEMIA    STARTED HUMIRA 5-13    BILATERAL TOTAL KNEE DR ODONNELL 11-13    ADMIT 3-14 WITH FLARE OF CROHNS DIS    ADMIT WITH BRONCHITIS 4-14    FLARE CROHNS DIS    COLONOSCOPY 10-14 [facility]----ACTIVE CROHNS COLITIS IN SIGMOID    ADMIT 11-14 WITH VENTRAL WALL ABSCESS AND POSS FISTULA    SYNCOPE 11-14 WITH LACERATION SCALP    OR [facility] 2-11-15---- FOR PARTIAL BOWEL RESECTION--- PEG TUBE IN----DUE TO INFECTED    MESH    admit 5-15 with UTI SEPSIS    ILEOSTOMY REVERSAL 9-15 [facility]    MRSA UTI 10-15    START REMICADE 12-15    eval dr Garcia Masters  3-19 with shots back   Mri with 5 herniated disc    Admit  11-20  With  Ventral hernia fistula  At  [facility]    Son surekha oliveros from [facility]  then  [employer]  for phd program    biochem    Son engaged     Social Determinants of Health     Financial Resource Strain:     Difficulty of Paying Living Expenses: Not on file   Food Insecurity:     Worried About Running Out of Food in the Last Year: Not on file    Richi of Food in the Last Year: Not on file   Transportation Needs:     Lack of Transportation (Medical): Not on file    Lack of Transportation (Non-Medical): Not on file   Physical Activity:     Days of Exercise per Week: Not on file    Minutes of Exercise per Session: Not on file   Stress:     Feeling of Stress : Not on file   Social Connections:     Frequency of Communication with Friends and Family: Not on file    Frequency of Social Gatherings with Friends and Family: Not on file    Attends Protestant Services: Not on file    Active Member of [address] [place] [address] [employer] or Organizations: Not on file    Attends Club or Organization Meetings: Not on file    Marital Status: Not on file   Intimate Partner Violence:     Fear of Current or Ex-Partner: Not on file    Emotionally Abused: Not on file    Physically Abused: Not on file    Sexually Abused: Not on file   Housing Stability:     Unable to Pay for Housing in the Last Year: Not on file    Number of [place] in the Last Year: Not on file    Unstable Housing in the Last Year: Not on file     Review of Systems   Constitutional: Positive for fatigue. HENT: Negative. Eyes: Negative. Respiratory: Negative. Cardiovascular: Negative. Hypertensive   Gastrointestinal: Negative. Endocrine: Negative. Genitourinary: Negative. Musculoskeletal: Negative. Skin: Negative. Allergic/Immunologic: Negative. Neurological: Positive for light-headedness and headaches. Hematological: Negative. Psychiatric/Behavioral: The patient is nervous/anxious. All other systems reviewed and are negative. Neurologic Exam:  BP (!) 180/120 (Cuff Size: Medium Adult)   Ht 5' 2\" (1.575 m)   Wt 200 lb (90.7 kg)   BMI 36.58 kg/m²    Mental Status: Alert, cooperative, anxious, obese, well-groomed, seated in the exam room, no acute distress; hypertensive. CN's II-XII: Remains grossly intact throughout. Pupils appear equal and reactive to light. EOMs are full without nystagmus. Visual fields remain full.   Facial expression is mildly decreased and facial sensation is normal.  There is no facial droop. Hearing is grossly intact. The tongue is midline. Motor/Sensory Exam: Grossly intact strength in the upper and lower extremities without tremor or drift and normal motor tone, intact fine motor function of both hands, symmetric. No pathologic reflexes. Sensory modalities remain grossly intact subjectively. Coordination/Gait: No limb dysmetria on finger-to-nose testing or intention tremors. No gait ataxia. Assessment/Plan:   1. Tension vascular headache, migraine variant, presently treated with topiramate 25 mg twice daily with some clinical improvement. She is advised to increase topiramate to 25 mg in the morning and 50 mg at bedtime for headache prophylaxis. 2.  Hypertensive, with accelerated hypertension, BP reading today of 180/120, advised to go to ED for further evaluation and treatment. 3.  Follow-up in the Neurology clinic in 6 months otherwise. Sincerely,      Martín Perez MD     Neurology & Clinical Neurophysiology    Please note this report has been partially produced using speech recognition software and may cause contain errors related to that system including grammar, punctuation and spelling or words and phrases that may not seem appropriate. If there are questions or concerns please feel free to contact me to clarify. Note: A total time of 25 mins. was spent on the date of service in preparation for this visit, which included face-to-face patient care, completing clinical documentation, counseling and coordination of care based on clinical impression, neurologic diagnosis, review of pertinent imaging studies, test results, implementation and discussion of treatment plan, risk factor reduction and patient and/or family education.

## 2022-03-04 ENCOUNTER — TELEPHONE (OUTPATIENT)
Dept: VASCULAR SURGERY | Age: 62
End: 2022-03-04

## 2022-03-04 ENCOUNTER — OFFICE VISIT (OUTPATIENT)
Dept: PRIMARY CARE CLINIC | Age: 62
End: 2022-03-04
Payer: COMMERCIAL

## 2022-03-04 VITALS
WEIGHT: 210 LBS | BODY MASS INDEX: 38.64 KG/M2 | TEMPERATURE: 95.3 F | SYSTOLIC BLOOD PRESSURE: 182 MMHG | HEIGHT: 62 IN | DIASTOLIC BLOOD PRESSURE: 95 MMHG

## 2022-03-04 DIAGNOSIS — K50.10 CROHN'S DISEASE OF COLON WITHOUT COMPLICATION (HCC): Chronic | ICD-10-CM

## 2022-03-04 DIAGNOSIS — M15.9 PRIMARY OSTEOARTHRITIS INVOLVING MULTIPLE JOINTS: ICD-10-CM

## 2022-03-04 DIAGNOSIS — G43.809 MIGRAINE VARIANT WITH HEADACHE: Chronic | ICD-10-CM

## 2022-03-04 DIAGNOSIS — G45.8 SUBCLAVIAN ARTERY OCCLUSIVE SYNDROME: ICD-10-CM

## 2022-03-04 DIAGNOSIS — I10 UNCONTROLLED HYPERTENSION: Primary | ICD-10-CM

## 2022-03-04 PROCEDURE — G8428 CUR MEDS NOT DOCUMENT: HCPCS | Performed by: FAMILY MEDICINE

## 2022-03-04 PROCEDURE — 3017F COLORECTAL CA SCREEN DOC REV: CPT | Performed by: FAMILY MEDICINE

## 2022-03-04 PROCEDURE — 99214 OFFICE O/P EST MOD 30 MIN: CPT | Performed by: FAMILY MEDICINE

## 2022-03-04 PROCEDURE — G8417 CALC BMI ABV UP PARAM F/U: HCPCS | Performed by: FAMILY MEDICINE

## 2022-03-04 PROCEDURE — G8482 FLU IMMUNIZE ORDER/ADMIN: HCPCS | Performed by: FAMILY MEDICINE

## 2022-03-04 PROCEDURE — 1036F TOBACCO NON-USER: CPT | Performed by: FAMILY MEDICINE

## 2022-03-04 RX ORDER — CARVEDILOL 3.12 MG/1
TABLET ORAL
Qty: 120 TABLET | Refills: 3 | Status: SHIPPED
Start: 2022-03-04 | End: 2022-03-28

## 2022-03-04 ASSESSMENT — PATIENT HEALTH QUESTIONNAIRE - PHQ9
SUM OF ALL RESPONSES TO PHQ9 QUESTIONS 1 & 2: 0
SUM OF ALL RESPONSES TO PHQ QUESTIONS 1-9: 0
2. FEELING DOWN, DEPRESSED OR HOPELESS: 0
SUM OF ALL RESPONSES TO PHQ QUESTIONS 1-9: 0
1. LITTLE INTEREST OR PLEASURE IN DOING THINGS: 0

## 2022-03-04 NOTE — PROGRESS NOTES
3/4/22  Name: Vonda Carrel    : 1960    Sex: female    Age: 63 y.o. Subjective:  Chief Complaint: Patient is here for  elev  bp  And er follow up     elev  bp for months   She ignored  tining it was  Digital in doc [employer] to er  Not given any new meds  She says at home  Was 220-120  Before  Neuro appt   Neuro inc  topamax to   25---50  Gastro doc [facility]   Told     Add k but she already on it  k  Good in er  Does nto take  Any nsaid pr  Sinus med      Review of Systems   Constitutional: Negative. HENT: Negative. Eyes: Negative. Respiratory: Negative. Cardiovascular: Negative. Gastrointestinal: Negative. Endocrine: Negative. Genitourinary: Negative. Musculoskeletal: Negative. Skin: Negative. Allergic/Immunologic: Negative. Neurological: Negative. Hematological: Negative. Psychiatric/Behavioral: Negative.           Current Outpatient Medications:     carvedilol (COREG) 3.125 MG tablet, One bid for 7  Days then two bid, Disp: 120 tablet, Rfl: 3    ascorbic acid (VITAMIN C) 100 MG tablet, , Disp: , Rfl:     Biotin 5 MG CAPS, Take 5 mg by mouth daily, Disp: , Rfl:     L-Lysine 1000 MG TABS, Take 1,000 mg by mouth daily, Disp: , Rfl:     methotrexate Sodium (RHEUMATREX) 50 MG/2ML chemo injection, INJECT 1 ML (25 MG) SUBCUTANEOUSLY ONCE A WEEK, Disp: , Rfl:     omeprazole (PRILOSEC) 20 MG delayed release capsule, Take 20 mg by mouth daily, Disp: , Rfl:     thiamine 100 MG tablet, , Disp: , Rfl:     STELARA 90 MG/ML SOSY prefilled syringe, , Disp: , Rfl:     topiramate (TOPAMAX) 25 MG tablet, Take 1 tablet by mouth 2 times daily In 2 wks, may increase to 2 twice daily, Disp: 124 tablet, Rfl: 5    levothyroxine (SYNTHROID) 125 MCG tablet, Take 1 tablet by mouth daily, Disp: 90 tablet, Rfl: 3    metoprolol tartrate (LOPRESSOR) 25 MG tablet, Take 1 tablet by mouth 2 times daily, Disp: 180 tablet, Rfl: 5    losartan (COZAAR) 100 MG tablet, Take 1 tablet by mouth daily, Disp: 90 tablet, Rfl: 5    potassium chloride (KLOR-CON M) 20 MEQ extended release tablet, Take 1 tablet by mouth 2 times daily, Disp: 180 tablet, Rfl: 5    traZODone (DESYREL) 50 MG tablet, 2 q hs, Disp: 180 tablet, Rfl: 5    DULoxetine (CYMBALTA) 30 MG extended release capsule, Take 30 mg by mouth daily, Disp: , Rfl:     sulconazole (EXELDERM) 1 % cream, Apply topically bid, Disp: 1 Tube, Rfl: 12    oxiconazole nitrate (OXISTAT) 1 % CREA cream, Bid to breast fold, Disp: 1 Tube, Rfl: 12    inFLIXimab (REMICADE) 100 MG injection, Infuse 800 mg intravenously See Admin Instructions Indications: 800 MG every 5 weeks Over 2 hours every 8 weeks , Disp: , Rfl:     Methotrexate, Anti-Rheumatic, (METHOTREXATE, PF, SC), Inject 1 mL into the skin once a week, Disp: , Rfl:     magnesium (MAGNESIUM-OXIDE) 250 MG TABS tablet, Take 250 mg by mouth daily, Disp: , Rfl:     Cholecalciferol (VITAMIN D3) 2000 UNITS CAPS, Take 3,000 Int'l Units by mouth daily. , Disp: , Rfl:   Allergies   Allergen Reactions    Amoxicillin-Pot Clavulanate Rash    Biaxin [Clarithromycin] Hives and Rash    Cefaclor Hives and Rash     rash    Clavulanic Acid Rash    Doxycycline Rash    Macrobid [Nitrofurantoin] Nausea And Vomiting     Social History     Socioeconomic History    Marital status:      Spouse name: Not on file    Number of children: Not on file    Years of education: Not on file    Highest education level: Not on file   Occupational History     Employer: [employer]   Tobacco Use    Smoking status: Never Smoker    Smokeless tobacco: Never Used   Vaping Use    Vaping Use: Never used   Substance and Sexual Activity    Alcohol use: No    Drug use: No    Sexual activity: Not on file   Other Topics Concern    Not on file   Social History Narrative        Tetanus Immunization - (8/14/2017)    HYPOTHYROID    HTN    ELEV LFT    FATTY LIVER    S/P UMBILICAL HERNIA OR AND SUBSEQUEST INCISIONAL HERNIA OR 8-06    LEUKOCYTOSIS JEMMA    CTS NO OR    OA KNEES SYNVISC----DR ODONNELL    ALLERGY TO DISSOLVABLE SUTURES    UTERINE FIBROID    ----[facility]--3    WORKS [employer]    OBESITY    UTERINE ABLATION    LOW VIT D 7-10    ECHO 7 -10 STAGE ONE SYED DYSFX    PALP---DR TERRY    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     5-12----DIV 5-13    COLON 1-13 WITH YURICH---TOLD ULCERATIVE COLITIS--NO HELP WITH ASACOL AND BX NEG    DC WTIH INFL BOWEL DIS (CROHNS) AND JOINT SWELLING---REGULE IV STEROIDS---DC ON    PRED AND ANEMIA    STARTED HUMIRA 5-13    BILATERAL TOTAL KNEE DR ODONNELL 11-13    ADMIT 3-14 WITH FLARE OF CROHNS DIS    ADMIT WITH BRONCHITIS 4-14    FLARE CROHNS DIS    COLONOSCOPY 10-14 [facility]----ACTIVE CROHNS COLITIS IN SIGMOID    ADMIT 11-14 WITH VENTRAL WALL ABSCESS AND POSS FISTULA    SYNCOPE 11-14 WITH LACERATION SCALP    OR [facility] 2-11-15---- FOR PARTIAL BOWEL RESECTION--- PEG TUBE IN----DUE TO INFECTED    MESH    admit 5-15 with UTI SEPSIS    ILEOSTOMY REVERSAL 9-15 [facility]    MRSA UTI 10-15    START REMICADE 12-15    eval dr Sagar Juárez  3-19 with shots back   Mri with 5 herniated disc    Admit  11-20  With  Ventral hernia fistula  At  [facility]    Son surekha oliveros from [facility] a and   for phd program    biochem    Son engaged     Social Determinants of Health     Financial Resource Strain:     Difficulty of Paying Living Expenses: Not on file   Food Insecurity:     Worried About Running Out of Food in the Last Year: Not on file    Richi of Food in the Last Year: Not on file   Transportation Needs:     Lack of Transportation (Medical): Not on file    Lack of Transportation (Non-Medical):  Not on file   Physical Activity:     Days of Exercise per Week: Not on file    Minutes of Exercise per Session: Not on file   Stress:     Feeling of Stress : Not on file   Social Connections:     Frequency of Communication with Friends and Family: Not on file    Frequency of Social Gatherings with Friends and Family: Not on file    Attends Denominational Services: Not on file    Active Member of Clubs or Organizations: Not on file    Attends Club or Organization Meetings: Not on file    Marital Status: Not on file   Intimate Partner Violence:     Fear of Current or Ex-Partner: Not on file    Emotionally Abused: Not on file    Physically Abused: Not on file    Sexually Abused: Not on file   Housing Stability:     Unable to Pay for Housing in the Last Year: Not on file    Number of [place] in the Last Year: Not on file    Unstable Housing in the Last Year: Not on file      Past Medical History:   Diagnosis Date    Altered bowel elimination due to intestinal ostomy ([facility]     Anemia     Arthritis     Crohn disease ([facility]     Fatty liver     Hernia     History of DVT (deep vein thrombosis) 6/10/2015    History of pulmonary embolus (PE) 6/10/2015    Hyperlipemia     Hypertension     Hypothyroidism     Intervertebral lumbar disc disorder with myelopathy, lumbar region     Leukocytosis     Migraine variant with headache 1/26/2022    Movement disorder     MRSA infection     UTI    Obesity     Osteoarthritis of both knees     Palpitations     PE (pulmonary embolism)     Rash     fine skin rash not itchy gastro dr aware    Syncope     Thyroid disease     Uterine fibroid     Vitamin D deficiency      Family History   Problem Relation Age of Onset    Hypertension Mother       Past Surgical History:   Procedure Laterality Date    ABDOMEN SURGERY  11/2/14    I&d abdominal wound    CARPAL TUNNEL RELEASE      CHOLECYSTECTOMY      COLECTOMY      ostomy    COLECTOMY      COLONOSCOPY      ENDOMETRIAL ABLATION      GASTROSTOMY TUBE PLACEMENT      HERNIA REPAIR      ILEOSTOMY OR JEJUNOSTOMY      placed 2/2015 reversed 9/2015    JOINT REPLACEMENT  11/2013    both knees    TONSILLECTOMY      TOTAL KNEE ARTHROPLASTY Bilateral 2013    TUBAL LIGATION      TUNNELED VENOUS CATHETER PLACEMENT        Vitals:    03/04/22 0735   BP: (!) 182/95   Temp: 95.3 °F (35.2 °C)   TempSrc: Infrared   Weight: 210 lb (95.3 kg)   Height: 5' 2\" (1.575 m)       Objective:    Physical Exam  Vitals reviewed. Constitutional:       Appearance: She is well-developed. HENT:      Head: Normocephalic. Eyes:      Pupils: Pupils are equal, round, and reactive to light. Cardiovascular:      Rate and Rhythm: Normal rate and regular rhythm. Pulmonary:      Effort: Pulmonary effort is normal.      Breath sounds: Normal breath sounds. Abdominal:      Palpations: Abdomen is soft. Musculoskeletal:         General: Normal range of motion. Cervical back: Normal range of motion. Skin:     General: Skin is warm. Neurological:      Mental Status: She is alert and oriented to person, place, and time. Psychiatric:         Behavior: Behavior normal.         Simeon Mccarthy was seen today for hypertension. Diagnoses and all orders for this visit:    Uncontrolled hypertension  -     US RETROPERITONEAL JERRY; Future  -     carvedilol (COREG) 3.125 MG tablet; One bid for 7  Days then two bid    Crohn's disease of colon without complication ([address]    Migraine variant with headache    Primary osteoarthritis involving multiple joints    Subclavian artery occlusive syndrome  -     Alia Arceo MD, Vascular Surgery, [place]        Comments:   Dc slt  caffiene  No nsaids or sinuus  meds  Add     Coreg      JERRY  appt  Vas  One week      I educated the patient about all medications. Make sure they were correct and educated  on the risk associated with missing meds or taking them incorrectly. A list of medications is being sent home with patient today. Check blood pressure at home twice a day. Low-salt low caffeine diet. Call if systolic blood pressure is above 712 and diastolic blood pressures above 85.   Only use a upper arm digital cuff. Aggressive low-fat diet. Avoid red meats, greasy fried foods, dairy products. Avoid processed foods. Take cholesterol medications without food. I informed patient about the risk associated with noncompliance of medication and taking medications incorrectly. Appropriate follow-up with myself and all specialist.  Encourage family members to take active role in assisting with medications and medical care. If any confusion should develop to notify my office immediately to avoid risk of worsening medical condition  A great deal of time spent reviewing medications, diet, exercise, social issues. Also reviewing the chart before entering the room with patient and finishing charting after leaving patient's room. More than half of that time was spent face to face with the patient in counseling and coordinating care.       Follow Up: Return in about 2 weeks (around 3/18/2022) for See Referral.     Seen by:  Karlos Nix,

## 2022-03-16 DIAGNOSIS — F41.9 ANXIETY: ICD-10-CM

## 2022-03-16 DIAGNOSIS — I10 ESSENTIAL HYPERTENSION: Chronic | ICD-10-CM

## 2022-03-16 RX ORDER — LOSARTAN POTASSIUM 100 MG/1
100 TABLET ORAL DAILY
Qty: 90 TABLET | Refills: 3 | Status: SHIPPED | OUTPATIENT
Start: 2022-03-16

## 2022-03-16 RX ORDER — POTASSIUM CHLORIDE 20 MEQ/1
20 TABLET, EXTENDED RELEASE ORAL 2 TIMES DAILY
Qty: 180 TABLET | Refills: 3 | Status: SHIPPED | OUTPATIENT
Start: 2022-03-16

## 2022-03-16 RX ORDER — TRAZODONE HYDROCHLORIDE 50 MG/1
100 TABLET ORAL NIGHTLY
Qty: 180 TABLET | Refills: 3 | Status: SHIPPED | OUTPATIENT
Start: 2022-03-16

## 2022-03-18 ENCOUNTER — OFFICE VISIT (OUTPATIENT)
Dept: PRIMARY CARE CLINIC | Age: 62
End: 2022-03-18
Payer: COMMERCIAL

## 2022-03-18 VITALS
OXYGEN SATURATION: 96 % | DIASTOLIC BLOOD PRESSURE: 92 MMHG | TEMPERATURE: 98.2 F | HEIGHT: 62 IN | SYSTOLIC BLOOD PRESSURE: 178 MMHG | HEART RATE: 73 BPM | BODY MASS INDEX: 38.83 KG/M2 | WEIGHT: 211 LBS

## 2022-03-18 DIAGNOSIS — I10 ESSENTIAL HYPERTENSION: Primary | Chronic | ICD-10-CM

## 2022-03-18 DIAGNOSIS — G43.809 MIGRAINE VARIANT WITH HEADACHE: Chronic | ICD-10-CM

## 2022-03-18 DIAGNOSIS — E03.9 ACQUIRED HYPOTHYROIDISM: Chronic | ICD-10-CM

## 2022-03-18 DIAGNOSIS — G44.209 TENSION VASCULAR HEADACHE: ICD-10-CM

## 2022-03-18 DIAGNOSIS — K50.10 CROHN'S DISEASE OF COLON WITHOUT COMPLICATION (HCC): Chronic | ICD-10-CM

## 2022-03-18 DIAGNOSIS — I10 UNCONTROLLED HYPERTENSION: ICD-10-CM

## 2022-03-18 DIAGNOSIS — F41.9 ANXIETY: ICD-10-CM

## 2022-03-18 PROCEDURE — 1036F TOBACCO NON-USER: CPT | Performed by: FAMILY MEDICINE

## 2022-03-18 PROCEDURE — 3017F COLORECTAL CA SCREEN DOC REV: CPT | Performed by: FAMILY MEDICINE

## 2022-03-18 PROCEDURE — G8417 CALC BMI ABV UP PARAM F/U: HCPCS | Performed by: FAMILY MEDICINE

## 2022-03-18 PROCEDURE — 3078F DIAST BP <80 MM HG: CPT | Performed by: FAMILY MEDICINE

## 2022-03-18 PROCEDURE — 99213 OFFICE O/P EST LOW 20 MIN: CPT | Performed by: FAMILY MEDICINE

## 2022-03-18 PROCEDURE — 3074F SYST BP LT 130 MM HG: CPT | Performed by: FAMILY MEDICINE

## 2022-03-18 PROCEDURE — G8482 FLU IMMUNIZE ORDER/ADMIN: HCPCS | Performed by: FAMILY MEDICINE

## 2022-03-18 PROCEDURE — G8428 CUR MEDS NOT DOCUMENT: HCPCS | Performed by: FAMILY MEDICINE

## 2022-03-18 NOTE — PROGRESS NOTES
3/18/22  Name: Janak Fink    : 1960    Sex: female    Age: 63 y.o. Subjective:  Chief Complaint: Patient is here for   Two  Week  Ck  re  bp crohn    She  Says  bp up at home    No list and did nto bring machine    Around 140 systolic but she not asure  She g est us  Monday         Sees  Vas   Soon  No chest pain or shortness of breath. Meds noted. She just increased the Coreg to 2 twice daily yesterday. Crohn's disease is stable      Review of Systems   Constitutional: Negative. HENT: Negative. Eyes: Negative. Respiratory: Negative. Cardiovascular: Negative. Gastrointestinal: Negative. Endocrine: Negative. Genitourinary: Negative. Musculoskeletal: Negative. Skin: Negative. Allergic/Immunologic: Negative. Neurological: Negative. Hematological: Negative. Psychiatric/Behavioral: Negative.           Current Outpatient Medications:     traZODone (DESYREL) 50 MG tablet, Take 2 tablets by mouth nightly, Disp: 180 tablet, Rfl: 3    metoprolol tartrate (LOPRESSOR) 25 MG tablet, Take 1 tablet by mouth 2 times daily, Disp: 180 tablet, Rfl: 3    potassium chloride (KLOR-CON M) 20 MEQ extended release tablet, Take 1 tablet by mouth 2 times daily, Disp: 180 tablet, Rfl: 3    losartan (COZAAR) 100 MG tablet, Take 1 tablet by mouth daily, Disp: 90 tablet, Rfl: 3    carvedilol (COREG) 3.125 MG tablet, One bid for 7  Days then two bid, Disp: 120 tablet, Rfl: 3    ascorbic acid (VITAMIN C) 100 MG tablet, , Disp: , Rfl:     Biotin 5 MG CAPS, Take 5 mg by mouth daily, Disp: , Rfl:     L-Lysine 1000 MG TABS, Take 1,000 mg by mouth daily, Disp: , Rfl:     methotrexate Sodium (RHEUMATREX) 50 MG/2ML chemo injection, INJECT 1 ML (25 MG) SUBCUTANEOUSLY ONCE A WEEK, Disp: , Rfl:     omeprazole (PRILOSEC) 20 MG delayed release capsule, Take 20 mg by mouth daily, Disp: , Rfl:     thiamine 100 MG tablet, , Disp: , Rfl:     STELARA 90 MG/ML SOSY prefilled syringe, , Disp: , Rfl:     topiramate (TOPAMAX) 25 MG tablet, Take 1 tablet by mouth 2 times daily In 2 wks, may increase to 2 twice daily, Disp: 124 tablet, Rfl: 5    levothyroxine (SYNTHROID) 125 MCG tablet, Take 1 tablet by mouth daily, Disp: 90 tablet, Rfl: 3    DULoxetine (CYMBALTA) 30 MG extended release capsule, Take 30 mg by mouth daily, Disp: , Rfl:     sulconazole (EXELDERM) 1 % cream, Apply topically bid, Disp: 1 Tube, Rfl: 12    oxiconazole nitrate (OXISTAT) 1 % CREA cream, Bid to breast fold, Disp: 1 Tube, Rfl: 12    inFLIXimab (REMICADE) 100 MG injection, Infuse 800 mg intravenously See Admin Instructions Indications: 800 MG every 5 weeks Over 2 hours every 8 weeks , Disp: , Rfl:     Methotrexate, Anti-Rheumatic, (METHOTREXATE, PF, SC), Inject 1 mL into the skin once a week, Disp: , Rfl:     Cholecalciferol (VITAMIN D3) 2000 UNITS CAPS, Take 3,000 Int'l Units by mouth daily. , Disp: , Rfl:   Allergies   Allergen Reactions    Amoxicillin-Pot Clavulanate Rash    Biaxin [Clarithromycin] Hives and Rash    Cefaclor Hives and Rash     rash    Clavulanic Acid Rash    Doxycycline Rash    Macrobid [Nitrofurantoin] Nausea And Vomiting     Social History     Socioeconomic History    Marital status:      Spouse name: Not on file    Number of children: Not on file    Years of education: Not on file    Highest education level: Not on file   Occupational History     Employer: [employer]   Tobacco Use    Smoking status: Never Smoker    Smokeless tobacco: Never Used   Vaping Use    Vaping Use: Never used   Substance and Sexual Activity    Alcohol use: No    Drug use: No    Sexual activity: Not on file   Other Topics Concern    Not on file   Social History Narrative        Tetanus Immunization - (8/14/2017)    HYPOTHYROID    HTN    ELEV LFT    FATTY LIVER    S/P UMBILICAL HERNIA OR AND SUBSEQUEST INCISIONAL HERNIA OR 8-06    LEUKOCYTOSIS JEMMA    CTS NO OR    OA KNEES SYNVISC---- BELLE    ALLERGY TO DISSOLVABLE SUTURES    UTERINE FIBROID    ----[facility]P---[facility]--3    WORKS [employer]    OBESITY    UTERINE ABLATION    LOW VIT D 7-10    ECHO 7 -10 STAGE ONE SYED DYSFX    PALP---DR TERRY    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     5-12----DIV 5-13    COLON 1-13 WITH YURICH---TOLD ULCERATIVE COLITIS--NO HELP WITH ASACOL AND BX NEG    DC WTIH INFL BOWEL DIS (CROHNS) AND JOINT SWELLING---REGULE IV STEROIDS---DC ON    PRED AND ANEMIA    STARTED HUMIRA 5-13    BILATERAL TOTAL KNEE DR ODONNELL 11-13    ADMIT 3-14 WITH FLARE OF CROHNS DIS    ADMIT WITH BRONCHITIS 4-14    FLARE CROHNS DIS    COLONOSCOPY 10-14 [facility]----ACTIVE CROHNS COLITIS IN SIGMOID    ADMIT 11-14 WITH VENTRAL WALL ABSCESS AND POSS FISTULA    SYNCOPE 11-14 WITH LACERATION SCALP    OR [facility] 2-11-15---- FOR PARTIAL BOWEL RESECTION--- PEG TUBE IN----DUE TO INFECTED    MESH    admit 5-15 with UTI SEPSIS    ILEOSTOMY REVERSAL 9-15 [facility]    MRSA UTI 10-15    START REMICADE 12-15    eval dr Mary Jim  3-19 with shots back   Mri with 5 herniated disc    Admit  11-20  With  Ventral hernia fistula  At  [facility]    Son surekha oliveros from [facility]  then  [place] and   for phd program    biochem    Son engaged     Social Determinants of Health     Financial Resource Strain:     Difficulty of Paying Living Expenses: Not on file   Food Insecurity:     Worried About Running Out of Food in the Last Year: Not on file    Richi of Food in the Last Year: Not on file   Transportation Needs:     Lack of Transportation (Medical): Not on file    Lack of Transportation (Non-Medical):  Not on file   Physical Activity:     Days of Exercise per Week: Not on file    Minutes of Exercise per Session: Not on file   Stress:     Feeling of Stress : Not on file   Social Connections:     Frequency of Communication with Friends and Family: Not on file    Frequency of Social Gatherings with Friends and Family: Not on file    Attends Muslim Services: Not on file    Active Member of Clubs or Organizations: Not on file    Attends Club or Organization Meetings: Not on file    Marital Status: Not on file   Intimate Partner Violence:     Fear of Current or Ex-Partner: Not on file    Emotionally Abused: Not on file    Physically Abused: Not on file    Sexually Abused: Not on file   Housing Stability:     Unable to Pay for Housing in the Last Year: Not on file    Number of [place] in the Last Year: Not on file    Unstable Housing in the Last Year: Not on file      Past Medical History:   Diagnosis Date    Altered bowel elimination due to intestinal ostomy ([facility]     Anemia     Arthritis     Crohn disease ([facility]     Fatty liver     Hernia     History of DVT (deep vein thrombosis) 6/10/2015    History of pulmonary embolus (PE) 6/10/2015    Hyperlipemia     Hypertension     Hypothyroidism     Intervertebral lumbar disc disorder with myelopathy, lumbar region     Leukocytosis     Migraine variant with headache 1/26/2022    Movement disorder     MRSA infection     UTI    Obesity     Osteoarthritis of both knees     Palpitations     PE (pulmonary embolism)     Rash     fine skin rash not itchy gastro dr aware    Syncope     Thyroid disease     Uterine fibroid     Vitamin D deficiency      Family History   Problem Relation Age of Onset    Hypertension Mother       Past Surgical History:   Procedure Laterality Date    ABDOMEN SURGERY  11/2/14    I&d abdominal wound    CARPAL TUNNEL RELEASE      CHOLECYSTECTOMY      COLECTOMY      ostomy    COLECTOMY      COLONOSCOPY      ENDOMETRIAL ABLATION      GASTROSTOMY TUBE PLACEMENT      HERNIA REPAIR      ILEOSTOMY OR JEJUNOSTOMY      placed 2/2015 reversed 9/2015    JOINT REPLACEMENT  11/2013    both knees    TONSILLECTOMY      TOTAL KNEE ARTHROPLASTY Bilateral 2013    TUBAL LIGATION      TUNNELED VENOUS CATHETER PLACEMENT        Vitals:    03/18/22 0809   BP: (!) 178/92   Pulse: 73   Temp: 98.2 °F (36.8 °C)   SpO2: 96%   Weight: 211 lb (95.7 kg)   Height: 5' 2\" (1.575 m)       Objective:    Physical Exam  Vitals reviewed. Constitutional:       Appearance: She is well-developed. HENT:      Head: Normocephalic. Eyes:      Pupils: Pupils are equal, round, and reactive to light. Cardiovascular:      Rate and Rhythm: Normal rate and regular rhythm. Pulmonary:      Effort: Pulmonary effort is normal.      Breath sounds: Normal breath sounds. Abdominal:      Palpations: Abdomen is soft. Musculoskeletal:         General: Normal range of motion. Cervical back: Normal range of motion. Skin:     General: Skin is warm. Neurological:      Mental Status: She is alert and oriented to person, place, and time. Psychiatric:         Behavior: Behavior normal.         Natasha Smith was seen today for hypertension. Diagnoses and all orders for this visit:    Essential hypertension    Crohn's disease of colon without complication ([address]    Anxiety    Acquired hypothyroidism    Migraine variant with headache    Tension vascular headache    Uncontrolled hypertension        Comments: Continue Coreg to twice daily call us with blood pressures in 3 days. Await ultrasound of the kidneys and renal arteries stenosis evaluation. I see her back if her blood pressure improved we will schedule nephrology appointment. Beds renewed with the bottles and prescription resent. She may need me to fill her Plaquenil since she does not see the new rheumatologist for couple weeks    I educated the patient about all medications. Make sure they were correct and educated  on the risk associated with missing meds or taking them incorrectly. A list of medications is being sent home with patient today. Check blood pressure at home twice a day. Low-salt low caffeine diet.   Call if systolic blood pressure is above 761 and diastolic blood pressures above 85. Only use a upper arm digital cuff. I informed patient about the risk associated with noncompliance of medication and taking medications incorrectly. Appropriate follow-up with myself and all specialist.  Encourage family members to take active role in assisting with medications and medical care. If any confusion should develop to notify my office immediately to avoid risk of worsening medical condition      A great deal of time spent reviewing medications, diet, exercise, social issues. Also reviewing the chart before entering the room with patient and finishing charting after leaving patient's room. More than half of that time was spent face to face with the patient in counseling and coordinating care. Follow Up: Return in about 2 weeks (around 4/1/2022).      Seen by:  Keira Casey, DO

## 2022-03-19 RX ORDER — LEVOTHYROXINE SODIUM 0.12 MG/1
125 TABLET ORAL DAILY
Qty: 90 TABLET | Refills: 3 | Status: CANCELLED | OUTPATIENT
Start: 2022-03-19

## 2022-03-21 ENCOUNTER — HOSPITAL ENCOUNTER (OUTPATIENT)
Dept: ULTRASOUND IMAGING | Age: 62
Discharge: HOME | End: 2022-03-23
Payer: COMMERCIAL

## 2022-03-21 ENCOUNTER — HOSPITAL ENCOUNTER (OUTPATIENT)
Age: 62
Discharge: HOME | End: 2022-03-21
Payer: COMMERCIAL

## 2022-03-21 DIAGNOSIS — I10 UNCONTROLLED HYPERTENSION: ICD-10-CM

## 2022-03-21 DIAGNOSIS — K50.10 CROHN'S DISEASE OF COLON WITHOUT COMPLICATION (HCC): Chronic | ICD-10-CM

## 2022-03-21 DIAGNOSIS — E11.9 DIET-CONTROLLED DIABETES MELLITUS (HCC): ICD-10-CM

## 2022-03-21 DIAGNOSIS — M81.0 AGE-RELATED OSTEOPOROSIS WITHOUT CURRENT PATHOLOGICAL FRACTURE: ICD-10-CM

## 2022-03-21 DIAGNOSIS — M79.10 MYALGIA: ICD-10-CM

## 2022-03-21 DIAGNOSIS — E03.9 ACQUIRED HYPOTHYROIDISM: Chronic | ICD-10-CM

## 2022-03-21 DIAGNOSIS — D50.8 OTHER IRON DEFICIENCY ANEMIA: ICD-10-CM

## 2022-03-21 DIAGNOSIS — I10 ESSENTIAL HYPERTENSION: Chronic | ICD-10-CM

## 2022-03-21 DIAGNOSIS — D51.8 VITAMIN B12 DEFICIENCY (DIETARY) ANEMIA: ICD-10-CM

## 2022-03-21 LAB
ALBUMIN SERPL-MCNC: 4.2 G/DL (ref 3.5–5.2)
ALP BLD-CCNC: 62 U/L (ref 35–104)
ALT SERPL-CCNC: 11 U/L (ref 0–32)
ANION GAP SERPL CALCULATED.3IONS-SCNC: 9 MMOL/L (ref 7–16)
ANISOCYTOSIS: ABNORMAL
AST SERPL-CCNC: 15 U/L (ref 0–31)
BASOPHILS ABSOLUTE: 0.11 E9/L (ref 0–0.2)
BASOPHILS RELATIVE PERCENT: 1.1 % (ref 0–2)
BILIRUB SERPL-MCNC: 0.4 MG/DL (ref 0–1.2)
BUN BLDV-MCNC: 13 MG/DL (ref 6–20)
CALCIUM SERPL-MCNC: 10.8 MG/DL (ref 8.6–10.2)
CHLORIDE BLD-SCNC: 105 MMOL/L (ref 98–107)
CHOLESTEROL, TOTAL: 174 MG/DL (ref 0–199)
CO2: 24 MMOL/L (ref 22–29)
CREAT SERPL-MCNC: 0.9 MG/DL (ref 0.5–1)
EOSINOPHILS ABSOLUTE: 0.42 E9/L (ref 0.05–0.5)
EOSINOPHILS RELATIVE PERCENT: 4.2 % (ref 0–6)
GFR AFRICAN AMERICAN: >60
GFR NON-AFRICAN AMERICAN: >60 ML/MIN/1.73
GLUCOSE BLD-MCNC: 96 MG/DL (ref 74–99)
HBA1C MFR BLD: 5.1 % (ref 4–5.6)
HCT VFR BLD CALC: 39 % (ref 34–48)
HDLC SERPL-MCNC: 54 MG/DL
HEMOGLOBIN: 12 G/DL (ref 11.5–15.5)
HYPOCHROMIA: ABNORMAL
IMMATURE GRANULOCYTES #: 0.06 E9/L
IMMATURE GRANULOCYTES %: 0.6 % (ref 0–5)
IRON: 41 MCG/DL (ref 37–145)
LDL CHOLESTEROL CALCULATED: 96 MG/DL (ref 0–99)
LYMPHOCYTES ABSOLUTE: 2.39 E9/L (ref 1.5–4)
LYMPHOCYTES RELATIVE PERCENT: 23.9 % (ref 20–42)
MCH RBC QN AUTO: 25.6 PG (ref 26–35)
MCHC RBC AUTO-ENTMCNC: 30.8 % (ref 32–34.5)
MCV RBC AUTO: 83.2 FL (ref 80–99.9)
MONOCYTES ABSOLUTE: 0.97 E9/L (ref 0.1–0.95)
MONOCYTES RELATIVE PERCENT: 9.7 % (ref 2–12)
NEUTROPHILS ABSOLUTE: 6.05 E9/L (ref 1.8–7.3)
NEUTROPHILS RELATIVE PERCENT: 60.5 % (ref 43–80)
OVALOCYTES: ABNORMAL
PDW BLD-RTO: 22.1 FL (ref 11.5–15)
PLATELET # BLD: 374 E9/L (ref 130–450)
PMV BLD AUTO: 9.8 FL (ref 7–12)
POIKILOCYTES: ABNORMAL
POTASSIUM SERPL-SCNC: 3.9 MMOL/L (ref 3.5–5)
RBC # BLD: 4.69 E12/L (ref 3.5–5.5)
REASON FOR REJECTION: NORMAL
REJECTED TEST: NORMAL
RHEUMATOID FACTOR: <10 IU/ML (ref 0–13)
SEDIMENTATION RATE, ERYTHROCYTE: 22 MM/HR (ref 0–20)
SODIUM BLD-SCNC: 138 MMOL/L (ref 132–146)
T4 TOTAL: 10.9 MCG/DL (ref 4.5–11.7)
TOTAL PROTEIN: 7.9 G/DL (ref 6.4–8.3)
TRIGL SERPL-MCNC: 118 MG/DL (ref 0–149)
TSH SERPL DL<=0.05 MIU/L-ACNC: 2.79 UIU/ML (ref 0.27–4.2)
VITAMIN B-12: 398 PG/ML (ref 211–946)
VITAMIN D 25-HYDROXY: 35 NG/ML (ref 30–100)
VLDLC SERPL CALC-MCNC: 24 MG/DL
WBC # BLD: 10 E9/L (ref 4.5–11.5)

## 2022-03-21 PROCEDURE — 80053 COMPREHEN METABOLIC PANEL: CPT

## 2022-03-21 PROCEDURE — 76770 US EXAM ABDO BACK WALL COMP: CPT

## 2022-03-21 PROCEDURE — 83036 HEMOGLOBIN GLYCOSYLATED A1C: CPT

## 2022-03-21 PROCEDURE — 36415 COLL VENOUS BLD VENIPUNCTURE: CPT

## 2022-03-21 PROCEDURE — 85025 COMPLETE CBC W/AUTO DIFF WBC: CPT

## 2022-03-21 PROCEDURE — 82306 VITAMIN D 25 HYDROXY: CPT

## 2022-03-21 PROCEDURE — 86431 RHEUMATOID FACTOR QUANT: CPT

## 2022-03-21 PROCEDURE — 86038 ANTINUCLEAR ANTIBODIES: CPT

## 2022-03-21 PROCEDURE — 84436 ASSAY OF TOTAL THYROXINE: CPT

## 2022-03-21 PROCEDURE — 84443 ASSAY THYROID STIM HORMONE: CPT

## 2022-03-21 PROCEDURE — 85651 RBC SED RATE NONAUTOMATED: CPT

## 2022-03-21 PROCEDURE — 83540 ASSAY OF IRON: CPT

## 2022-03-21 PROCEDURE — 93975 VASCULAR STUDY: CPT

## 2022-03-21 PROCEDURE — 82607 VITAMIN B-12: CPT

## 2022-03-21 PROCEDURE — 80061 LIPID PANEL: CPT

## 2022-03-22 LAB — ANTI-NUCLEAR ANTIBODY (ANA): NEGATIVE

## 2022-03-23 ENCOUNTER — TELEPHONE (OUTPATIENT)
Dept: VASCULAR SURGERY | Age: 62
End: 2022-03-23

## 2022-03-23 NOTE — TELEPHONE ENCOUNTER
Called to confirm appointment for 3/24/22 at 9 a.m, left message with date, time, address, and phone number for patient.

## 2022-03-24 ENCOUNTER — OFFICE VISIT (OUTPATIENT)
Dept: VASCULAR SURGERY | Age: 62
End: 2022-03-24
Payer: COMMERCIAL

## 2022-03-24 VITALS — WEIGHT: 210 LBS | BODY MASS INDEX: 38.64 KG/M2 | HEIGHT: 62 IN

## 2022-03-24 DIAGNOSIS — R93.89 ABNORMAL CT OF THE CHEST: ICD-10-CM

## 2022-03-24 DIAGNOSIS — Z86.718 HISTORY OF DVT (DEEP VEIN THROMBOSIS): Primary | Chronic | ICD-10-CM

## 2022-03-24 DIAGNOSIS — Q27.8 ABERRANT RIGHT SUBCLAVIAN ARTERY: ICD-10-CM

## 2022-03-24 DIAGNOSIS — Z86.711 HISTORY OF PULMONARY EMBOLUS (PE): Chronic | ICD-10-CM

## 2022-03-24 PROCEDURE — 3017F COLORECTAL CA SCREEN DOC REV: CPT | Performed by: SURGERY

## 2022-03-24 PROCEDURE — G8417 CALC BMI ABV UP PARAM F/U: HCPCS | Performed by: SURGERY

## 2022-03-24 PROCEDURE — 1036F TOBACCO NON-USER: CPT | Performed by: SURGERY

## 2022-03-24 PROCEDURE — G8427 DOCREV CUR MEDS BY ELIG CLIN: HCPCS | Performed by: SURGERY

## 2022-03-24 PROCEDURE — 99204 OFFICE O/P NEW MOD 45 MIN: CPT | Performed by: SURGERY

## 2022-03-24 PROCEDURE — G8482 FLU IMMUNIZE ORDER/ADMIN: HCPCS | Performed by: SURGERY

## 2022-03-24 RX ORDER — MULTIVITAMIN WITH IRON
250 TABLET ORAL DAILY
COMMUNITY

## 2022-03-24 RX ORDER — ZINC GLUCONATE 50 MG
50 TABLET ORAL DAILY
COMMUNITY

## 2022-03-24 NOTE — PROGRESS NOTES
Chief Complaint:   Chief Complaint   Patient presents with    Consultation     new pt. subclavian stenosis         HPI: Patient came to the office, patient is anxious with extensive abdominal surgery, including, for a very large incisional hernia, at one time was told, that she has anomalous subclavian artery, congenital, was concerned about it to see if that will interfere with her abdominal surgery and future surgeries and came to the office to discuss further    Patient, underwent CTA of the chest in 2015, at that time was reported to reveal left aortic arch with aberrant right subclavian artery but patient does not have any cervical compression denies any difficulty swallowing    Many years ago, patient is small pulmonary embolus in the postoperative.   When she underwent surgery at the [facility] with history of DVT but no other problems since that time      Patient denies any focal lateralizing neurological symptoms like loss of speech, vision or loss of function of extremity    Patient can walk a few blocks , and denies any symptoms of rest pain    Allergies   Allergen Reactions    Amoxicillin-Pot Clavulanate Rash    Biaxin [Clarithromycin] Hives and Rash    Cefaclor Hives and Rash     rash    Clavulanic Acid Rash    Doxycycline Rash    Macrobid [Nitrofurantoin] Nausea And Vomiting       Current Outpatient Medications   Medication Sig Dispense Refill    zinc gluconate 50 MG tablet Take 50 mg by mouth daily      magnesium (MAGNESIUM-OXIDE) 250 MG TABS tablet Take 250 mg by mouth daily      traZODone (DESYREL) 50 MG tablet Take 2 tablets by mouth nightly 180 tablet 3    metoprolol tartrate (LOPRESSOR) 25 MG tablet Take 1 tablet by mouth 2 times daily 180 tablet 3    potassium chloride (KLOR-CON M) 20 MEQ extended release tablet Take 1 tablet by mouth 2 times daily 180 tablet 3    losartan (COZAAR) 100 MG tablet Take 1 tablet by mouth daily 90 tablet 3    carvedilol (COREG) 3.125 MG tablet One bid for 7  Days then two bid 120 tablet 3    ascorbic acid (VITAMIN C) 100 MG tablet       Biotin 5 MG CAPS Take 5 mg by mouth daily      L-Lysine 1000 MG TABS Take 1,000 mg by mouth daily      methotrexate Sodium (RHEUMATREX) 50 MG/2ML chemo injection INJECT 1 ML (25 MG) SUBCUTANEOUSLY ONCE A WEEK      omeprazole (PRILOSEC) 20 MG delayed release capsule Take 20 mg by mouth daily      thiamine 100 MG tablet       STELARA 90 MG/ML SOSY prefilled syringe       topiramate (TOPAMAX) 25 MG tablet Take 1 tablet by mouth 2 times daily In 2 wks, may increase to 2 twice daily 124 tablet 5    levothyroxine (SYNTHROID) 125 MCG tablet Take 1 tablet by mouth daily 90 tablet 3    DULoxetine (CYMBALTA) 30 MG extended release capsule Take 30 mg by mouth daily      sulconazole (EXELDERM) 1 % cream Apply topically bid 1 Tube 12    oxiconazole nitrate (OXISTAT) 1 % CREA cream Bid to breast fold 1 Tube 12    inFLIXimab (REMICADE) 100 MG injection Infuse 800 mg intravenously See Admin Instructions Indications: 800 MG every 5 weeks Over 2 hours every 8 weeks       Methotrexate, Anti-Rheumatic, (METHOTREXATE, PF, SC) Inject 1 mL into the skin once a week      Cholecalciferol (VITAMIN D3) 2000 UNITS CAPS Take 3,000 Int'l Units by mouth daily. No current facility-administered medications for this visit.        Past Medical History:   Diagnosis Date    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    Altered bowel elimination due to intestinal ostomy (HCC)     Anemia     Arthritis     Crohn disease ([facility] [address]     Fatty liver     Hernia     History of DVT (deep vein thrombosis) 6/10/2015    History of pulmonary embolus (PE) 6/10/2015    Hyperlipemia     Hypertension     Hypothyroidism     Intervertebral lumbar disc disorder with myelopathy, lumbar region     Leukocytosis     Migraine variant with headache 1/26/2022    Movement disorder     MRSA infection     UTI    Obesity     Osteoarthritis of both knees     Palpitations     PE (pulmonary embolism)     Rash     fine skin rash not itchy gastro dr aware    Subclavian vein stenosis     Syncope     Thyroid disease     Uterine fibroid     Vitamin D deficiency        Past Surgical History:   Procedure Laterality Date    ABDOMEN SURGERY  11/2/14    I&d abdominal wound    CARPAL TUNNEL RELEASE      CHOLECYSTECTOMY      COLECTOMY      ostomy    COLECTOMY      COLONOSCOPY      ENDOMETRIAL ABLATION      GASTROSTOMY TUBE PLACEMENT      HERNIA REPAIR      ILEOSTOMY OR JEJUNOSTOMY      placed 2/2015 reversed 9/2015    JOINT REPLACEMENT  11/2013    both knees    TONSILLECTOMY      TOTAL KNEE ARTHROPLASTY Bilateral 2013    TUBAL LIGATION      TUNNELED VENOUS CATHETER PLACEMENT         Family History   Problem Relation Age of Onset    Hypertension Mother        Social History     Socioeconomic History    Marital status:      Spouse name: Not on file    Number of children: Not on file    Years of education: Not on file    Highest education level: Not on file   Occupational History     Employer: [employer]   Tobacco Use    Smoking status: Never Smoker    Smokeless tobacco: Never Used   Vaping Use    Vaping Use: Never used   Substance and Sexual Activity    Alcohol use: No    Drug use: No    Sexual activity: Not on file   Other Topics Concern    Not on file   Social History Narrative        Tetanus Immunization - (8/14/2017)    HYPOTHYROID    HTN    ELEV LFT    FATTY LIVER    S/P UMBILICAL HERNIA OR AND SUBSEQUEST INCISIONAL HERNIA OR 8-06    LEUKOCYTOSIS JEMMA    CTS NO OR    OA KNEES SYNVISC----DR ODONNELL    ALLERGY TO DISSOLVABLE SUTURES    UTERINE FIBROID    ----[facility]    --3    [employer]    OBESITY    UTERINE ABLATION    LOW VIT D 7-10    ECHO 7 -10 STAGE ONE SYED DYSFX    PALP---DR TERRY    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     5-12----DIV 5-13    COLON 1-13 WITH YURICH---TOLD ULCERATIVE COLITIS--NO HELP WITH ASACOL AND BX NEG    DC WTIH INFL BOWEL DIS (CROHNS) AND JOINT SWELLING---REGULE IV STEROIDS---DC ON    PRED AND ANEMIA    STARTED HUMIRA 5-13    BILATERAL TOTAL KNEE DR ODONNELL 11-13    ADMIT 3-14 WITH FLARE OF CROHNS DIS    ADMIT WITH BRONCHITIS 4-14    FLARE CROHNS DIS    COLONOSCOPY 10-14 [facility]----ACTIVE CROHNS COLITIS IN SIGMOID    ADMIT 11-14 WITH VENTRAL WALL ABSCESS AND POSS FISTULA    SYNCOPE 11-14 WITH LACERATION SCALP    OR [facility] 2-11-15---- FOR PARTIAL BOWEL RESECTION--- PEG TUBE IN----DUE TO INFECTED    MESH    admit 5-15 with UTI SEPSIS    ILEOSTOMY REVERSAL 9-15 [facility]    MRSA UTI 10-15    START REMICADE 12-15    eval dr Padgett Just  3-19 with shots back   Mri with 5 herniated disc    Admit  11-20  With  Ventral hernia fistula  At  [facility]    Son surekha oliveros from [facility] a and   for phd program    biochem    Son engaged     Social Determinants of Health     Financial Resource Strain:     Difficulty of Paying Living Expenses: Not on file   Food Insecurity:     Worried About Running Out of Food in the Last Year: Not on file    Richi of Food in the Last Year: Not on file   Transportation Needs:     Lack of Transportation (Medical): Not on file    Lack of Transportation (Non-Medical):  Not on file   Physical Activity:     Days of Exercise per Week: Not on file    Minutes of Exercise per Session: Not on file   Stress:     Feeling of Stress : Not on file   Social Connections:     Frequency of Communication with Friends and Family: Not on file    Frequency of Social Gatherings with Friends and Family: Not on file    Attends Mormonism Services: Not on file    Active Member of Clubs or Organizations: Not on file    Attends Club or Organization Meetings: Not on file    Marital Status: Not on file   Intimate Partner Violence:     Fear of Current or Ex-Partner: Not on file    Emotionally Abused: Not on file    Physically Abused: Not on file    Sexually Abused: Not on file   Housing Stability:     Unable to Pay for Housing in the Last Year: Not on file    Number of Places Lived in the Last Year: Not on file    Unstable Housing in the Last Year: Not on file       Review of Systems:  Skin:  No abnormal pigmentation or rash  Eyes:  No blurring, diplopia or vision loss  Ears/Nose/Throat:  No hearing loss or vertigo  Respiratory:  No cough, pleuritic chest pain, dyspnea, or wheezing. Cardiovascular: No angina, palpitations . Hypertension  Gastrointestinal:  No nausea or vomiting; no abdominal pain or rectal bleeding Crohn's colitis  Musculoskeletal:  No arthritis or weakness. Neurologic:  No paralysis, paresis, paresthesia, seizures or headaches  Hematologic/Lymphatic/Immunologic:  No anemia, abnormal bleeding/bruising, fever, chills or night sweats. Endocrine:  No heat or cold intolerance. No polyphagia, polydipsia or polyuria. Hypothyroidism    Physical Exam:  General appearance:  Alert, awake, oriented x 3. No distress. Skin:  Warm and dry  Head:  Normocephalic. No masses, lesions or tenderness  Eyes:  Conjunctivae appear normal; PERRL  Ears:  External ears normal  Nose/Sinuses:  Septum midline, mucosa normal; no drainage  Oropharynx:  Clear, no exudate noted  Neck:  No jugular venous distention, lymphadenopathy or thyromegaly. No evidence of carotid bruit  Lungs:  Clear to ausculation bilaterally. No rhonchi, crackles, wheezes  Heart:  Regular rate and rhythm. No rub or murmur  Abdomen:  Soft, non-tender. No masses, organomegaly. Large incisional hernia noted  Musculoskeletal : No joint effusions, tenderness swelling    Neuro: Speech is intact. Moving all extremities. No focal motor or sensory deficits      Extremities:  Both feet are warm to touch.  The color of both feet is normal.        Pulses Right  Left    Brachial 3 3    Radial 3 3  3=normal   Femoral 2 2  2=diminished   Popliteal 1=barely palpable   Dorsalis pedis    0=absent   Posterior tibial 2 2  4=aneurysmal             Other pertinent information:1. The past medical records were reviewed. 2.  The CTA of the chest from 2015 was reviewed again, left aortic arch with aberrant right subclavian artery without any stenosis noted    Assessment:    1. History of DVT (deep vein thrombosis)    2. History of pulmonary embolus (PE)    3. Abnormal CT of the chest    4. Aberrant right subclavian artery              Plan:       Discussed the patient, informed her that have personally reviewed the CTA chest, and aberrant right subclavian artery associated left aortic arch, without any stenosis    Reassured her, informed her there is a congenital anomaly, and she has no difficulty swallowing, noisy with compression, best left alone, okay to proceed with any needed abdominal surgery from vascular perspective     All the questions were answered. Indicated follow-up: Return if symptoms worsen or fail to improve.

## 2022-03-28 ENCOUNTER — OFFICE VISIT (OUTPATIENT)
Dept: PRIMARY CARE CLINIC | Age: 62
End: 2022-03-28
Payer: COMMERCIAL

## 2022-03-28 VITALS
SYSTOLIC BLOOD PRESSURE: 162 MMHG | BODY MASS INDEX: 38.64 KG/M2 | TEMPERATURE: 96.5 F | WEIGHT: 210 LBS | HEIGHT: 62 IN | DIASTOLIC BLOOD PRESSURE: 90 MMHG

## 2022-03-28 DIAGNOSIS — E03.9 ACQUIRED HYPOTHYROIDISM: Chronic | ICD-10-CM

## 2022-03-28 DIAGNOSIS — I10 ESSENTIAL HYPERTENSION: Primary | Chronic | ICD-10-CM

## 2022-03-28 DIAGNOSIS — K50.10 CROHN'S DISEASE OF COLON WITHOUT COMPLICATION (HCC): Chronic | ICD-10-CM

## 2022-03-28 DIAGNOSIS — K63.2 ENTEROCUTANEOUS FISTULA: Chronic | ICD-10-CM

## 2022-03-28 PROCEDURE — G8482 FLU IMMUNIZE ORDER/ADMIN: HCPCS | Performed by: FAMILY MEDICINE

## 2022-03-28 PROCEDURE — 99214 OFFICE O/P EST MOD 30 MIN: CPT | Performed by: FAMILY MEDICINE

## 2022-03-28 PROCEDURE — G8417 CALC BMI ABV UP PARAM F/U: HCPCS | Performed by: FAMILY MEDICINE

## 2022-03-28 PROCEDURE — 3017F COLORECTAL CA SCREEN DOC REV: CPT | Performed by: FAMILY MEDICINE

## 2022-03-28 PROCEDURE — 1036F TOBACCO NON-USER: CPT | Performed by: FAMILY MEDICINE

## 2022-03-28 PROCEDURE — G8428 CUR MEDS NOT DOCUMENT: HCPCS | Performed by: FAMILY MEDICINE

## 2022-03-28 RX ORDER — HYDROCHLOROTHIAZIDE 25 MG/1
25 TABLET ORAL EVERY MORNING
Qty: 90 TABLET | Refills: 5 | Status: SHIPPED | OUTPATIENT
Start: 2022-03-28

## 2022-03-28 ASSESSMENT — ENCOUNTER SYMPTOMS
EYES NEGATIVE: 1
RESPIRATORY NEGATIVE: 1
DIARRHEA: 1
ALLERGIC/IMMUNOLOGIC NEGATIVE: 1

## 2022-03-28 NOTE — PROGRESS NOTES
3/28/22  Name: Priyanka James    : 1960    Sex: female    Age: 63 y.o. Subjective:  Chief Complaint: Patient is here for recheck regarding blood pressure Crohn's disease thyroid anxiety    Feels fair. No chest pain or shortness of air. Laboratory studies okay with a sed rate of 22. Thyroid function okay. Hemoglobin 12.0  bp home    Around 167-90    Her elieser  Here   172-94      Review of Systems   Constitutional: Positive for fatigue. HENT: Negative. Eyes: Negative. Respiratory: Negative. Cardiovascular: Negative. Gastrointestinal: Positive for diarrhea. Endocrine: Negative. Genitourinary: Negative. Musculoskeletal: Negative. Skin: Negative. Allergic/Immunologic: Negative. Neurological: Negative. Hematological: Negative. Psychiatric/Behavioral: Negative.           Current Outpatient Medications:     hydroCHLOROthiazide (HYDRODIURIL) 25 MG tablet, Take 1 tablet by mouth every morning, Disp: 90 tablet, Rfl: 5    zinc gluconate 50 MG tablet, Take 50 mg by mouth daily, Disp: , Rfl:     magnesium (MAGNESIUM-OXIDE) 250 MG TABS tablet, Take 250 mg by mouth daily, Disp: , Rfl:     traZODone (DESYREL) 50 MG tablet, Take 2 tablets by mouth nightly, Disp: 180 tablet, Rfl: 3    metoprolol tartrate (LOPRESSOR) 25 MG tablet, Take 1 tablet by mouth 2 times daily, Disp: 180 tablet, Rfl: 3    potassium chloride (KLOR-CON M) 20 MEQ extended release tablet, Take 1 tablet by mouth 2 times daily, Disp: 180 tablet, Rfl: 3    losartan (COZAAR) 100 MG tablet, Take 1 tablet by mouth daily, Disp: 90 tablet, Rfl: 3    ascorbic acid (VITAMIN C) 100 MG tablet, , Disp: , Rfl:     Biotin 5 MG CAPS, Take 5 mg by mouth daily, Disp: , Rfl:     L-Lysine 1000 MG TABS, Take 1,000 mg by mouth daily, Disp: , Rfl:     methotrexate Sodium (RHEUMATREX) 50 MG/2ML chemo injection, INJECT 1 ML (25 MG) SUBCUTANEOUSLY ONCE A WEEK, Disp: , Rfl:     omeprazole (PRILOSEC) 20 MG delayed release capsule, Take 20 mg by mouth daily, Disp: , Rfl:     thiamine 100 MG tablet, , Disp: , Rfl:     STELARA 90 MG/ML SOSY prefilled syringe, , Disp: , Rfl:     topiramate (TOPAMAX) 25 MG tablet, Take 1 tablet by mouth 2 times daily In 2 wks, may increase to 2 twice daily, Disp: 124 tablet, Rfl: 5    levothyroxine (SYNTHROID) 125 MCG tablet, Take 1 tablet by mouth daily, Disp: 90 tablet, Rfl: 3    DULoxetine (CYMBALTA) 30 MG extended release capsule, Take 30 mg by mouth daily, Disp: , Rfl:     sulconazole (EXELDERM) 1 % cream, Apply topically bid, Disp: 1 Tube, Rfl: 12    oxiconazole nitrate (OXISTAT) 1 % CREA cream, Bid to breast fold, Disp: 1 Tube, Rfl: 12    inFLIXimab (REMICADE) 100 MG injection, Infuse 800 mg intravenously See Admin Instructions Indications: 800 MG every 5 weeks Over 2 hours every 8 weeks , Disp: , Rfl:     Methotrexate, Anti-Rheumatic, (METHOTREXATE, PF, SC), Inject 1 mL into the skin once a week, Disp: , Rfl:     Cholecalciferol (VITAMIN D3) 2000 UNITS CAPS, Take 3,000 Int'l Units by mouth daily. , Disp: , Rfl:   Allergies   Allergen Reactions    Amoxicillin-Pot Clavulanate Rash    Biaxin [Clarithromycin] Hives and Rash    Cefaclor Hives and Rash     rash    Clavulanic Acid Rash    Doxycycline Rash    Macrobid [Nitrofurantoin] Nausea And Vomiting     Social History     Socioeconomic History    Marital status:      Spouse name: Not on file    Number of children: Not on file    Years of education: Not on file    Highest education level: Not on file   Occupational History     Employer: [employer]   Tobacco Use    Smoking status: Never Smoker    Smokeless tobacco: Never Used   Vaping Use    Vaping Use: Never used   Substance and Sexual Activity    Alcohol use: No    Drug use: No    Sexual activity: Not on file   Other Topics Concern    Not on file   Social History Narrative        Tetanus Immunization - (8/14/2017)    HYPOTHYROID    HTN    ELEV LFT FATTY LIVER    S/P UMBILICAL HERNIA OR AND SUBSEQUEST INCISIONAL HERNIA OR 8-06    LEUKOCYTOSIS JEMMA    CTS NO OR    OA KNEES SYNVISC----DR ODONNELL    ALLERGY TO DISSOLVABLE SUTURES    UTERINE FIBROID    ----[facility]P---[facility]    --3    [employer]    OBESITY    UTERINE ABLATION    LOW VIT D 7-10    ECHO 7 -10 STAGE ONE SYED DYSFX    PALP---DR TERRY    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     5-12----DIV 5-13    COLON 1-13 WITH YURICH---TOLD ULCERATIVE COLITIS--NO HELP WITH ASACOL AND BX NEG    DC WTIH INFL BOWEL DIS (CROHNS) AND JOINT SWELLING---REGULE IV STEROIDS---DC ON    PRED AND ANEMIA    STARTED HUMIRA 5-13    BILATERAL TOTAL KNEE DR ODONNELL 11-13    ADMIT 3-14 WITH FLARE OF CROHNS DIS    ADMIT WITH BRONCHITIS 4-14    FLARE CROHNS DIS    COLONOSCOPY 10-14 [facility]----ACTIVE CROHNS COLITIS IN SIGMOID    ADMIT 11-14 WITH VENTRAL WALL ABSCESS AND POSS FISTULA    SYNCOPE 11-14 WITH LACERATION SCALP    OR [facility] 2-11-15---- FOR PARTIAL BOWEL RESECTION--- PEG TUBE IN----DUE TO INFECTED    MESH    admit 5-15 with UTI SEPSIS    ILEOSTOMY REVERSAL 9-15 [facility]    MRSA UTI 10-15    START REMICADE 12-15    eval dr Gary Reynaga  3-19 with shots back   Mri with 5 herniated disc    Admit  11-20  With  Ventral hernia fistula  At  [facility]    Son surekha oliveros from [facility]  [place] and   for phd program    biochem    Son engaged    Eval vas  dr Billy Toro  re right subclavian artery and cleared and see prn     Social Determinants of Health     Financial Resource Strain:     Difficulty of Paying Living Expenses: Not on file   Food Insecurity:     Worried About Running Out of Food in the Last Year: Not on file    Richi of Food in the Last Year: Not on file   Transportation Needs:     Lack of Transportation (Medical): Not on file    Lack of Transportation (Non-Medical):  Not on file   Physical Activity:     Days of Exercise per Week: Not on file    Minutes of Exercise per Session: Not on file   Stress:     Feeling of Stress : Not on file   Social Connections:     Frequency of Communication with Friends and Family: Not on file    Frequency of Social Gatherings with Friends and Family: Not on file    Attends Rastafari Services: Not on file    Active Member of Clubs or Organizations: Not on file    Attends Club or Organization Meetings: Not on file    Marital Status: Not on file   Intimate Partner Violence:     Fear of Current or Ex-Partner: Not on file    Emotionally Abused: Not on file    Physically Abused: Not on file    Sexually Abused: Not on file   Housing Stability:     Unable to Pay for Housing in the Last Year: Not on file    Number of [place] in the Last Year: Not on file    Unstable Housing in the Last Year: Not on file      Past Medical History:   Diagnosis Date    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    Altered bowel elimination due to intestinal ostomy ([facility] [address]     Anemia     Arthritis     Crohn disease ([facility] [address]     Fatty liver     Hernia     History of DVT (deep vein thrombosis) 6/10/2015    History of pulmonary embolus (PE) 6/10/2015    Hyperlipemia     Hypertension     Hypothyroidism     Intervertebral lumbar disc disorder with myelopathy, lumbar region     Leukocytosis     Migraine variant with headache 1/26/2022    Movement disorder     MRSA infection     UTI    Obesity     Osteoarthritis of both knees     Palpitations     PE (pulmonary embolism)     Rash     fine skin rash not itchy gastro dr aware    Subclavian vein stenosis     Syncope     Thyroid disease     Uterine fibroid     Vitamin D deficiency      Family History   Problem Relation Age of Onset    Hypertension Mother       Past Surgical History:   Procedure Laterality Date    ABDOMEN SURGERY  11/2/14    I&d abdominal wound    CARPAL TUNNEL RELEASE      CHOLECYSTECTOMY      COLECTOMY      ostomy    COLECTOMY      COLONOSCOPY      ENDOMETRIAL ABLATION      GASTROSTOMY TUBE PLACEMENT      HERNIA REPAIR      ILEOSTOMY OR JEJUNOSTOMY      placed 2/2015 reversed 9/2015    JOINT REPLACEMENT  11/2013    both knees    TONSILLECTOMY      TOTAL KNEE ARTHROPLASTY Bilateral 2013    TUBAL LIGATION      TUNNELED VENOUS CATHETER PLACEMENT        Vitals:    03/28/22 0708   BP: (!) 162/90   Temp: 96.5 °F (35.8 °C)   TempSrc: Infrared   Weight: 210 lb (95.3 kg)   Height: 5' 2\" (1.575 m)       Objective:    Physical Exam  Vitals reviewed. Constitutional:       Appearance: She is well-developed. She is obese. HENT:      Head: Normocephalic. Eyes:      Pupils: Pupils are equal, round, and reactive to light. Cardiovascular:      Rate and Rhythm: Normal rate and regular rhythm. Pulmonary:      Effort: Pulmonary effort is normal.      Breath sounds: Normal breath sounds. Abdominal:      Palpations: Abdomen is soft. Hernia: A hernia is present. Musculoskeletal:         General: Normal range of motion. Cervical back: Normal range of motion. Skin:     General: Skin is warm. Neurological:      Mental Status: She is alert and oriented to person, place, and time. Psychiatric:         Behavior: Behavior normal.         Tamiko Perez was seen today for discuss labs. Diagnoses and all orders for this visit:    Essential hypertension  -     hydroCHLOROthiazide (HYDRODIURIL) 25 MG tablet; Take 1 tablet by mouth every morning  -     CBC with Auto Differential; Future  -     Comprehensive Metabolic Panel; Future  -     Magnesium; Future    [facility]  -     CBC with Auto Differential; Future  -     Comprehensive Metabolic Panel; Future  -     Magnesium; Future    Acquired hypothyroidism    Enterocutaneous fistula        Comments:   Chg to hctz    Diet exer  Ck bp   weekyl  I educated the patient about all medications.         Shayan with bp one week      Make sure they were correct and educated  on the risk associated with missing meds or taking them incorrectly. A list of medications is being sent home with patient today. Check blood pressure at home twice a day. Low-salt low caffeine diet. Call if systolic blood pressure is above 340 and diastolic blood pressures above 85. Only use a upper arm digital cuff. I informed patient about the risk associated with noncompliance of medication and taking medications incorrectly. Appropriate follow-up with myself and all specialist.  Encourage family members to take active role in assisting with medications and medical care. If any confusion should develop to notify my office immediately to avoid risk of worsening medical condition    A great deal of time spent reviewing medications, diet, exercise, social issues. Also reviewing the chart before entering the room with patient and finishing charting after leaving patient's room. More than half of that time was spent face to face with the patient in counseling and coordinating care. Follow Up: Return in about 3 months (around 6/28/2022) for Lab Before.      Seen by:  Ahsan Upton DO

## 2022-04-06 ENCOUNTER — TELEPHONE (OUTPATIENT)
Dept: PRIMARY CARE CLINIC | Age: 62
End: 2022-04-06

## 2022-05-25 ENCOUNTER — TELEPHONE (OUTPATIENT)
Dept: PRIMARY CARE CLINIC | Age: 62
End: 2022-05-25

## 2022-05-25 DIAGNOSIS — H92.03 OTALGIA OF BOTH EARS: Primary | ICD-10-CM

## 2022-05-25 NOTE — TELEPHONE ENCOUNTER
Patient calling for a referral to ENT does not want to see [facility]. She is still having the same issues. Ear is leaking, feels clogged, dizziness.

## 2022-05-26 NOTE — TELEPHONE ENCOUNTER
Notify the patient referral done. May take a few weeks.   If she is having a lot of problems see me sooner

## 2022-06-01 ENCOUNTER — OFFICE VISIT (OUTPATIENT)
Dept: PRIMARY CARE CLINIC | Age: 62
End: 2022-06-01
Payer: COMMERCIAL

## 2022-06-01 VITALS
SYSTOLIC BLOOD PRESSURE: 134 MMHG | HEART RATE: 72 BPM | HEIGHT: 62 IN | WEIGHT: 203 LBS | TEMPERATURE: 97.9 F | BODY MASS INDEX: 37.36 KG/M2 | OXYGEN SATURATION: 97 % | DIASTOLIC BLOOD PRESSURE: 82 MMHG

## 2022-06-01 DIAGNOSIS — R09.81 HEAD CONGESTION: ICD-10-CM

## 2022-06-01 DIAGNOSIS — U07.1 COVID-19: ICD-10-CM

## 2022-06-01 DIAGNOSIS — R05.9 COUGH IN ADULT: Primary | ICD-10-CM

## 2022-06-01 LAB
Lab: ABNORMAL
PERFORMING INSTRUMENT: ABNORMAL
QC PASS/FAIL: ABNORMAL
SARS-COV-2, POC: DETECTED

## 2022-06-01 PROCEDURE — G8428 CUR MEDS NOT DOCUMENT: HCPCS | Performed by: FAMILY MEDICINE

## 2022-06-01 PROCEDURE — 99213 OFFICE O/P EST LOW 20 MIN: CPT | Performed by: FAMILY MEDICINE

## 2022-06-01 PROCEDURE — G8417 CALC BMI ABV UP PARAM F/U: HCPCS | Performed by: FAMILY MEDICINE

## 2022-06-01 PROCEDURE — 1036F TOBACCO NON-USER: CPT | Performed by: FAMILY MEDICINE

## 2022-06-01 PROCEDURE — 87426 SARSCOV CORONAVIRUS AG IA: CPT | Performed by: FAMILY MEDICINE

## 2022-06-01 PROCEDURE — 3017F COLORECTAL CA SCREEN DOC REV: CPT | Performed by: FAMILY MEDICINE

## 2022-06-01 RX ORDER — PREDNISONE 10 MG/1
TABLET ORAL
Qty: 18 TABLET | Refills: 0 | Status: SHIPPED
Start: 2022-06-01 | End: 2022-08-08

## 2022-06-01 RX ORDER — AZITHROMYCIN 250 MG/1
250 TABLET, FILM COATED ORAL SEE ADMIN INSTRUCTIONS
Qty: 6 TABLET | Refills: 0 | Status: SHIPPED | OUTPATIENT
Start: 2022-06-01 | End: 2022-06-06

## 2022-06-01 ASSESSMENT — ENCOUNTER SYMPTOMS
ALLERGIC/IMMUNOLOGIC NEGATIVE: 1
EYES NEGATIVE: 1
GASTROINTESTINAL NEGATIVE: 1
RESPIRATORY NEGATIVE: 1

## 2022-06-01 ASSESSMENT — PATIENT HEALTH QUESTIONNAIRE - PHQ9
SUM OF ALL RESPONSES TO PHQ QUESTIONS 1-9: 0
SUM OF ALL RESPONSES TO PHQ QUESTIONS 1-9: 0
2. FEELING DOWN, DEPRESSED OR HOPELESS: 0
SUM OF ALL RESPONSES TO PHQ QUESTIONS 1-9: 0
SUM OF ALL RESPONSES TO PHQ9 QUESTIONS 1 & 2: 0
1. LITTLE INTEREST OR PLEASURE IN DOING THINGS: 0
SUM OF ALL RESPONSES TO PHQ QUESTIONS 1-9: 0

## 2022-06-01 NOTE — PROGRESS NOTES
22  Name: Lonnie Caballero    : 1960    Sex: female    Age: 63 y.o. Subjective:  Chief Complaint: Patient is here for bilat  Ear a dai  sinus        Sinus pressure    Cl  Chill    No tmep    Taste and smel ok      Review of Systems   Constitutional: Negative. HENT: Positive for ear pain. Eyes: Negative. Respiratory: Negative. Cardiovascular: Negative. Gastrointestinal: Negative. Endocrine: Negative. Genitourinary: Negative. Musculoskeletal: Negative. Skin: Negative. Allergic/Immunologic: Negative. Neurological: Negative. Hematological: Negative. Psychiatric/Behavioral: Negative.           Current Outpatient Medications:     predniSONE (DELTASONE) 10 MG tablet, ONE TID FOR THREE DAYS, ONE BID FOR THREE DAYS, ONE QD FOR THREE DAYS   FOOD, Disp: 18 tablet, Rfl: 0    azithromycin (ZITHROMAX) 250 MG tablet, Take 1 tablet by mouth See Admin Instructions for 5 days Ok  With biaxin allergy, Disp: 6 tablet, Rfl: 0    hydroCHLOROthiazide (HYDRODIURIL) 25 MG tablet, Take 1 tablet by mouth every morning, Disp: 90 tablet, Rfl: 5    zinc gluconate 50 MG tablet, Take 50 mg by mouth daily, Disp: , Rfl:     magnesium (MAGNESIUM-OXIDE) 250 MG TABS tablet, Take 250 mg by mouth daily, Disp: , Rfl:     traZODone (DESYREL) 50 MG tablet, Take 2 tablets by mouth nightly, Disp: 180 tablet, Rfl: 3    metoprolol tartrate (LOPRESSOR) 25 MG tablet, Take 1 tablet by mouth 2 times daily, Disp: 180 tablet, Rfl: 3    potassium chloride (KLOR-CON M) 20 MEQ extended release tablet, Take 1 tablet by mouth 2 times daily, Disp: 180 tablet, Rfl: 3    losartan (COZAAR) 100 MG tablet, Take 1 tablet by mouth daily, Disp: 90 tablet, Rfl: 3    ascorbic acid (VITAMIN C) 100 MG tablet, , Disp: , Rfl:     Biotin 5 MG CAPS, Take 5 mg by mouth daily, Disp: , Rfl:     L-Lysine 1000 MG TABS, Take 1,000 mg by mouth daily, Disp: , Rfl:     methotrexate Sodium (RHEUMATREX) 50 MG/2ML chemo injection, INJECT 1 ML (25 MG) SUBCUTANEOUSLY ONCE A WEEK, Disp: , Rfl:     omeprazole (PRILOSEC) 20 MG delayed release capsule, Take 20 mg by mouth daily, Disp: , Rfl:     thiamine 100 MG tablet, , Disp: , Rfl:     STELARA 90 MG/ML SOSY prefilled syringe, , Disp: , Rfl:     topiramate (TOPAMAX) 25 MG tablet, Take 1 tablet by mouth 2 times daily In 2 wks, may increase to 2 twice daily, Disp: 124 tablet, Rfl: 5    levothyroxine (SYNTHROID) 125 MCG tablet, Take 1 tablet by mouth daily, Disp: 90 tablet, Rfl: 3    DULoxetine (CYMBALTA) 30 MG extended release capsule, Take 30 mg by mouth daily, Disp: , Rfl:     sulconazole (EXELDERM) 1 % cream, Apply topically bid, Disp: 1 Tube, Rfl: 12    oxiconazole nitrate (OXISTAT) 1 % CREA cream, Bid to breast fold, Disp: 1 Tube, Rfl: 12    inFLIXimab (REMICADE) 100 MG injection, Infuse 800 mg intravenously See Admin Instructions Indications: 800 MG every 5 weeks Over 2 hours every 8 weeks , Disp: , Rfl:     Methotrexate, Anti-Rheumatic, (METHOTREXATE, PF, SC), Inject 1 mL into the skin once a week, Disp: , Rfl:     Cholecalciferol (VITAMIN D3) 2000 UNITS CAPS, Take 3,000 Int'l Units by mouth daily. , Disp: , Rfl:   Allergies   Allergen Reactions    Amoxicillin-Pot Clavulanate Rash    Biaxin [Clarithromycin] Hives and Rash    Cefaclor Hives and Rash     rash    Clavulanic Acid Rash    Doxycycline Rash    Macrobid [Nitrofurantoin] Nausea And Vomiting     Social History     Socioeconomic History    Marital status:      Spouse name: Not on file    Number of children: Not on file    Years of education: Not on file    Highest education level: Not on file   Occupational History     Employer: [employer]   Tobacco Use    Smoking status: Never Smoker    Smokeless tobacco: Never Used   Vaping Use    Vaping Use: Never used   Substance and Sexual Activity    Alcohol use: No    Drug use: No    Sexual activity: Not on file   Other Topics Concern    Not on file Social History Narrative        Tetanus Immunization - (8/14/2017)    HYPOTHYROID    HTN    ELEV LFT    FATTY LIVER    S/P UMBILICAL HERNIA OR AND SUBSEQUEST INCISIONAL HERNIA OR 8-06    LEUKOCYTOSIS JEMMA    CTS NO OR    OA KNEES SYNVISC----DR ODONNELL    ALLERGY TO DISSOLVABLE SUTURES    UTERINE FIBROID    [facility]    --3    WORKS [employer]    OBESITY    UTERINE ABLATION    LOW VIT D 7-10    ECHO 7 -10 STAGE ONE SYED DYSFX    PALP---DR TERRY    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     5-12----DIV 5-13    COLON 1-13 WITH YURICH---TOLD ULCERATIVE COLITIS--NO HELP WITH ASACOL AND BX NEG    DC WTIH INFL BOWEL DIS (CROHNS) AND JOINT SWELLING---REGULE IV STEROIDS---DC ON    PRED AND ANEMIA    STARTED HUMIRA 5-13    BILATERAL TOTAL KNEE DR ODONNELL 11-13    ADMIT 3-14 WITH FLARE OF CROHNS DIS    ADMIT WITH BRONCHITIS 4-14    FLARE CROHNS DIS    COLONOSCOPY 10-14 [facility]----ACTIVE CROHNS COLITIS IN SIGMOID    ADMIT 11-14 WITH VENTRAL WALL ABSCESS AND POSS FISTULA    SYNCOPE 11-14 WITH LACERATION SCALP    OR [facility] 2-11-15---- FOR PARTIAL BOWEL RESECTION--- PEG TUBE IN----DUE TO INFECTED    MESH    admit 5-15 with UTI SEPSIS    ILEOSTOMY REVERSAL 9-15 [facility]    MRSA UTI 10-15    START REMICADE 12-15    evelsa Allison Pod  3-19 with shots back   Mri with 5 herniated disc    Admit  11-20  With  Ventral hernia fistula  At  [facility]    Son surekha oliveros from [facility]  then  [employer]  for phd program    biochem    Son engaged    Eval vas  dr Mojica Console  re right subclavian artery and cleared and see prn     Social Determinants of Health     Financial Resource Strain:     Difficulty of Paying Living Expenses: Not on file   Food Insecurity:     Worried About Running Out of Food in the Last Year: Not on file    Richi of Food in the Last Year: Not on file   Transportation Needs:     Lack of Transportation (Medical):  Not on file    Lack of Transportation (Non-Medical):  Not on file   Physical Activity:     Days of Exercise per Week: Not on file    Minutes of Exercise per Session: Not on file   Stress:     Feeling of Stress : Not on file   Social Connections:     Frequency of Communication with Friends and Family: Not on file    Frequency of Social Gatherings with Friends and Family: Not on file    Attends [employer] Services: Not on file    Active Member of [address] [place] [address] or Organizations: Not on file    Attends Club or Organization Meetings: Not on file    Marital Status: Not on file   Intimate Partner Violence:     Fear of Current or Ex-Partner: Not on file    Emotionally Abused: Not on file    Physically Abused: Not on file    Sexually Abused: Not on file   Housing Stability:     Unable to Pay for Housing in the Last Year: Not on file    Number of [place] in the Last Year: Not on file    Unstable Housing in the Last Year: Not on file      Past Medical History:   Diagnosis Date    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    Altered bowel elimination due to intestinal ostomy ([facility] [address]     Anemia     Arthritis     Crohn disease ([facility] [address]     Fatty liver     Hernia     History of DVT (deep vein thrombosis) 6/10/2015    History of pulmonary embolus (PE) 6/10/2015    Hyperlipemia     Hypertension     Hypothyroidism     Intervertebral lumbar disc disorder with myelopathy, lumbar region     Leukocytosis     Migraine variant with headache 1/26/2022    Movement disorder     MRSA infection     UTI    Obesity     Osteoarthritis of both knees     Palpitations     PE (pulmonary embolism)     Rash     fine skin rash not itchy gastro dr aware    Subclavian vein stenosis     Syncope     Thyroid disease     Uterine fibroid     Vitamin D deficiency      Family History   Problem Relation Age of Onset    Hypertension Mother       Past Surgical History:   Procedure Laterality Date    ABDOMEN SURGERY  11/2/14    I&d abdominal wound    CARPAL TUNNEL RELEASE      CHOLECYSTECTOMY      COLECTOMY      ostomy    COLECTOMY      COLONOSCOPY      ENDOMETRIAL ABLATION      GASTROSTOMY TUBE PLACEMENT      HERNIA REPAIR      ILEOSTOMY OR JEJUNOSTOMY      placed 2/2015 reversed 9/2015    JOINT REPLACEMENT  11/2013    both knees    TONSILLECTOMY      TOTAL KNEE ARTHROPLASTY Bilateral 2013    TUBAL LIGATION      TUNNELED VENOUS CATHETER PLACEMENT        Vitals:    06/01/22 0958   BP: 134/82   Pulse: 72   Temp: 97.9 °F (36.6 °C)   TempSrc: Infrared   SpO2: 97%   Weight: 203 lb (92.1 kg)   Height: 5' 2\" (1.575 m)       Objective:    Physical Exam  Vitals reviewed. Constitutional:       Appearance: She is well-developed. HENT:      Head: Normocephalic. Eyes:      Pupils: Pupils are equal, round, and reactive to light. Cardiovascular:      Rate and Rhythm: Normal rate and regular rhythm. Pulmonary:      Effort: Pulmonary effort is normal.      Breath sounds: Normal breath sounds. Abdominal:      Palpations: Abdomen is soft. Musculoskeletal:         General: Normal range of motion. Cervical back: Normal range of motion. Skin:     General: Skin is warm. Neurological:      Mental Status: She is alert and oriented to person, place, and time. Psychiatric:         Behavior: Behavior normal.         Radha Parsons was seen today for cough, congestion and otalgia. Diagnoses and all orders for this visit:    Cough in adult  -     POCT COVID-19, Antigen    Head congestion  -     POCT COVID-19, Antigen    COVID-19  -     predniSONE (DELTASONE) 10 MG tablet; ONE TID FOR THREE DAYS, ONE BID FOR THREE DAYS, ONE QD FOR THREE DAYS   FOOD  -     azithromycin (ZITHROMAX) 250 MG tablet; Take 1 tablet by mouth See Admin Instructions for 5 days Ok  With biaxin allergy        Comments: covid pos  meds          Z-To prescribed. Over-the-counter zinc and vitamin C. Purchase an oximeter and call if oxygen saturation less than 90%.   If any temperature over 102 degree, shortness of breath,  chest pain go to the emergency room. Complete isolation until results are back from the Covid testing. Do not work until results are back. A great deal of time spent reviewing medications, diet, exercise, social issues. Also reviewing the chart before entering the room with patient and finishing charting after leaving patient's room. More than half of that time was spent face to face with the patient in counseling and coordinating care. I educated the patient about all medications. Make sure they were correct and educated  on the risk associated with missing meds or taking them incorrectly. A list of medications is being sent home with patient today. Check blood pressure at home twice a day. Low-salt low caffeine diet. Call if systolic blood pressure is above 726 and diastolic blood pressures above 85. Only use a upper arm digital cuff. I informed patient about the risk associated with noncompliance of medication and taking medications incorrectly. Appropriate follow-up with myself and all specialist.  Encourage family members to take active role in assisting with medications and medical care. If any confusion should develop to notify my office immediately to avoid risk of worsening medical condition    A great deal of time spent reviewing medications, diet, exercise, social issues. Also reviewing the chart before entering the room with patient and finishing charting after leaving patient's room. More than half of that time was spent face to face with the patient in counseling and coordinating care. Follow Up: Return for rtw monday, Reg Appt.      Seen by:  Georgette Cyr DO

## 2022-06-27 ENCOUNTER — OFFICE VISIT (OUTPATIENT)
Dept: PRIMARY CARE CLINIC | Age: 62
End: 2022-06-27
Payer: COMMERCIAL

## 2022-06-27 VITALS
TEMPERATURE: 97.9 F | SYSTOLIC BLOOD PRESSURE: 135 MMHG | WEIGHT: 210 LBS | DIASTOLIC BLOOD PRESSURE: 83 MMHG | BODY MASS INDEX: 38.41 KG/M2

## 2022-06-27 DIAGNOSIS — H61.23 BILATERAL IMPACTED CERUMEN: ICD-10-CM

## 2022-06-27 DIAGNOSIS — M19.012 PRIMARY OSTEOARTHRITIS OF LEFT SHOULDER: ICD-10-CM

## 2022-06-27 DIAGNOSIS — I10 ESSENTIAL HYPERTENSION: Primary | Chronic | ICD-10-CM

## 2022-06-27 DIAGNOSIS — Z00.01 ENCOUNTER FOR ANNUAL GENERAL MEDICAL EXAMINATION WITH ABNORMAL FINDINGS IN ADULT: ICD-10-CM

## 2022-06-27 DIAGNOSIS — M81.0 AGE-RELATED OSTEOPOROSIS WITHOUT CURRENT PATHOLOGICAL FRACTURE: ICD-10-CM

## 2022-06-27 DIAGNOSIS — E03.9 ACQUIRED HYPOTHYROIDISM: Chronic | ICD-10-CM

## 2022-06-27 DIAGNOSIS — K50.10 CROHN'S DISEASE OF COLON WITHOUT COMPLICATION (HCC): Chronic | ICD-10-CM

## 2022-06-27 DIAGNOSIS — K43.9 VENTRAL HERNIA WITHOUT OBSTRUCTION OR GANGRENE: Chronic | ICD-10-CM

## 2022-06-27 DIAGNOSIS — E11.9 DIET-CONTROLLED DIABETES MELLITUS (HCC): ICD-10-CM

## 2022-06-27 PROCEDURE — 3017F COLORECTAL CA SCREEN DOC REV: CPT | Performed by: FAMILY MEDICINE

## 2022-06-27 PROCEDURE — 1036F TOBACCO NON-USER: CPT | Performed by: FAMILY MEDICINE

## 2022-06-27 PROCEDURE — 2022F DILAT RTA XM EVC RTNOPTHY: CPT | Performed by: FAMILY MEDICINE

## 2022-06-27 PROCEDURE — 99214 OFFICE O/P EST MOD 30 MIN: CPT | Performed by: FAMILY MEDICINE

## 2022-06-27 PROCEDURE — G8417 CALC BMI ABV UP PARAM F/U: HCPCS | Performed by: FAMILY MEDICINE

## 2022-06-27 PROCEDURE — G8428 CUR MEDS NOT DOCUMENT: HCPCS | Performed by: FAMILY MEDICINE

## 2022-06-27 PROCEDURE — 3044F HG A1C LEVEL LT 7.0%: CPT | Performed by: FAMILY MEDICINE

## 2022-06-27 ASSESSMENT — ENCOUNTER SYMPTOMS
ALLERGIC/IMMUNOLOGIC NEGATIVE: 1
GASTROINTESTINAL NEGATIVE: 1
RESPIRATORY NEGATIVE: 1
EYES NEGATIVE: 1

## 2022-06-27 NOTE — PROGRESS NOTES
CC  Chief Complaint   Patient presents with   • Diabetes     see progress note- as of 10/2016  pt stopped metformin and started on Januvia and glipizide    • Hypertension     10/4/16 Stoped  lisinopril and start hydrochlorithiazide.   • Lipids     pt had fasting labs prior       HPI    Autumn  is a 46 year old male here for a Chronic medical follow up     I last saw him in October regarding his diabetes. At that point his a1c was 9.8 and he was having a hard time with the metformin as it upset his stocmach too much and he was mostly not taking it.  He switched to januvia and glipizide, but his sugars remain high. He had taken an insulin regimen several years ago and is familiar with that.  Regarding his asthma he is hoping to get back into exercising and needs a refill as exercise is a trigger for him. He has been tolerating the hctz for his blood pressure and is not currently taking a statin. He stopped in for labs last week which were reviewed with him today. He denies any new symptoms to report.         Regarding your Diabetes and Blood Pressure:   How often are you checking your sugars?  admits to not checking recently d/t leaving machine in [place] , before that checking once daily  Avg 210-215   Are you checking your feet for sores?  Yes-nothing noted  When did you last visit the eye doctor? Within the last year  Any low blood sugar or hypoglycemia episodes?  No   Are you taking an aspirin daily?  No  Are you following a special diet?  Low simple sugar  Ambulatory blood pressures?  Not Checking  Exercise program?  just stated exercising      DM and HTN REVIEW OF SYSTEMS:    Chest pain or tightness with exertion?  No   Difficulty breathing when you lay down?  No   Wake up at night with shortness of breath?  No   Visual changes?  No   Headaches?  Yes- caffiene withdrawal - stopped drinking soda since 12/16   Numbness or tingling in hands or feet?  No   Numbness or tingling on one side of your body?   22  Name: Isabel Arroyo    : 1960    Sex: female    Age: 63 y.o. Subjective:  Chief Complaint: Patient is here for check regarding blood pressure Crohn's disease thyroid anxiety weight osteoarthritis   Ears plugged  hernia    Ears plugged an dses ent   On    No cp or sob  Did nto get my lab but [facility] done at [facility]  And told [facility]  --rheum atology   Left shoulder pain and told [facility] to see therapy  bulmaro metz    Ortho  Been going for  Over a year  She is afraid tp move on both hernias      Review of Systems   Constitutional: Negative. HENT: Positive for hearing loss. Eyes: Negative. Respiratory: Negative. Cardiovascular: Negative. Gastrointestinal: Negative. See  hpi   Endocrine: Negative. Genitourinary: Negative. Musculoskeletal: Positive for arthralgias. Skin: Negative. Allergic/Immunologic: Negative. Neurological: Negative. Hematological: Negative. Psychiatric/Behavioral: Negative.           Current Outpatient Medications:     predniSONE (DELTASONE) 10 MG tablet, ONE TID FOR THREE DAYS, ONE BID FOR THREE DAYS, ONE QD FOR THREE DAYS   FOOD, Disp: 18 tablet, Rfl: 0    hydroCHLOROthiazide (HYDRODIURIL) 25 MG tablet, Take 1 tablet by mouth every morning, Disp: 90 tablet, Rfl: 5    zinc gluconate 50 MG tablet, Take 50 mg by mouth daily, Disp: , Rfl:     magnesium (MAGNESIUM-OXIDE) 250 MG TABS tablet, Take 250 mg by mouth daily, Disp: , Rfl:     traZODone (DESYREL) 50 MG tablet, Take 2 tablets by mouth nightly, Disp: 180 tablet, Rfl: 3    metoprolol tartrate (LOPRESSOR) 25 MG tablet, Take 1 tablet by mouth 2 times daily, Disp: 180 tablet, Rfl: 3    potassium chloride (KLOR-CON M) 20 MEQ extended release tablet, Take 1 tablet by mouth 2 times daily, Disp: 180 tablet, Rfl: 3    losartan (COZAAR) 100 MG tablet, Take 1 tablet by mouth daily, Disp: 90 tablet, Rfl: 3    ascorbic acid (VITAMIN C) 100 MG tablet, , Disp: , Rfl:     Biotin 5 MG CAPS, No   Difficulties with speaking or weakness in your face?  No   Pain in legs when walking?  No   Excessive thirst or urination? No   Leg swelling? No      MEDICATIONS    Current Outpatient Prescriptions on File Prior to Visit:  hydrochlorothiazide (HYDRODIURIL) 25 MG tablet   glipiZIDE (GLUCOTROL) 5 MG tablet   ONETOUCH VERIO   Blood Glucose Monitoring Suppl (ONETOUCH VERIO IQ SYSTEM) W/DEVICE Kit   ONETOUCH DELICA LANCETS 33G Misc   sharps container   sitaGLIPtin (JANUVIA) 50 MG tablet     No current facility-administered medications on file prior to visit.     ALLERGIES  Allergies as of 01/09/2017   • (No Known Allergies)       HISTORIES    Past Medical History   Diagnosis Date   • Asthma        Past Surgical History   Procedure Laterality Date   • Knee arthroscopy w/ acl reconstruction Left      x3   • Hernia repair       groin          Social History   Substance Use Topics   • Smoking status: Never Smoker   • Smokeless tobacco: Never Used   • Alcohol use 0.0 oz/week     0 Standard drinks or equivalent per week      Comment: rare          PHYSICAL EXAM      Vitals:  Blood pressure 126/80, pulse 82, resp. rate 14, height 6' 3\" (1.905 m), weight 122.5 kg.  General:  Alert and in no acute distress, comfortable and pleasant with the exam.  Good hygiene and well nourished.  Neck:  Supple. Nontender.  No masses, lymphadenopathy, thyromegaly. No carotid bruit or JVD.  SKIN:  Good texture and turgor, no rashes or erythema  Respiratory:  Breathing is comfortable, no chest wall tenderness. Lungs are clear bilaterally without wheezing, rhonchi or rales.   Cardiovascular:  PMI non-displaced. No palpable thrills. Regular rate and rhythm. No murmurs, rubs or gallops. Normal S1 and S2. No S3 or S4. No JVD. No carotid bruits.  Good dorsalis pedis pulses bilaterally. No peripheral edema.   Gastrointestinal:  Soft. Nontender. Nondistended. Normal bowel sounds. No pulsatile or other abdominal masses. No hepatomegaly. No  Take 5 mg by mouth daily, Disp: , Rfl:     L-Lysine 1000 MG TABS, Take 1,000 mg by mouth daily, Disp: , Rfl:     methotrexate Sodium (RHEUMATREX) 50 MG/2ML chemo injection, INJECT 1 ML (25 MG) SUBCUTANEOUSLY ONCE A WEEK, Disp: , Rfl:     omeprazole (PRILOSEC) 20 MG delayed release capsule, Take 20 mg by mouth daily, Disp: , Rfl:     thiamine 100 MG tablet, , Disp: , Rfl:     STELARA 90 MG/ML SOSY prefilled syringe, , Disp: , Rfl:     topiramate (TOPAMAX) 25 MG tablet, Take 1 tablet by mouth 2 times daily In 2 wks, may increase to 2 twice daily, Disp: 124 tablet, Rfl: 5    levothyroxine (SYNTHROID) 125 MCG tablet, Take 1 tablet by mouth daily, Disp: 90 tablet, Rfl: 3    DULoxetine (CYMBALTA) 30 MG extended release capsule, Take 30 mg by mouth daily, Disp: , Rfl:     sulconazole (EXELDERM) 1 % cream, Apply topically bid, Disp: 1 Tube, Rfl: 12    oxiconazole nitrate (OXISTAT) 1 % CREA cream, Bid to breast fold, Disp: 1 Tube, Rfl: 12    inFLIXimab (REMICADE) 100 MG injection, Infuse 800 mg intravenously See Admin Instructions Indications: 800 MG every 5 weeks Over 2 hours every 8 weeks , Disp: , Rfl:     Methotrexate, Anti-Rheumatic, (METHOTREXATE, PF, SC), Inject 1 mL into the skin once a week, Disp: , Rfl:     Cholecalciferol (VITAMIN D3) 2000 UNITS CAPS, Take 3,000 Int'l Units by mouth daily. , Disp: , Rfl:   Allergies   Allergen Reactions    Amoxicillin-Pot Clavulanate Rash    Biaxin [Clarithromycin] Hives and Rash    Cefaclor Hives and Rash     rash    Clavulanic Acid Rash    Doxycycline Rash    Macrobid [Nitrofurantoin] Nausea And Vomiting     Social History     Socioeconomic History    Marital status:      Spouse name: Not on file    Number of children: Not on file    Years of education: Not on file    Highest education level: Not on file   Occupational History     Employer: [employer] de[employer]   Tobacco Use    Smoking status: Never Smoker    Smokeless tobacco: Never Used Vaping Use    Vaping Use: Never used   Substance and Sexual Activity    Alcohol use: No    Drug use: No    Sexual activity: Not on file   Other Topics Concern    Not on file   Social History Narrative        Tetanus Immunization - (8/14/2017)    HYPOTHYROID    HTN    ELEV LFT    FATTY LIVER    S/P UMBILICAL HERNIA OR AND SUBSEQUEST INCISIONAL HERNIA OR 8-06    LEUKOCYTOSIS JEMMA    CTS NO OR    OA KNEES SYNVISC----DR ODONNELL    ALLERGY TO DISSOLVABLE SUTURES    UTERINE FIBROID    ----OSP---[facility]---[facility]    --3    [employer]    OBESITY    UTERINE ABLATION    LOW VIT D 7-10    ECHO 7 -10 STAGE ONE SYED DYSFX    PALP---DR TERRY    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     5-12----DIV 5-13    COLON 1-13 WITH YURICH---TOLD ULCERATIVE COLITIS--NO HELP WITH ASACOL AND BX NEG    DC WTIH INFL BOWEL DIS (CROHNS) AND JOINT SWELLING---REGULE IV STEROIDS---DC ON    PRED AND ANEMIA    STARTED HUMIRA 5-13    BILATERAL TOTAL KNEE DR ODONNELL 11-13    ADMIT 3-14 WITH FLARE OF CROHNS DIS    ADMIT WITH BRONCHITIS 4-14    FLARE CROHNS DIS    COLONOSCOPY 10-14 [facility]----ACTIVE CROHNS COLITIS IN SIGMOID    ADMIT 11-14 WITH VENTRAL WALL ABSCESS AND POSS FISTULA    SYNCOPE 11-14 WITH LACERATION SCALP    OR [facility] 2-11-15---- FOR PARTIAL BOWEL RESECTION--- PEG TUBE IN----DUE TO INFECTED    MESH    admit 5-15 with UTI SEPSIS    ILEOSTOMY REVERSAL 9-15 [facility]    MRSA UTI 10-15    START REMICADE 12-15    sandra Malave  3-19 with shots back   Mri with 5 herniated disc    Admit  11-20  With  Ventral hernia fistula  At  [facility]    Son surekha oliveros from [facility]  then  [place]   for phd program    biochem    Son engaged    Eval vas  dr Rosales Dobbins  re right subclavian artery and cleared and see prn    Left shoulder pain  6-22  referred to  dr Carmina Sol     Social Determinants of Health     Financial Resource Strain:     Difficulty of Paying Living Expenses: Not on splenomegaly.  No abdominal hernia.   Neurologic:  Alert and oriented x 3. Gait is normal. Normal and symmetrical sensation x 4. No motor deficits in all 4 extremities. Cranial nerves II-XII intact. Symmetrical bilateral patellar  Deep tendon reflexes without clonus.  Foot: Sensation is intact to monofilament bilaterally. Inspection of feet demonstrates no pallor, cyanosis, edema, sores, or ulcerations. Pedal pulses are bounding at dorsalis pedis and posterior tibialis.      ASSESSMENT/PLAN    Autumn was seen today for diabetes, hypertension and lipids.    Diagnoses and all orders for this visit:    Elevated alkaline phosphatase level  -     CBC & Auto Differential; Future  -     Vitamin D -25 Hydroxy; Future  -     Gamma Glutyl Transferase; Future  -     US Liver / Gallbladder / Pancreas; Future    Type 2 diabetes mellitus with complication, unspecified long term insulin use status  -     Glycohemoglobin; Future  -     Lipid Panel with Reflex; Future  -     SERVICE TO DIABETIC EDUCATION  -     insulin glargine (LANTUS SOLOSTAR) 100 UNIT/ML injection; Inject 10 Units into the skin nightly.  -     Insulin Pen Needle 29G X 12.7MM Misc; Use with lantus once daily    Other hyperlipidemia  -     Comprehensive Metabolic Panel; Future  -     Lipid Panel with Reflex; Future    Benign essential hypertension  -     Comprehensive Metabolic Panel; Future    Other orders  -     albuterol 108 (90 BASE) MCG/ACT inhaler; Inhale 2 puffs into the lungs every 4 hours as needed for Wheezing.        1. DM under poor control: discussed adding basal insulin and checking fasting and PP numbers.  Recheck labs in 3 months. Also discussed hypoglycmeia awareness. Continue with the januvia and glipizide.   2. HTN: continue with the hctz.  3. Elevated ast and alk phos. Additional labs added on to determine msk versus liver source.  He will also need a liver ultrasound if this appears liver related.  He may also need additional labs inlcuding cbc,  ama etc.   4. Asthma: albuterol refilled.       Return in about 3 months (around 4/9/2017) for diabetes/ hypertension.         file   Food Insecurity:     Worried About Running Out of Food in the Last Year: Not on file    Richi of Food in the Last Year: Not on file   Transportation Needs:     Lack of Transportation (Medical): Not on file    Lack of Transportation (Non-Medical):  Not on file   Physical Activity:     Days of Exercise per Week: Not on file    Minutes of Exercise per Session: Not on file   Stress:     Feeling of Stress : Not on file   Social Connections:     Frequency of Communication with Friends and Family: Not on file    Frequency of Social Gatherings with Friends and Family: Not on file    Attends Christian Services: Not on file    Active Member of [address] [place] [address] [employer] or Organizations: Not on file    Attends Club or Organization Meetings: Not on file    Marital Status: Not on file   Intimate Partner Violence:     Fear of Current or Ex-Partner: Not on file    Emotionally Abused: Not on file    Physically Abused: Not on file    Sexually Abused: Not on file   Housing Stability:     Unable to Pay for Housing in the Last Year: Not on file    Number of [place] in the Last Year: Not on file    Unstable Housing in the Last Year: Not on file      Past Medical History:   Diagnosis Date    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    Altered bowel elimination due to intestinal ostomy ([facility]     Anemia     Arthritis     Crohn disease ([facility]     Fatty liver     Hernia     History of DVT (deep vein thrombosis) 6/10/2015    History of pulmonary embolus (PE) 6/10/2015    Hyperlipemia     Hypertension     Hypothyroidism     Intervertebral lumbar disc disorder with myelopathy, lumbar region     Leukocytosis     Migraine variant with headache 1/26/2022    Movement disorder     MRSA infection     UTI    Obesity     Osteoarthritis of both knees     Palpitations     PE (pulmonary embolism)     Rash     fine skin rash not itchy gastro dr aware    Subclavian vein stenosis     Syncope     Thyroid disease     Uterine fibroid     Vitamin D deficiency      Family History   Problem Relation Age of Onset    Hypertension Mother       Past Surgical History:   Procedure Laterality Date    ABDOMEN SURGERY  11/2/14    I&d abdominal wound    CARPAL TUNNEL RELEASE      CHOLECYSTECTOMY      COLECTOMY      ostomy    COLECTOMY      COLONOSCOPY      ENDOMETRIAL ABLATION      GASTROSTOMY TUBE PLACEMENT      HERNIA REPAIR      ILEOSTOMY OR JEJUNOSTOMY      placed 2/2015 reversed 9/2015    JOINT REPLACEMENT  11/2013    both knees    TONSILLECTOMY      TOTAL KNEE ARTHROPLASTY Bilateral 2013    TUBAL LIGATION      TUNNELED VENOUS CATHETER PLACEMENT        Vitals:    06/27/22 0707   BP: 135/83   Temp: 97.9 °F (36.6 °C)   TempSrc: Oral   Weight: 210 lb (95.3 kg)       Objective:    Physical Exam  Vitals reviewed. Constitutional:       Appearance: She is well-developed. HENT:      Head: Normocephalic. Eyes:      Pupils: Pupils are equal, round, and reactive to light. Cardiovascular:      Rate and Rhythm: Normal rate and regular rhythm. Pulmonary:      Effort: Pulmonary effort is normal.      Breath sounds: Normal breath sounds. Abdominal:      Palpations: Abdomen is soft. Hernia: A hernia (huge left and  right abd hernia) is present. Musculoskeletal:      Cervical back: Normal range of motion. Comments: Dec rom left shoulder   Skin:     General: Skin is warm. Neurological:      Mental Status: She is alert and oriented to person, place, and time. Psychiatric:         Behavior: Behavior normal.         Loman Score was seen today for diabetes.     Diagnoses and all orders for this visit:    Essential hypertension    Acquired hypothyroidism    Primary osteoarthritis of left shoulder  -     External Referral To Orthopedic Surgery    Bilateral impacted cerumen    Crohn's disease of colon without complication ([address]    Ventral hernia without obstruction or gangrene        Comments: appt ortho I advsd fu with   [facility]  Re henia  And  fuwith  GI and rheum      I educated the patient about all medications. Make sure they were correct and educated  on the risk associated with missing meds or taking them incorrectly. A list of medications is being sent home with patient today. Check blood pressure at home twice a day. Low-salt low caffeine diet. Call if systolic blood pressure is above 418 and diastolic blood pressures above 85. Only use a upper arm digital cuff. Aggressive low-fat diet. Avoid red meats, greasy fried foods, dairy products. Avoid processed foods. Take cholesterol medications without food. I informed patient about the risk associated with noncompliance of medication and taking medications incorrectly. Appropriate follow-up with myself and all specialist.  Encourage family members to take active role in assisting with medications and medical care. If any confusion should develop to notify my office immediately to avoid risk of worsening medical condition    A great deal of time spent reviewing medications, diet, exercise, social issues. Also reviewing the chart before entering the room with patient and finishing charting after leaving patient's room. More than half of that time was spent face to face with the patient in counseling and coordinating care.       Follow Up: Return in about 4 months (around 10/27/2022) for Lab Before, See Referral.     Seen by:  Larry Villegas DO

## 2022-07-11 ENCOUNTER — OFFICE VISIT (OUTPATIENT)
Dept: ENT CLINIC | Age: 62
End: 2022-07-11
Payer: COMMERCIAL

## 2022-07-11 ENCOUNTER — PROCEDURE VISIT (OUTPATIENT)
Dept: AUDIOLOGY | Age: 62
End: 2022-07-11

## 2022-07-11 VITALS
SYSTOLIC BLOOD PRESSURE: 145 MMHG | HEART RATE: 101 BPM | WEIGHT: 210 LBS | BODY MASS INDEX: 38.64 KG/M2 | DIASTOLIC BLOOD PRESSURE: 86 MMHG | HEIGHT: 62 IN

## 2022-07-11 DIAGNOSIS — H60.393 OTHER INFECTIVE OTITIS EXTERNA OF BOTH EARS, UNSPECIFIED CHRONICITY: ICD-10-CM

## 2022-07-11 DIAGNOSIS — H91.90 PERCEIVED HEARING LOSS: ICD-10-CM

## 2022-07-11 DIAGNOSIS — H92.03 OTALGIA OF BOTH EARS: Primary | ICD-10-CM

## 2022-07-11 DIAGNOSIS — H60.393 OTHER INFECTIVE OTITIS EXTERNA OF BOTH EARS, UNSPECIFIED CHRONICITY: Primary | ICD-10-CM

## 2022-07-11 PROCEDURE — 99203 OFFICE O/P NEW LOW 30 MIN: CPT | Performed by: NURSE PRACTITIONER

## 2022-07-11 PROCEDURE — G8427 DOCREV CUR MEDS BY ELIG CLIN: HCPCS | Performed by: NURSE PRACTITIONER

## 2022-07-11 PROCEDURE — 1036F TOBACCO NON-USER: CPT | Performed by: NURSE PRACTITIONER

## 2022-07-11 PROCEDURE — G8417 CALC BMI ABV UP PARAM F/U: HCPCS | Performed by: NURSE PRACTITIONER

## 2022-07-11 PROCEDURE — 3017F COLORECTAL CA SCREEN DOC REV: CPT | Performed by: NURSE PRACTITIONER

## 2022-07-11 PROCEDURE — 4130F TOPICAL PREP RX AOE: CPT | Performed by: NURSE PRACTITIONER

## 2022-07-11 RX ORDER — CIPROFLOXACIN AND DEXAMETHASONE 3; 1 MG/ML; MG/ML
4 SUSPENSION/ DROPS AURICULAR (OTIC) 2 TIMES DAILY
Qty: 1 EACH | Refills: 1 | Status: SHIPPED | OUTPATIENT
Start: 2022-07-11 | End: 2022-07-18

## 2022-07-11 ASSESSMENT — ENCOUNTER SYMPTOMS
COUGH: 0
EYE DISCHARGE: 0
DIARRHEA: 0
BACK PAIN: 0
ALLERGIC/IMMUNOLOGIC NEGATIVE: 1
RHINORRHEA: 0
EYE PAIN: 0
VOMITING: 0
SORE THROAT: 0
SINUS PRESSURE: 1
SHORTNESS OF BREATH: 0
SINUS PAIN: 0

## 2022-07-11 NOTE — PROGRESS NOTES
Subjective:      Patient ID:  Emily Vivar is a 63 y.o. female. HPI:    Patient presents today with a moderate problem of the bilateral ear. It started 1.55 months ago. Patient states that nothing makes it better and nothing makes it worse. Pain: yes   Side: bilateral  Drainage:  yes   Side: bilateral   Trauma history to the ear:    Side: bilateral   Desc:  None  Hearing Aids: no  History of surgery to the head/neck: no   Description: none  History of cerumen impaction: no  History of noise exposure: no   Type: none  Spinning: no  Hearing loss: yes    Fluctuating: no  Aural pressure: yes  Tinnitus: no  Otalgia: yes    Patient previously seen by Dr. Jackie Lundberg. Was given mometasone nasal spray. Denies symptom improvement. Reports recurrent ear infections as a child. She denies history of ear tubes. Denies recent fevers or antibiotics. Had COVID-19 about 6 weeks ago. Has tried taking Claratin and Zyrtec without symptom improvement. Each medication was used for a \"couple months\".      Past Medical History:   Diagnosis Date    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    Altered bowel elimination due to intestinal ostomy (HCC)     Anemia     Arthritis     Crohn disease (HCC)     Fatty liver     Hernia     History of DVT (deep vein thrombosis) 6/10/2015    History of pulmonary embolus (PE) 6/10/2015    Hyperlipemia     Hypertension     Hypothyroidism     Intervertebral lumbar disc disorder with myelopathy, lumbar region     Leukocytosis     Migraine variant with headache 1/26/2022    Movement disorder     MRSA infection     UTI    Obesity     Osteoarthritis of both knees     Palpitations     PE (pulmonary embolism)     Rash     fine skin rash not itchy gastro dr aware    Subclavian vein stenosis     Syncope     Thyroid disease     Uterine fibroid     Vitamin D deficiency      Past Surgical History:   Procedure Laterality Date    ABDOMEN SURGERY  11/2/14    I&d abdominal wound    CARPAL TUNNEL RELEASE      CHOLECYSTECTOMY      COLECTOMY      ostomy    COLECTOMY      COLONOSCOPY      ENDOMETRIAL ABLATION      GASTROSTOMY TUBE PLACEMENT      HERNIA REPAIR      ILEOSTOMY OR JEJUNOSTOMY      placed 2/2015 reversed 9/2015    JOINT REPLACEMENT  11/2013    both knees    TONSILLECTOMY      TOTAL KNEE ARTHROPLASTY Bilateral 2013    TUBAL LIGATION      TUNNELED VENOUS CATHETER PLACEMENT       Family History   Problem Relation Age of Onset    Hypertension Mother      Social History     Socioeconomic History    Marital status:      Spouse name: None    Number of children: None    Years of education: None    Highest education level: None   Occupational History     Employer: [employer]   Tobacco Use    Smoking status: Never Smoker    Smokeless tobacco: Never Used   Vaping Use    Vaping Use: Never used   Substance and Sexual Activity    Alcohol use: No    Drug use: No    Sexual activity: None   Other Topics Concern    None   Social History Narrative        Tetanus Immunization - (8/14/2017)    HYPOTHYROID    HTN    ELEV LFT    FATTY LIVER    S/P UMBILICAL HERNIA OR AND SUBSEQUEST INCISIONAL HERNIA OR 8-06    LEUKOCYTOSIS JEMMA    CTS NO OR    OA KNEES SYNVISC----DR ODONNELL    ALLERGY TO DISSOLVABLE SUTURES    UTERINE FIBROID    ----[facility]    --3    WORKS "[employer] DEPT    OBESITY    UTERINE ABLATION    LOW VIT D 7-10    ECHO 7 -10 STAGE ONE SYED DYSFX    PALP---DR TERRY    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     5-12----DIV 5-13    COLON 1-13 WITH YURICH---TOLD ULCERATIVE COLITIS--NO HELP WITH ASACOL AND BX NEG    DC WTIH INFL BOWEL DIS (CROHNS) AND JOINT SWELLING---REGULE IV STEROIDS---DC ON    PRED AND ANEMIA    STARTED HUMIRA 5-13    BILATERAL TOTAL KNEE DR ODONNELL 11-13    ADMIT 3-14 WITH FLARE OF CROHNS DIS    ADMIT WITH BRONCHITIS 4-14    FLARE CROHNS DIS COLONOSCOPY 10-14 [facility]----ACTIVE CROHNS COLITIS IN SIGMOID    ADMIT 11-14 WITH VENTRAL WALL ABSCESS AND POSS FISTULA    SYNCOPE 11-14 WITH LACERATION SCALP    OR [facility] 2-11-15---- FOR PARTIAL BOWEL RESECTION--- PEG TUBE IN----DUE TO INFECTED    MESH    admit 5-15 with UTI SEPSIS    ILEOSTOMY REVERSAL 9-15 [facility]    MRSA UTI 10-15    START REMICADE 12-15    eval dr Isidro Art  3-19 with shots back   Mri with 5 herniated disc    Admit  11-20  With  Ventral hernia fistula  At  [facility]    Son surekha   bs from [employer]  then  [employer]  for phd program    biochem    Son engaged    Eval vas  dr Sydney Gardner  re right subclavian artery and cleared and see prn    Left shoulder pain  6-22  referred to  dr Chetan Berry     Social Determinants of Health     Financial Resource Strain:     Difficulty of Paying Living Expenses: Not on file   Food Insecurity:     Worried About Running Out of Food in the Last Year: Not on file    Richi of Food in the Last Year: Not on file   Transportation Needs:     Lack of Transportation (Medical): Not on file    Lack of Transportation (Non-Medical):  Not on file   Physical Activity:     Days of Exercise per Week: Not on file    Minutes of Exercise per Session: Not on file   Stress:     Feeling of Stress : Not on file   Social Connections:     Frequency of Communication with Friends and Family: Not on file    Frequency of Social Gatherings with Friends and Family: Not on file    Attends Confucianist Services: Not on file    Active Member of Clubs or Organizations: Not on file    Attends Club or Organization Meetings: Not on file    Marital Status: Not on file   Intimate Partner Violence:     Fear of Current or Ex-Partner: Not on file    Emotionally Abused: Not on file    Physically Abused: Not on file    Sexually Abused: Not on file   Housing Stability:     Unable to Pay for Housing in the Last Year: Not on file    Number of [place] in the Last Year: Not on file    Unstable Housing in the Last Year: Not on file     Allergies   Allergen Reactions    Amoxicillin-Pot Clavulanate Rash    Biaxin [Clarithromycin] Hives and Rash    Cefaclor Hives and Rash     rash    Clavulanic Acid Rash    Doxycycline Rash    Macrobid [Nitrofurantoin] Nausea And Vomiting         Review of Systems   Constitutional: Negative for chills and fever. HENT: Positive for congestion, hearing loss (muffled), sinus pressure and tinnitus. Negative for postnasal drip, rhinorrhea, sinus pain, sneezing and sore throat. Eyes: Negative for pain and discharge. Respiratory: Negative for cough and shortness of breath. Cardiovascular: Negative for chest pain. Gastrointestinal: Negative for diarrhea and vomiting. Genitourinary: Negative for flank pain. Musculoskeletal: Negative for back pain and neck pain. Skin: Negative for rash. Allergic/Immunologic: Negative. Neurological: Negative for dizziness, syncope and headaches. All other systems reviewed and are negative. Objective:   Physical Exam  Vitals reviewed. Constitutional:       Appearance: Normal appearance. HENT:      Head: Normocephalic and atraumatic. Jaw: There is normal jaw occlusion. No tenderness. Right Ear: Tympanic membrane, ear canal and external ear normal.      Left Ear: Tympanic membrane, ear canal and external ear normal.      Ears:        Nose: Nose normal.      Right Turbinates: Not swollen or pale. Left Turbinates: Not swollen or pale. Mouth/Throat:      Lips: Pink. Mouth: Mucous membranes are moist.      Pharynx: Oropharynx is clear. Eyes:      General: Lids are normal.      Conjunctiva/sclera: Conjunctivae normal.      Pupils: Pupils are equal, round, and reactive to light. Cardiovascular:      Rate and Rhythm: Normal rate and regular rhythm. Pulses: Normal pulses. Pulmonary:      Effort: Pulmonary effort is normal. No respiratory distress. Breath sounds: No stridor.    Abdominal:

## 2022-07-11 NOTE — PROGRESS NOTES
This patient was scheduled for audiometric/tympanometric testing by FRANCISCO Riggins due to Hearing loss and bilateral otalgia. Otoscopic inspection showed drainage bilaterally. Testing was therefore deferred at this time. The results were reviewed with the patient. Recommendations for follow up will be made pending physician consult.     Juli Delvalle

## 2022-07-14 ENCOUNTER — TELEPHONE (OUTPATIENT)
Dept: ENT CLINIC | Age: 62
End: 2022-07-14

## 2022-07-14 DIAGNOSIS — H60.393 OTHER INFECTIVE OTITIS EXTERNA OF BOTH EARS, UNSPECIFIED CHRONICITY: Primary | ICD-10-CM

## 2022-07-14 DIAGNOSIS — Z22.322 MRSA (METHICILLIN RESISTANT STAPH AUREUS) CULTURE POSITIVE: ICD-10-CM

## 2022-07-14 LAB
CULTURE EAR OR EYE: ABNORMAL
GRAM STAIN RESULT: ABNORMAL
ORGANISM: ABNORMAL

## 2022-07-15 NOTE — TELEPHONE ENCOUNTER
Discussed culture results showing positive MRSA infection. Patient has multiple allergies including penicillins as well as doxycycline with significant contraindication to Bactrim use due to current methotrexate. Discussed with Dr. Briana Escobar with recommendation to follow-up with infectious disease for possible outpatient IV antibiotic therapy.

## 2022-07-19 ENCOUNTER — TELEPHONE (OUTPATIENT)
Dept: PRIMARY CARE CLINIC | Age: 62
End: 2022-07-19

## 2022-07-19 NOTE — TELEPHONE ENCOUNTER
Patient was seen by ENT 7/11. Culture came back as MRSA. KASSANDRA Sanches - CNP     MR    12:42 PM  Note   Discussed culture results showing positive MRSA infection. Patient has multiple allergies including penicillins as well as doxycycline with significant contraindication to Bactrim use due to current methotrexate. Discussed with Dr. Prateek Campbell with recommendation to follow-up with infectious disease for possible outpatient IV antibiotic therapy. Patient is calling to let you know what is going on. Asking what you think the next step is- wanting your opinion. Cannot go back to work yet- works at [employer].  Has not yet been called for referral.

## 2022-07-19 NOTE — TELEPHONE ENCOUNTER
[facility]  patient I think infectious disease is the way to go.   If she does not hear from them in a day or 2 let me know

## 2022-07-20 ENCOUNTER — TELEPHONE (OUTPATIENT)
Dept: ENT CLINIC | Age: 62
End: 2022-07-20

## 2022-07-20 NOTE — TELEPHONE ENCOUNTER
Please Advise    Pt left vm on [facility] line inquiring \"when she can get scheduled with Infectious disease bc she would like to get back to work as soon as possible but needs to be treated first.\"

## 2022-07-20 NOTE — TELEPHONE ENCOUNTER
Left message for infectious disease and informed that patient is being kept from work till being seen. Unable to contact office directly.  Fax referral was sent date of referral palcement 7/14

## 2022-07-21 ENCOUNTER — TELEPHONE (OUTPATIENT)
Dept: ENT CLINIC | Age: 62
End: 2022-07-21

## 2022-07-21 NOTE — TELEPHONE ENCOUNTER
Please Advise    Pt called [facility] notifying office \"she contacted infectious disease and was told they didn't receive a referral.\" Notified pt her referral was sent recently, they might need time to contact her. Pt states \"she has Crohn's disease and is burning through sick time and would like to get back to work. \" Apologized, notified pt infectious disease should be contacting her soon regarding her referral. Pt verified understanding.

## 2022-07-22 ENCOUNTER — HOSPITAL ENCOUNTER (EMERGENCY)
Age: 62
Discharge: HOME | End: 2022-07-22
Payer: COMMERCIAL

## 2022-07-22 VITALS
BODY MASS INDEX: 37.17 KG/M2 | HEIGHT: 62 IN | OXYGEN SATURATION: 100 % | HEART RATE: 71 BPM | RESPIRATION RATE: 18 BRPM | DIASTOLIC BLOOD PRESSURE: 102 MMHG | SYSTOLIC BLOOD PRESSURE: 175 MMHG | WEIGHT: 202 LBS | TEMPERATURE: 98.1 F

## 2022-07-22 DIAGNOSIS — H65.23 BILATERAL CHRONIC SEROUS OTITIS MEDIA: ICD-10-CM

## 2022-07-22 DIAGNOSIS — Z22.322 MRSA (METHICILLIN RESISTANT STAPH AUREUS) CULTURE POSITIVE: Primary | ICD-10-CM

## 2022-07-22 PROCEDURE — 99283 EMERGENCY DEPT VISIT LOW MDM: CPT

## 2022-07-22 PROCEDURE — 6370000000 HC RX 637 (ALT 250 FOR IP): Performed by: PHYSICIAN ASSISTANT

## 2022-07-22 RX ORDER — SULFAMETHOXAZOLE AND TRIMETHOPRIM 800; 160 MG/1; MG/1
1 TABLET ORAL ONCE
Status: COMPLETED | OUTPATIENT
Start: 2022-07-22 | End: 2022-07-22

## 2022-07-22 RX ADMIN — SULFAMETHOXAZOLE AND TRIMETHOPRIM 1 TABLET: 800; 160 TABLET ORAL at 13:55

## 2022-07-22 ASSESSMENT — PAIN - FUNCTIONAL ASSESSMENT: PAIN_FUNCTIONAL_ASSESSMENT: 0-10

## 2022-07-22 ASSESSMENT — PAIN DESCRIPTION - LOCATION: LOCATION: HEAD

## 2022-07-22 ASSESSMENT — PAIN SCALES - GENERAL: PAINLEVEL_OUTOF10: 6

## 2022-07-22 NOTE — Clinical Note
Baljinder Bates was seen and treated in our emergency department on 7/22/2022. She may return to work on 07/23/2022. You may return back to work full duty     If you have any questions or concerns, please don't hesitate to call.       Adelia Chu PA-C

## 2022-07-22 NOTE — ED PROVIDER NOTES
ED Attending shared visit  CC: Candace     [facility]  Department of Emergency Medicine   ED  Encounter Note  Admit Date/RoomTime: 2022  1:30 PM  ED Room: 35/  NAME: Mick Simmons  : 1960  MRN: [id number]     Chief Complaint:  Abnormal Lab (states ear cultures positive for MRSA)    HISTORY OF PRESENT ILLNESS        Mick Simmons is a 63 y.o. female who presents to the ED by private vehicle for chronic otitis media. Patient states been going on for almost the last 2 months. Patient states nothing seems to make it better or worse. Patient states she was previously seen at Dr. Carlitos Mcmullen office and was given nasal spray and states that she has been using it and has no improvement as well as Claritin. Patient had COVID about 6 weeks ago. Patient denies any history of ear tubes. Patient denies any recent antibiotic usage. Patient has no radiation of any pain. Patient states that on  she was seen at the office and cultures were taken and she was called today told to come in for further evaluation because her cultures were positive for [facility]. \"    ROS   Pertinent positives and negatives are stated within HPI, all other systems reviewed and are negative. Past Medical History:  has a past medical history of Aberrant right subclavian artery, Altered bowel elimination due to intestinal ostomy ([address] Anemia, Arthritis, Crohn disease ([address] Fatty liver, Hernia, History of DVT (deep vein thrombosis), History of pulmonary embolus (PE), Hyperlipemia, Hypertension, Hypothyroidism, Intervertebral lumbar disc disorder with myelopathy, lumbar region, Leukocytosis, Migraine variant with headache, Movement disorder, MRSA infection, Obesity, Osteoarthritis of both knees, Palpitations, PE (pulmonary embolism), Rash, Subclavian vein stenosis, Syncope, Thyroid disease, Uterine fibroid, and Vitamin D deficiency.     Surgical History:  has a past surgical history that includes Cholecystectomy; Tubal ligation; hernia repair; Endometrial ablation; Tonsillectomy; Colonoscopy; Abdomen surgery (11/2/14); Total knee arthroplasty (Bilateral, 2013); Gastrostomy tube placement; Tunneled venous catheter placement; colectomy; colectomy; joint replacement (11/2013); Jejunostomy; and Carpal tunnel release. Social History:  reports that she has never smoked. She has never used smokeless tobacco. She reports that she does not drink alcohol and does not use drugs. Family History: family history includes Hypertension in her mother. Allergies: Amoxicillin-pot clavulanate, Biaxin [clarithromycin], Cefaclor, Clavulanic acid, Doxycycline, and Macrobid [nitrofurantoin]    PHYSICAL EXAM   Oxygen Saturation Interpretation: Normal on room air analysis. ED Triage Vitals [07/22/22 1322]   BP Temp Temp Source Heart Rate Resp SpO2 Height Weight   (!) 190/101 98.1 °F (36.7 °C) Temporal 78 16 100 % 5' 2\" (1.575 m) 202 lb (91.6 kg)         Physical Exam  Constitutional/General: Alert and oriented x3, well appearing, non toxic  HEENT:  NC/NT. PERRLA,  Airway patent. Neck: Supple, full ROM, non tender to palpation in the midline, no stridor, no crepitus, no meningeal signs  Respiratory: Lungs clear to auscultation bilaterally, no wheezes, rales, or rhonchi. Not in respiratory distress  CV:  Regular rate. Regular rhythm. No murmurs, gallops, or rubs. 2+ distal pulses  Chest: No chest wall tenderness  GI:  Abdomen Soft, Non tender, Non distended. +BS. No rebound, guarding, or rigidity. No pulsatile masses. Musculoskeletal: Moves all extremities x 4. Warm and well perfused, no clubbing, cyanosis, or edema. Capillary refill <3 seconds  Integument: skin warm and dry. No rashes.    Lymphatic: no lymphadenopathy noted  Neurologic: GCS 15, no focal deficits, symmetric strength 5/5 in the upper and lower extremities bilaterally  Psychiatric: Normal Affect    Lab / Imaging Results   (All laboratory and radiology results have been personally reviewed by myself)  Labs:  No results found for this visit on 07/22/22. Imaging: All Radiology results interpreted by Radiologist unless otherwise noted. No orders to display       ED Course / Medical Decision Making     Medications   sulfamethoxazole-trimethoprim (BACTRIM DS;SEPTRA DS) 800-160 MG per tablet 1 tablet (1 tablet Oral Given 7/22/22 1355)     ED Course as of 07/22/22 1509   Fri Jul 22, 2022   1352 I reviewed the patient's chart and patient has had Bactrim in the past for separate times while being on methotrexate. Did discuss interactions with pharmacist.  Patient states that she does not want IV antibiotics if she is able to try oral.  Patient states she recently went to [facility] clinic and had testing to see if she did actually have a penicillin allergy which she does not and is capable of also taking penicillin. Patient states her only reaction is a rash. Patient states she does not want to have IV antibiotics or PICC line or be admitted for this if she is able to take oral antibiotics. First dose of Bactrim given and patient will be monitored [AM]      ED Course User Index  [AM] Nicolas Richey PA-C       Consult(s):   None    Procedure(s):   None    MDM:   Patient is a 81-year-old is presenting due to being called that she was found to have MRSA on culture of her ear. Reviewed the notes indefinitely as well spoke to the pharmacist as well as staff the case with my attending. Call was placed to ear nose and throat after examining the patient. Patient has no symptoms whatsoever and feels fine. Slight erythema noted in the ears but no evidence of active infection. Patient is extremely well-appearing and nontoxic without any fevers or chills. Patient is no evidence of mastoiditis or parotid Nestor or orbital cellulitis or any form of facial cellulitis.   Patient's medication allergies were reviewed and patient states that she has had doxycycline and Bactrim in the past while being on methotrexate does not know why she could not tolerate oral antibiotics. Patient states she would like to try oral antibiotics before ever trying IV antibiotics. Case reviewed with ENT resident and they feel that this is most likely colonization and patient can be sent home on Ciprodex eardrops. Patient did state that she already has some Ciprodex at home therefore no medication given. Patient was made aware of this and patient will be discharged with Ciprodex to follow-up with ear nose and throat as well as her family doctor. Patient voiced understanding and agreed with the plan and management. Patient is vitally stable and ready for outpatient follow-up    Patient was explicitly instructed on specific signs and symptoms on which to return to the emergency room for. Patient was instructed to return to the ER for any new or worsening symptoms. Additional discharge instructions were given verbally. All questions were answered. Patient is comfortable and agreeable with discharge plan. Patient in no acute distress and non-toxic in appearance. Plan of Care/Counseling:  Acosta Wick PA-C reviewed today's visit with the patient in addition to providing specific details for the plan of care and counseling regarding the diagnosis and prognosis. Questions are answered at this time and are agreeable with the plan. ASSESSMENT     1. MRSA (methicillin resistant staph aureus) culture positive    2. Bilateral chronic serous otitis media      PLAN   Discharged home. Patient condition is stable    New Medications     New Prescriptions    No medications on file     Electronically signed by Acosta Wick PA-C   DD: 7/22/22  **This report was transcribed using voice recognition software. Every effort was made to ensure accuracy; however, inadvertent computerized transcription errors may be present.   END OF ED PROVIDER NOTE       Acosta Wick PA-C  07/22/22 [id number]

## 2022-07-26 ENCOUNTER — OFFICE VISIT (OUTPATIENT)
Dept: ENT CLINIC | Age: 62
End: 2022-07-26
Payer: COMMERCIAL

## 2022-07-26 VITALS — WEIGHT: 202 LBS | HEIGHT: 62 IN | BODY MASS INDEX: 37.17 KG/M2

## 2022-07-26 DIAGNOSIS — Z22.322 MRSA (METHICILLIN RESISTANT STAPH AUREUS) CULTURE POSITIVE: ICD-10-CM

## 2022-07-26 DIAGNOSIS — H60.393 OTHER INFECTIVE OTITIS EXTERNA OF BOTH EARS, UNSPECIFIED CHRONICITY: Primary | ICD-10-CM

## 2022-07-26 PROCEDURE — 99213 OFFICE O/P EST LOW 20 MIN: CPT | Performed by: NURSE PRACTITIONER

## 2022-07-26 RX ORDER — AZELASTINE 1 MG/ML
1-2 SPRAY, METERED NASAL 2 TIMES DAILY PRN
Qty: 30 ML | Refills: 1 | Status: SHIPPED | OUTPATIENT
Start: 2022-07-26

## 2022-07-26 ASSESSMENT — ENCOUNTER SYMPTOMS
RHINORRHEA: 0
ALLERGIC/IMMUNOLOGIC NEGATIVE: 1
BACK PAIN: 0
SINUS PRESSURE: 1
COUGH: 0
VOMITING: 0
SINUS PAIN: 0
EYE PAIN: 0
EYE DISCHARGE: 0
SHORTNESS OF BREATH: 0
SORE THROAT: 0
DIARRHEA: 0

## 2022-07-26 NOTE — PROGRESS NOTES
[facility] Otolaryngology  Dr. Matt Chua. Katlin Walton. Ms.Ed        Patient Name:  Nancy Carmichael  :  1960     CHIEF C/O:    Chief Complaint   Patient presents with    Follow-up     MRSA ear infection. Upcoming apt with ID for IV treatment. (8/15) states that the ears are improving. State that she is having some sudden onset sinus issues. Unsure if that is good or bad. Improving in other symptoms. States that she is back to work. States that she works at the [employer] and is surrounded by germs. HISTORY OBTAINED FROM:  patient    HISTORY OF PRESENT ILLNESS:       Moncho Saldana is a 63y.o. year old female, here today for follow up of bilateral ear infections.     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    Altered bowel elimination due to intestinal ostomy ([address]     Anemia     Arthritis     Crohn disease ([address]     Fatty liver     Hernia     History of DVT (deep vein thrombosis) 6/10/2015    History of pulmonary embolus (PE) 6/10/2015    Hyperlipemia     Hypertension     Hypothyroidism     Intervertebral lumbar disc disorder with myelopathy, lumbar region     Leukocytosis     Migraine variant with headache 2022    Movement disorder     MRSA infection     UTI    Obesity     Osteoarthritis of both knees     Palpitations     PE (pulmonary embolism)     Rash     fine skin rash not itchy gastro dr aware    Subclavian vein stenosis     Syncope     Thyroid disease     Uterine fibroid     Vitamin D deficiency      Past Surgical History:   Procedure Laterality Date    ABDOMEN SURGERY  14    I&d abdominal wound    CARPAL TUNNEL RELEASE      CHOLECYSTECTOMY      COLECTOMY      ostomy    COLECTOMY      COLONOSCOPY      ENDOMETRIAL ABLATION      GASTROSTOMY TUBE PLACEMENT      HERNIA REPAIR      ILEOSTOMY OR JEJUNOSTOMY      placed 2/2015 reversed 9/2015    JOINT REPLACEMENT  11/2013    both knees    TONSILLECTOMY      TOTAL KNEE ARTHROPLASTY Bilateral 2013    TUBAL LIGATION      TUNNELED VENOUS CATHETER PLACEMENT         Current Outpatient Medications:     predniSONE (DELTASONE) 10 MG tablet, ONE TID FOR THREE DAYS, ONE BID FOR THREE DAYS, ONE QD FOR THREE DAYS   FOOD, Disp: 18 tablet, Rfl: 0    hydroCHLOROthiazide (HYDRODIURIL) 25 MG tablet, Take 1 tablet by mouth every morning, Disp: 90 tablet, Rfl: 5    zinc gluconate 50 MG tablet, Take 50 mg by mouth daily, Disp: , Rfl:     magnesium (MAGNESIUM-OXIDE) 250 MG TABS tablet, Take 250 mg by mouth daily, Disp: , Rfl:     traZODone (DESYREL) 50 MG tablet, Take 2 tablets by mouth nightly, Disp: 180 tablet, Rfl: 3    metoprolol tartrate (LOPRESSOR) 25 MG tablet, Take 1 tablet by mouth 2 times daily, Disp: 180 tablet, Rfl: 3    potassium chloride (KLOR-CON M) 20 MEQ extended release tablet, Take 1 tablet by mouth 2 times daily, Disp: 180 tablet, Rfl: 3    losartan (COZAAR) 100 MG tablet, Take 1 tablet by mouth daily, Disp: 90 tablet, Rfl: 3    ascorbic acid (VITAMIN C) 100 MG tablet, , Disp: , Rfl:     Biotin 5 MG CAPS, Take 5 mg by mouth daily, Disp: , Rfl:     L-Lysine 1000 MG TABS, Take 1,000 mg by mouth daily, Disp: , Rfl:     methotrexate Sodium (RHEUMATREX) 50 MG/2ML chemo injection, INJECT 1 ML (25 MG) SUBCUTANEOUSLY ONCE A WEEK, Disp: , Rfl:     omeprazole (PRILOSEC) 20 MG delayed release capsule, Take 20 mg by mouth daily, Disp: , Rfl:     thiamine 100 MG tablet, , Disp: , Rfl:     STELARA 90 MG/ML SOSY prefilled syringe, , Disp: , Rfl:     topiramate (TOPAMAX) 25 MG tablet, Take 1 tablet by mouth 2 times daily In 2 wks, may increase to 2 twice daily, Disp: 124 tablet, Rfl: 5    levothyroxine (SYNTHROID) 125 MCG tablet, Take 1 tablet by mouth daily, Disp: 90 tablet, Rfl: 3    DULoxetine (CYMBALTA) 30 MG extended release capsule, Take 30 mg by mouth daily, Disp: , Rfl:     sulconazole (EXELDERM) 1 % cream, Apply topically bid, Disp: 1 Tube, Rfl: 12    oxiconazole nitrate (OXISTAT) 1 % CREA cream, Bid to breast fold, Disp: 1 Tube, Rfl: 12    inFLIXimab (REMICADE) 100 MG injection, Infuse 800 mg intravenously See Admin Instructions Indications: 800 MG every 5 weeks Over 2 hours every 8 weeks , Disp: , Rfl:     Methotrexate, Anti-Rheumatic, (METHOTREXATE, PF, SC), Inject 1 mL into the skin once a week, Disp: , Rfl:     Cholecalciferol (VITAMIN D3) 2000 UNITS CAPS, Take 3,000 Int'l Units by mouth daily. , Disp: , Rfl:   Amoxicillin-pot clavulanate, Biaxin [clarithromycin], Cefaclor, Clavulanic acid, Doxycycline, and Macrobid [nitrofurantoin]  Social History     Tobacco Use    Smoking status: Never    Smokeless tobacco: Never   Vaping Use    Vaping Use: Never used   Substance Use Topics    Alcohol use: No    Drug use: No     Family History   Problem Relation Age of Onset    Hypertension Mother        Review of Systems   Constitutional:  Negative for chills and fever. HENT:  Positive for congestion, sinus pressure and tinnitus. Negative for ear discharge, ear pain, hearing loss, postnasal drip, rhinorrhea, sinus pain, sneezing and sore throat. Eyes:  Negative for pain and discharge. Respiratory:  Negative for cough and shortness of breath. Cardiovascular:  Negative for chest pain. Gastrointestinal:  Negative for diarrhea and vomiting. Genitourinary:  Negative for flank pain. Musculoskeletal:  Negative for back pain and neck pain. Skin:  Negative for rash. Allergic/Immunologic: Negative. Neurological:  Negative for dizziness, syncope and headaches. All other systems reviewed and are negative.     Ht 5' 2\" (1.575 m)   Wt 202 lb (91.6 kg)   BMI 36.95 kg/m²   Physical Exam  Vitals reviewed. Constitutional:       Appearance: Normal appearance. HENT:      Head: Normocephalic and atraumatic. Jaw: There is normal jaw occlusion. No tenderness. Right Ear: Tympanic membrane, ear canal and external ear normal. No drainage or swelling. Left Ear: Tympanic membrane, ear canal and external ear normal. No drainage or swelling. Nose: Nose normal.      Right Turbinates: Not swollen or pale. Left Turbinates: Not swollen or pale. Mouth/Throat:      Lips: Pink. Mouth: Mucous membranes are moist.     Eyes:      General: Lids are normal.      Conjunctiva/sclera: Conjunctivae normal.      Pupils: Pupils are equal, round, and reactive to light. Cardiovascular:      Rate and Rhythm: Normal rate and regular rhythm. Pulses: Normal pulses. Pulmonary:      Effort: Pulmonary effort is normal. No respiratory distress. Breath sounds: No stridor. Abdominal:      General: Abdomen is flat. Palpations: Abdomen is soft. Musculoskeletal:         General: Normal range of motion. Cervical back: Normal range of motion. No rigidity. Skin:     General: Skin is warm and dry. Neurological:      General: No focal deficit present. Mental Status: She is alert and oriented to person, place, and time. Psychiatric:         Attention and Perception: Attention normal.         Mood and Affect: Affect normal.         Behavior: Behavior normal. Behavior is cooperative. Thought Content: Thought content normal.         Judgment: Judgment normal.     Ear Culture:  Component 7/11/22 1445    Gram Stain Result Gram stain performed from swab, interpret results with   caution. Swab specimens of sterile fluids are inferior to   aspirate specimens for organism recovery.    Rare Polymorphonuclear leukocytes   Epithelial cells not seen   Few Gram positive cocci in pairs    Organism Staphylococcus aureus Abnormal     Culture Ear or Eye Heavy growth   Methicillin resistant Staph aureus isolated. Most Methicillin   resistant Staphylococcus are usually resistant to multiple   antibiotics including other B-Lactams, Aminoglycosides,   Macrolides, Clindamycin and Tetracycline. Contact isolation   is indicated. Resulting Agency [facility]     IMPRESSION/PLAN:    June Wilson was seen today for follow-up. Diagnoses and all orders for this visit:    Other infective otitis externa of both ears, unspecified chronicity    MRSA (methicillin resistant staph aureus) culture positive    Other orders  -     azelastine (ASTELIN) 0.1 % nasal spray; 1-2 sprays by Nasal route 2 times daily as needed for Rhinitis Use in each nostril as directed      Patient has shown significant improvement of her symptoms with no further drainage in either ear following Ciprodex usage for the past 2 weeks. Patient's hearing has returned with no further symptoms. She will continue to follow-up with ID in regards to her MRSA infection from further IV treatment if necessary. She is also instructed to resume her Ciprodex drops for any return of symptoms including pain or drainage from either ear. She will also be placed on Astelin spray, 1 to 2 sprays each nostril twice daily as needed for congestion and postnasal drainage symptoms. She will follow-up in 6 weeks for reevaluation. She is instructed to call with any new or worsening symptoms prior to her next appointment.         Elida Bates, MSN, FNP-C  [facility], Nose and Throat    The information contained in this note has been dictated using drug and medical speech recognition software and may contain errors

## 2022-08-08 ENCOUNTER — OFFICE VISIT (OUTPATIENT)
Dept: FAMILY MEDICINE CLINIC | Age: 62
End: 2022-08-08
Payer: COMMERCIAL

## 2022-08-08 VITALS
TEMPERATURE: 97.3 F | RESPIRATION RATE: 18 BRPM | BODY MASS INDEX: 37.36 KG/M2 | HEIGHT: 62 IN | WEIGHT: 203 LBS | OXYGEN SATURATION: 97 % | SYSTOLIC BLOOD PRESSURE: 154 MMHG | DIASTOLIC BLOOD PRESSURE: 98 MMHG | HEART RATE: 76 BPM

## 2022-08-08 DIAGNOSIS — J01.90 ACUTE NON-RECURRENT SINUSITIS, UNSPECIFIED LOCATION: ICD-10-CM

## 2022-08-08 DIAGNOSIS — J06.9 ACUTE UPPER RESPIRATORY INFECTION, UNSPECIFIED: Primary | ICD-10-CM

## 2022-08-08 DIAGNOSIS — R09.82 POSTNASAL DRIP: ICD-10-CM

## 2022-08-08 DIAGNOSIS — R07.0 PAIN IN THROAT: ICD-10-CM

## 2022-08-08 PROCEDURE — 99203 OFFICE O/P NEW LOW 30 MIN: CPT | Performed by: PHYSICIAN ASSISTANT

## 2022-08-08 PROCEDURE — G8427 DOCREV CUR MEDS BY ELIG CLIN: HCPCS | Performed by: PHYSICIAN ASSISTANT

## 2022-08-08 PROCEDURE — 3017F COLORECTAL CA SCREEN DOC REV: CPT | Performed by: PHYSICIAN ASSISTANT

## 2022-08-08 PROCEDURE — G8417 CALC BMI ABV UP PARAM F/U: HCPCS | Performed by: PHYSICIAN ASSISTANT

## 2022-08-08 PROCEDURE — 1036F TOBACCO NON-USER: CPT | Performed by: PHYSICIAN ASSISTANT

## 2022-08-08 RX ORDER — AZITHROMYCIN 250 MG/1
250 TABLET, FILM COATED ORAL SEE ADMIN INSTRUCTIONS
Qty: 6 TABLET | Refills: 0 | Status: SHIPPED | OUTPATIENT
Start: 2022-08-08 | End: 2022-08-13

## 2022-08-08 NOTE — PROGRESS NOTES
22  Avinash Craig : 1960 Sex: female  Age 63 y.o. Subjective:  Chief Complaint   Patient presents with    Pharyngitis     Just got over COVID 1 week ago    Cough         HPI:   Avinash Craig , 63 y.o. female presents to express care for evaluation of sore throat, cough, congestion    HPI  21-year-old female presents to express care for evaluation cough, congestion, drainage. The patient's had the symptoms ongoing for about a week now. Patient states about 2 to 3 weeks ago she had COVID. Patient did have a MRSA infection in her ears. The patient was placed on antibiotics at that time. The patient has been placed on drops. The patient is scheduled to follow-up with infectious disease. The patient is not having any fevers or chills currently. The patient does have congestion and sore throat. ROS:   Unless otherwise stated in this report the patient's positive and negative responses for review of systems for constitutional, eyes, ENT, cardiovascular, respiratory, gastrointestinal, neurological, , musculoskeletal, and integument systems and related systems to the presenting problem are either stated in the history of present illness or were not pertinent or were negative for the symptoms and/or complaints related to the presenting medical problem. Positives and pertinent negatives as per HPI. All others reviewed and are negative.       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    Altered bowel elimination due to intestinal ostomy ([facility] [address]     Anemia     Arthritis     Crohn disease ([facility] [address]     Fatty liver     Hernia     History of DVT (deep vein thrombosis) 6/10/2015    History of pulmonary embolus (PE) 6/10/2015    Hyperlipemia     Hypertension     Hypothyroidism     Intervertebral lumbar disc disorder with myelopathy, lumbar region     Leukocytosis     Migraine variant with headache 2022    Movement disorder     MRSA infection     UTI    Obesity     Osteoarthritis of both knees     Palpitations     PE (pulmonary embolism)     Rash     fine skin rash not itchy gastro dr aware    Subclavian vein stenosis     Syncope     Thyroid disease     Uterine fibroid     Vitamin D deficiency        Past Surgical History:   Procedure Laterality Date    ABDOMEN SURGERY  11/2/14    I&d abdominal wound    CARPAL TUNNEL RELEASE      CHOLECYSTECTOMY      COLECTOMY      ostomy    COLECTOMY      COLONOSCOPY      ENDOMETRIAL ABLATION      GASTROSTOMY TUBE PLACEMENT      HERNIA REPAIR      ILEOSTOMY OR JEJUNOSTOMY      placed 2/2015 reversed 9/2015    JOINT REPLACEMENT  11/2013    both knees    TONSILLECTOMY      TOTAL KNEE ARTHROPLASTY Bilateral 2013    TUBAL LIGATION      TUNNELED VENOUS CATHETER PLACEMENT         Family History   Problem Relation Age of Onset    Hypertension Mother        Medications:     Current Outpatient Medications:     azithromycin (ZITHROMAX) 250 MG tablet, Take 1 tablet by mouth See Admin Instructions for 5 days 500mg on day 1 followed by 250mg on days 2 - 5, Disp: 6 tablet, Rfl: 0    azelastine (ASTELIN) 0.1 % nasal spray, 1-2 sprays by Nasal route 2 times daily as needed for Rhinitis Use in each nostril as directed, Disp: 30 mL, Rfl: 1    hydroCHLOROthiazide (HYDRODIURIL) 25 MG tablet, Take 1 tablet by mouth every morning, Disp: 90 tablet, Rfl: 5    zinc gluconate 50 MG tablet, Take 50 mg by mouth daily, Disp: , Rfl:     magnesium (MAGNESIUM-OXIDE) 250 MG TABS tablet, Take 250 mg by mouth daily, Disp: , Rfl:     traZODone (DESYREL) 50 MG tablet, Take 2 tablets by mouth nightly, Disp: 180 tablet, Rfl: 3    metoprolol tartrate (LOPRESSOR) 25 MG tablet, Take 1 tablet by mouth 2 times daily, Disp: 180 tablet, Rfl: 3    potassium chloride (KLOR-CON M) 20 MEQ extended release tablet, Take 1 tablet by mouth 2 times daily, Disp: 180 tablet, Rfl: 3    losartan (COZAAR) 100 MG tablet, Take 1 tablet by mouth daily, Disp: 90 tablet, Rfl: 3    ascorbic acid (VITAMIN C) 100 MG tablet, , Disp: , Rfl:     Biotin 5 MG CAPS, Take 5 mg by mouth daily, Disp: , Rfl:     L-Lysine 1000 MG TABS, Take 1,000 mg by mouth daily, Disp: , Rfl:     methotrexate Sodium (RHEUMATREX) 50 MG/2ML chemo injection, INJECT 1 ML (25 MG) SUBCUTANEOUSLY ONCE A WEEK, Disp: , Rfl:     omeprazole (PRILOSEC) 20 MG delayed release capsule, Take 20 mg by mouth daily, Disp: , Rfl:     thiamine 100 MG tablet, , Disp: , Rfl:     STELARA 90 MG/ML SOSY prefilled syringe, , Disp: , Rfl:     topiramate (TOPAMAX) 25 MG tablet, Take 1 tablet by mouth 2 times daily In 2 wks, may increase to 2 twice daily, Disp: 124 tablet, Rfl: 5    levothyroxine (SYNTHROID) 125 MCG tablet, Take 1 tablet by mouth daily, Disp: 90 tablet, Rfl: 3    DULoxetine (CYMBALTA) 30 MG extended release capsule, Take 30 mg by mouth daily, Disp: , Rfl:     sulconazole (EXELDERM) 1 % cream, Apply topically bid, Disp: 1 Tube, Rfl: 12    oxiconazole nitrate (OXISTAT) 1 % CREA cream, Bid to breast fold, Disp: 1 Tube, Rfl: 12    inFLIXimab (REMICADE) 100 MG injection, Infuse 800 mg intravenously See Admin Instructions Indications: 800 MG every 5 weeks Over 2 hours every 8 weeks , Disp: , Rfl:     Methotrexate, Anti-Rheumatic, (METHOTREXATE, PF, SC), Inject 1 mL into the skin once a week, Disp: , Rfl:     Cholecalciferol (VITAMIN D3) 2000 UNITS CAPS, Take 3,000 Int'l Units by mouth daily. , Disp: , Rfl:     Allergies: Allergies   Allergen Reactions    Amoxicillin-Pot Clavulanate Rash    Biaxin [Clarithromycin] Hives and Rash    Cefaclor Hives and Rash     rash    Clavulanic Acid Rash    Doxycycline Rash    Macrobid [Nitrofurantoin] Nausea And Vomiting       Social History:     Social History     Tobacco Use    Smoking status: Never    Smokeless tobacco: Never   Vaping Use    Vaping Use: Never used   Substance Use Topics    Alcohol use: No    Drug use: No       Patient lives at home.     Physical Exam:     Vitals:    08/08/22 1356   BP: (!) 154/98   Site: Right Upper Arm   Position: Sitting   Cuff Size: Large Adult   Pulse: 76   Resp: 18   Temp: 97.3 °F (36.3 °C)   TempSrc: Temporal   SpO2: 97%   Weight: 203 lb (92.1 kg)   Height: 5' 2\" (1.575 m)       Exam:  Physical Exam  Nurse's notes and vital signs reviewed. The patient is not hypoxic. ? General: Alert, no acute distress, patient resting comfortably Patient is not toxic or lethargic. Skin: Warm, intact, no pallor noted. There is no evidence of rash at this time. Head: Normocephalic, atraumatic  Eye: Normal conjunctiva  Ears, Nose, Throat: Right tympanic membrane clear, left tympanic membrane clear, although there is evidence of scarring noted to the tympanic membrane's. No drainage or discharge noted. No pre- or post-auricular tenderness, erythema, or swelling noted. Nasal congestion, rhinorrhea  Posterior oropharynx shows erythema and cobblestoning but no evidence of tonsillar hypertrophy, or exudate. the uvula is midline. No trismus or drooling is noted. Moist mucous membranes. Cardiovascular: Regular Rate and Rhythm  Respiratory: No acute distress, no rhonchi, wheezing or crackles noted. No stridor or retractions are noted. Neurological: A&O x4, normal speech  Psychiatric: Cooperative       Testing:           Medical Decision Making:     Vital signs reviewed    Past medical history reviewed. Allergies reviewed. Medications reviewed. Patient on arrival does not appear to be in any apparent distress or discomfort. The patient has been seen and evaluated. The patient does not appear to be toxic or lethargic. The patient has an extensive allergy list.  She can tolerate azithromycin but despite her Biaxin allergy. The patient will be given azithromycin. The patient will monitor symptoms closely. All the patient follow-up with PCP.   Call with any questions    The patient was educated on the proper dosage of motrin and tylenol and the appropriate intervals of each. The patient is to increase fluid intake over the next several days. The patient is to use OTC decongestant as needed. The patient is to return to express care or go directly to the emergency department should any of the signs or symptoms worsen. The patient is to followup with primary care physician in 2-3 days for repeat evaluation. The patient has no other questions or concerns at this time the patient will be discharged home. Clinical Impression:   Glenn Nickerson was seen today for pharyngitis and cough. Diagnoses and all orders for this visit:    Acute upper respiratory infection, unspecified  -     azithromycin (ZITHROMAX) 250 MG tablet; Take 1 tablet by mouth See Admin Instructions for 5 days 500mg on day 1 followed by 250mg on days 2 - 5    Acute non-recurrent sinusitis, unspecified location    Postnasal drip    Pain in throat      The patient is to call for any concerns or return if any of the signs or symptoms worsen. The patient is to follow-up with PCP in the next 2-3 days for repeat evaluation repeat assessment or go directly to the emergency department.      SIGNATURE: Jermain Bledsoe III, PA-C

## 2022-08-16 DIAGNOSIS — A49.02 MRSA (METHICILLIN RESISTANT STAPHYLOCOCCUS AUREUS) INFECTION: ICD-10-CM

## 2022-08-18 DIAGNOSIS — G44.209 TENSION VASCULAR HEADACHE: ICD-10-CM

## 2022-08-18 DIAGNOSIS — G43.809 MIGRAINE VARIANT WITH HEADACHE: Chronic | ICD-10-CM

## 2022-08-18 RX ORDER — TOPIRAMATE 25 MG/1
TABLET ORAL
Qty: 124 TABLET | Refills: 0 | Status: SHIPPED
Start: 2022-08-18 | End: 2022-09-12

## 2022-08-19 DIAGNOSIS — K50.10 CROHN'S DISEASE OF COLON WITHOUT COMPLICATION (HCC): Chronic | ICD-10-CM

## 2022-08-19 DIAGNOSIS — I10 ESSENTIAL HYPERTENSION: Chronic | ICD-10-CM

## 2022-08-19 LAB
ALBUMIN SERPL-MCNC: 4.4 G/DL (ref 3.5–5.2)
ALP BLD-CCNC: 74 U/L (ref 35–104)
ALT SERPL-CCNC: 7 U/L (ref 0–32)
ANION GAP SERPL CALCULATED.3IONS-SCNC: 12 MMOL/L (ref 7–16)
AST SERPL-CCNC: 14 U/L (ref 0–31)
BASOPHILS ABSOLUTE: 0.09 E9/L (ref 0–0.2)
BASOPHILS RELATIVE PERCENT: 1 % (ref 0–2)
BILIRUB SERPL-MCNC: 0.4 MG/DL (ref 0–1.2)
BUN BLDV-MCNC: 14 MG/DL (ref 6–20)
CALCIUM SERPL-MCNC: 11.9 MG/DL (ref 8.6–10.2)
CHLORIDE BLD-SCNC: 109 MMOL/L (ref 98–107)
CO2: 23 MMOL/L (ref 22–29)
CREAT SERPL-MCNC: 0.7 MG/DL (ref 0.5–1)
EOSINOPHILS ABSOLUTE: 0.26 E9/L (ref 0.05–0.5)
EOSINOPHILS RELATIVE PERCENT: 3 % (ref 0–6)
GFR AFRICAN AMERICAN: >60
GFR NON-AFRICAN AMERICAN: >60 ML/MIN/1.73
GLUCOSE BLD-MCNC: 91 MG/DL (ref 74–99)
HCT VFR BLD CALC: 42.3 % (ref 34–48)
HEMOGLOBIN: 13.1 G/DL (ref 11.5–15.5)
IMMATURE GRANULOCYTES #: 0.03 E9/L
IMMATURE GRANULOCYTES %: 0.3 % (ref 0–5)
LYMPHOCYTES ABSOLUTE: 2.65 E9/L (ref 1.5–4)
LYMPHOCYTES RELATIVE PERCENT: 30.6 % (ref 20–42)
MAGNESIUM: 2.5 MG/DL (ref 1.6–2.6)
MCH RBC QN AUTO: 28.2 PG (ref 26–35)
MCHC RBC AUTO-ENTMCNC: 31 % (ref 32–34.5)
MCV RBC AUTO: 91.2 FL (ref 80–99.9)
MONOCYTES ABSOLUTE: 0.72 E9/L (ref 0.1–0.95)
MONOCYTES RELATIVE PERCENT: 8.3 % (ref 2–12)
NEUTROPHILS ABSOLUTE: 4.91 E9/L (ref 1.8–7.3)
NEUTROPHILS RELATIVE PERCENT: 56.8 % (ref 43–80)
PDW BLD-RTO: 16.5 FL (ref 11.5–15)
PLATELET # BLD: 492 E9/L (ref 130–450)
PMV BLD AUTO: 10.2 FL (ref 7–12)
POTASSIUM SERPL-SCNC: 4.5 MMOL/L (ref 3.5–5)
RBC # BLD: 4.64 E12/L (ref 3.5–5.5)
SODIUM BLD-SCNC: 144 MMOL/L (ref 132–146)
TOTAL PROTEIN: 7.9 G/DL (ref 6.4–8.3)
WBC # BLD: 8.7 E9/L (ref 4.5–11.5)

## 2022-08-22 ENCOUNTER — TELEPHONE (OUTPATIENT)
Dept: PRIMARY CARE CLINIC | Age: 62
End: 2022-08-22

## 2022-08-22 DIAGNOSIS — E83.52 HYPERCALCEMIA: ICD-10-CM

## 2022-08-22 DIAGNOSIS — I10 ESSENTIAL HYPERTENSION: Primary | ICD-10-CM

## 2022-08-22 NOTE — TELEPHONE ENCOUNTER
Notify patient calcium levels are very high. Get another blood test done in the next couple days and see me one week  later.

## 2022-08-26 DIAGNOSIS — E83.52 HYPERCALCEMIA: ICD-10-CM

## 2022-08-26 DIAGNOSIS — I10 ESSENTIAL HYPERTENSION: ICD-10-CM

## 2022-08-26 LAB
ALBUMIN SERPL-MCNC: 4.3 G/DL (ref 3.5–5.2)
ALP BLD-CCNC: 68 U/L (ref 35–104)
ALT SERPL-CCNC: 16 U/L (ref 0–32)
ANION GAP SERPL CALCULATED.3IONS-SCNC: 10 MMOL/L (ref 7–16)
AST SERPL-CCNC: 16 U/L (ref 0–31)
BILIRUB SERPL-MCNC: 0.3 MG/DL (ref 0–1.2)
BUN BLDV-MCNC: 13 MG/DL (ref 6–20)
CALCIUM SERPL-MCNC: 10.2 MG/DL (ref 8.6–10.2)
CHLORIDE BLD-SCNC: 110 MMOL/L (ref 98–107)
CO2: 24 MMOL/L (ref 22–29)
CREAT SERPL-MCNC: 0.9 MG/DL (ref 0.5–1)
GFR AFRICAN AMERICAN: >60
GFR NON-AFRICAN AMERICAN: >60 ML/MIN/1.73
GLUCOSE BLD-MCNC: 91 MG/DL (ref 74–99)
PARATHYROID HORMONE INTACT: 68 PG/ML (ref 15–65)
POTASSIUM SERPL-SCNC: 3.8 MMOL/L (ref 3.5–5)
SODIUM BLD-SCNC: 144 MMOL/L (ref 132–146)
TOTAL PROTEIN: 7.5 G/DL (ref 6.4–8.3)

## 2022-08-31 ENCOUNTER — OFFICE VISIT (OUTPATIENT)
Dept: PRIMARY CARE CLINIC | Age: 62
End: 2022-08-31
Payer: COMMERCIAL

## 2022-08-31 VITALS
DIASTOLIC BLOOD PRESSURE: 82 MMHG | BODY MASS INDEX: 38.09 KG/M2 | SYSTOLIC BLOOD PRESSURE: 145 MMHG | TEMPERATURE: 98 F | WEIGHT: 207 LBS | HEIGHT: 62 IN

## 2022-08-31 DIAGNOSIS — E83.52 HYPERCALCEMIA: ICD-10-CM

## 2022-08-31 DIAGNOSIS — M19.90 INFLAMMATORY ARTHRITIS: ICD-10-CM

## 2022-08-31 DIAGNOSIS — K50.10 CROHN'S DISEASE OF COLON WITHOUT COMPLICATION (HCC): Chronic | ICD-10-CM

## 2022-08-31 DIAGNOSIS — I10 ESSENTIAL HYPERTENSION: Primary | ICD-10-CM

## 2022-08-31 DIAGNOSIS — E21.3 HYPERPARATHYROIDISM (HCC): ICD-10-CM

## 2022-08-31 PROCEDURE — 99214 OFFICE O/P EST MOD 30 MIN: CPT | Performed by: FAMILY MEDICINE

## 2022-08-31 PROCEDURE — G8417 CALC BMI ABV UP PARAM F/U: HCPCS | Performed by: FAMILY MEDICINE

## 2022-08-31 PROCEDURE — 3017F COLORECTAL CA SCREEN DOC REV: CPT | Performed by: FAMILY MEDICINE

## 2022-08-31 PROCEDURE — 1036F TOBACCO NON-USER: CPT | Performed by: FAMILY MEDICINE

## 2022-08-31 PROCEDURE — G8428 CUR MEDS NOT DOCUMENT: HCPCS | Performed by: FAMILY MEDICINE

## 2022-08-31 ASSESSMENT — PATIENT HEALTH QUESTIONNAIRE - PHQ9
2. FEELING DOWN, DEPRESSED OR HOPELESS: 0
SUM OF ALL RESPONSES TO PHQ QUESTIONS 1-9: 0
1. LITTLE INTEREST OR PLEASURE IN DOING THINGS: 0
SUM OF ALL RESPONSES TO PHQ QUESTIONS 1-9: 0
SUM OF ALL RESPONSES TO PHQ9 QUESTIONS 1 & 2: 0
SUM OF ALL RESPONSES TO PHQ QUESTIONS 1-9: 0
SUM OF ALL RESPONSES TO PHQ QUESTIONS 1-9: 0

## 2022-08-31 ASSESSMENT — ENCOUNTER SYMPTOMS
RESPIRATORY NEGATIVE: 1
GASTROINTESTINAL NEGATIVE: 1
EYES NEGATIVE: 1
ALLERGIC/IMMUNOLOGIC NEGATIVE: 1

## 2022-08-31 NOTE — PROGRESS NOTES
22  Name: Rj Morris    : 1960    Sex: female    Age: 63 y.o. Subjective:  Chief Complaint: Patient is here for re ck    re  calcium    ID did lab and   elev   calcium     Ears feels better  re ck  ca better pth  high     no cp or sob      Review of Systems   Constitutional: Negative. HENT: Negative. Negative for ear pain (better). Eyes: Negative. Respiratory: Negative. Cardiovascular: Negative. Gastrointestinal: Negative. Endocrine: Negative. Genitourinary: Negative. Musculoskeletal: Negative. Skin: Negative. Allergic/Immunologic: Negative. Neurological: Negative. Hematological: Negative. Psychiatric/Behavioral: Negative.          Current Outpatient Medications:     topiramate (TOPAMAX) 25 MG tablet, take 1 tablet by mouth twice a day IN WEEKS MAY INCREASE TO 2 TABLETS TWICE A DAY, Disp: 124 tablet, Rfl: 0    azelastine (ASTELIN) 0.1 % nasal spray, 1-2 sprays by Nasal route 2 times daily as needed for Rhinitis Use in each nostril as directed, Disp: 30 mL, Rfl: 1    hydroCHLOROthiazide (HYDRODIURIL) 25 MG tablet, Take 1 tablet by mouth every morning, Disp: 90 tablet, Rfl: 5    zinc gluconate 50 MG tablet, Take 50 mg by mouth daily, Disp: , Rfl:     magnesium (MAGNESIUM-OXIDE) 250 MG TABS tablet, Take 250 mg by mouth daily, Disp: , Rfl:     traZODone (DESYREL) 50 MG tablet, Take 2 tablets by mouth nightly, Disp: 180 tablet, Rfl: 3    metoprolol tartrate (LOPRESSOR) 25 MG tablet, Take 1 tablet by mouth 2 times daily, Disp: 180 tablet, Rfl: 3    potassium chloride (KLOR-CON M) 20 MEQ extended release tablet, Take 1 tablet by mouth 2 times daily, Disp: 180 tablet, Rfl: 3    losartan (COZAAR) 100 MG tablet, Take 1 tablet by mouth daily, Disp: 90 tablet, Rfl: 3    ascorbic acid (VITAMIN C) 100 MG tablet, , Disp: , Rfl:     Biotin 5 MG CAPS, Take 5 mg by mouth daily, Disp: , Rfl:     L-Lysine 1000 MG TABS, Take 1,000 mg by mouth daily, Disp: , Rfl: methotrexate Sodium (RHEUMATREX) 50 MG/2ML chemo injection, INJECT 1 ML (25 MG) SUBCUTANEOUSLY ONCE A WEEK, Disp: , Rfl:     omeprazole (PRILOSEC) 20 MG delayed release capsule, Take 20 mg by mouth daily, Disp: , Rfl:     thiamine 100 MG tablet, , Disp: , Rfl:     STELARA 90 MG/ML SOSY prefilled syringe, , Disp: , Rfl:     levothyroxine (SYNTHROID) 125 MCG tablet, Take 1 tablet by mouth daily, Disp: 90 tablet, Rfl: 3    DULoxetine (CYMBALTA) 30 MG extended release capsule, Take 30 mg by mouth daily, Disp: , Rfl:     sulconazole (EXELDERM) 1 % cream, Apply topically bid, Disp: 1 Tube, Rfl: 12    oxiconazole nitrate (OXISTAT) 1 % CREA cream, Bid to breast fold, Disp: 1 Tube, Rfl: 12    Methotrexate, Anti-Rheumatic, (METHOTREXATE, PF, SC), Inject 1 mL into the skin once a week, Disp: , Rfl:     Cholecalciferol (VITAMIN D3) 2000 UNITS CAPS, Take 3,000 Int'l Units by mouth daily. , Disp: , Rfl:   Allergies   Allergen Reactions    Amoxicillin-Pot Clavulanate Rash    Biaxin [Clarithromycin] Hives and Rash    Cefaclor Hives and Rash     rash    Clavulanic Acid Rash    Doxycycline Rash    Macrobid [Nitrofurantoin] Nausea And Vomiting     Social History     Socioeconomic History    Marital status:      Spouse name: Not on file    Number of children: Not on file    Years of education: Not on file    Highest education level: Not on file   Occupational History     Employer: [employer] de[employer]   Tobacco Use    Smoking status: Never    Smokeless tobacco: Never   Vaping Use    Vaping Use: Never used   Substance and Sexual Activity    Alcohol use: No    Drug use: No    Sexual activity: Not on file   Other Topics Concern    Not on file   Social History Narrative        Tetanus Immunization - (8/14/2017)    HYPOTHYROID    HTN    ELEV LFT    FATTY LIVER    S/P UMBILICAL HERNIA OR AND SUBSEQUEST INCISIONAL HERNIA OR 8-06    LEUKOCYTOSIS JEMMA    CTS NO OR    OA KNEES SYNVISC----DR ODONNELL    ALLERGY TO DISSOLVABLE SUTURES    UTERINE FIBROID    ----OSP---[employer]    --3    [employer]    OBESITY    UTERINE ABLATION    LOW VIT D 7-10    ECHO 7 -10 STAGE ONE SYED DYSFX    PALP---DR TERRY    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     5-12----DIV 5-13    COLON 1-13 WITH YURICH---TOLD ULCERATIVE COLITIS--NO HELP WITH ASACOL AND BX NEG    DC WTIH INFL BOWEL DIS (CROHNS) AND JOINT SWELLING---REGULE IV STEROIDS---DC ON    PRED AND ANEMIA    STARTED HUMIRA 5-13    BILATERAL TOTAL KNEE DR ODONNELL 11-13    ADMIT 3-14 WITH FLARE OF CROHNS DIS    ADMIT WITH BRONCHITIS 4-14    FLARE CROHNS DIS    COLONOSCOPY 10-14 [facility]----ACTIVE CROHNS COLITIS IN SIGMOID    ADMIT 11-14 WITH VENTRAL WALL ABSCESS AND POSS FISTULA    SYNCOPE 11-14 WITH LACERATION SCALP    OR [facility] 2-11-15---- FOR PARTIAL BOWEL RESECTION--- PEG TUBE IN----DUE TO INFECTED    MESH    admit 5-15 with UTI SEPSIS    ILEOSTOMY REVERSAL 9-15 [facility]    MRSA UTI 10-15    START REMICADE 12-15    eval dr Leeanna Hsieh  3-19 with shots back   Mri with 5 herniated disc    Admit  11-20  With  Ventral hernia fistula  At  [facility]    Son surekha oliveros from [facility]  [place]   for phd program    biochem    Son engaged    Eval vas  dr Spencer Andersen  re right subclavian artery and cleared and see prn    Left shoulder pain  6-22  referred to  dr Niki Haque  6-1-22    Ear infection with mrsa  seen by ID   and ENT  lab with elev  calcium  re ck and calcium better but PTH up 8-22     Social Determinants of Health     Financial Resource Strain: Not on file   Food Insecurity: Not on file   Transportation Needs: Not on file   Physical Activity: Not on file   Stress: Not on file   Social Connections: Not on file   Intimate Partner Violence: Not on file   Housing Stability: Not on file      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    Altered bowel elimination due to intestinal ostomy ([facility] [address]     Anemia     Arthritis     Crohn disease ([facility] [address]     Fatty liver     Hernia     History of DVT (deep vein thrombosis) 6/10/2015    History of pulmonary embolus (PE) 6/10/2015    Hyperlipemia     Hypertension     Hypothyroidism     Intervertebral lumbar disc disorder with myelopathy, lumbar region     Leukocytosis     Migraine variant with headache 1/26/2022    Movement disorder     MRSA infection     UTI    Obesity     Osteoarthritis of both knees     Palpitations     PE (pulmonary embolism)     Rash     fine skin rash not itchy gastro dr aware    Subclavian vein stenosis     Syncope     Thyroid disease     Uterine fibroid     Vitamin D deficiency      Family History   Problem Relation Age of Onset    Hypertension Mother       Past Surgical History:   Procedure Laterality Date    ABDOMEN SURGERY  11/2/14    I&d abdominal wound    CARPAL TUNNEL RELEASE      CHOLECYSTECTOMY      COLECTOMY      ostomy    COLECTOMY      COLONOSCOPY      ENDOMETRIAL ABLATION      GASTROSTOMY TUBE PLACEMENT      HERNIA REPAIR      ILEOSTOMY OR JEJUNOSTOMY      placed 2/2015 reversed 9/2015    JOINT REPLACEMENT  11/2013    both knees    TONSILLECTOMY      TOTAL KNEE ARTHROPLASTY Bilateral 2013    TUBAL LIGATION      TUNNELED VENOUS CATHETER PLACEMENT        Vitals:    08/31/22 0646   BP: (!) 145/82   Temp: 98 °F (36.7 °C)   TempSrc: Oral   Weight: 207 lb (93.9 kg)   Height: 5' 2\" (1.575 m)       Objective:    Physical Exam  Vitals reviewed. Constitutional:       Appearance: Normal appearance. She is well-developed. HENT:      Head: Normocephalic. Right Ear: Tympanic membrane normal.      Left Ear: Tympanic membrane normal.      Nose: Nose normal.      Mouth/Throat:      Mouth: Mucous membranes are moist.   Eyes:      Pupils: Pupils are equal, round, and reactive to light. Cardiovascular:      Rate and Rhythm: Normal rate and regular rhythm.    Pulmonary:      Effort: Pulmonary effort is normal.      Breath sounds: Normal breath sounds. Abdominal:      General: Bowel sounds are normal.      Palpations: Abdomen is soft. Musculoskeletal:         General: Normal range of motion. Cervical back: Normal range of motion. Skin:     General: Skin is warm. Neurological:      Mental Status: She is alert and oriented to person, place, and time. Psychiatric:         Behavior: Behavior normal.       Hanna Rodríguez was seen today for discuss labs. Diagnoses and all orders for this visit:    Essential hypertension    Hypercalcemia  -     Kameron Del Rio MD, Endocrinology, [place]    Hyperparathyroidism [facility])  -     Kameron Del Rio MD, Endocrinology, [place]    Crohn's disease of colon without complication [facility])    Inflammatory arthritis  -     [address], Rheumatology, [place]      Comments:  appt endo     re  PTH     offer rheum in town  diet exer    I educated the patient about all medications. Make sure they were correct and educated  on the risk associated with missing meds or taking them incorrectly. A list of medications is being sent home with patient today. Check blood pressure at home twice a day. Low-salt low caffeine diet. Call if systolic blood pressure is above 128 and diastolic blood pressures above 85. Only use a upper arm digital cuff. I informed patient about the risk associated with noncompliance of medication and taking medications incorrectly. Appropriate follow-up with myself and all specialist.  Encourage family members to take active role in assisting with medications and medical care. If any confusion should develop to notify my office immediately to avoid risk of worsening medical condition    A great deal of time spent reviewing medications, diet, exercise, social issues. Also reviewing the chart before entering the room with patient and finishing charting after leaving patient's room.  More than half of that time was spent face to face with the patient in

## 2022-09-02 ENCOUNTER — APPOINTMENT (OUTPATIENT)
Dept: GENERAL RADIOLOGY | Age: 62
DRG: 566 | End: 2022-09-02
Payer: COMMERCIAL

## 2022-09-02 ENCOUNTER — HOSPITAL ENCOUNTER (INPATIENT)
Age: 62
LOS: 4 days | Discharge: HOME | DRG: 566 | End: 2022-09-06
Attending: EMERGENCY MEDICINE | Admitting: SURGERY
Payer: COMMERCIAL

## 2022-09-02 ENCOUNTER — APPOINTMENT (OUTPATIENT)
Dept: CT IMAGING | Age: 62
DRG: 566 | End: 2022-09-02
Payer: COMMERCIAL

## 2022-09-02 DIAGNOSIS — S20.219A CONTUSION OF CHEST WALL, UNSPECIFIED LATERALITY, INITIAL ENCOUNTER: ICD-10-CM

## 2022-09-02 DIAGNOSIS — T14.8XXA SUBCUTANEOUS HEMATOMA: ICD-10-CM

## 2022-09-02 DIAGNOSIS — S30.1XXA ABDOMINAL WALL HEMATOMA, INITIAL ENCOUNTER: ICD-10-CM
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
## 2022-09-02 PROBLEM — T14.90XA TRAUMA: Status: ACTIVE | Noted: 2022-09-02

## 2022-09-02 LAB
ABO/RH: NORMAL
ACETAMINOPHEN LEVEL: <5 MCG/ML (ref 10–30)
ALBUMIN SERPL-MCNC: 4.2 G/DL (ref 3.5–5.2)
ALP BLD-CCNC: 71 U/L (ref 35–104)
ALT SERPL-CCNC: 25 U/L (ref 0–32)
AMPHETAMINE SCREEN, URINE: NOT DETECTED
ANGLE (CLOT STRENGTH): 71.9 DEGREE (ref 59–74)
ANION GAP SERPL CALCULATED.3IONS-SCNC: 7 MMOL/L (ref 7–16)
ANTIBODY SCREEN: NORMAL
APTT: 29.4 SEC (ref 24.5–35.1)
AST SERPL-CCNC: 32 U/L (ref 0–31)
B.E.: -5.4 MMOL/L (ref -3–3)
BARBITURATE SCREEN URINE: NOT DETECTED
BENZODIAZEPINE SCREEN, URINE: NOT DETECTED
BILIRUB SERPL-MCNC: 0.7 MG/DL (ref 0–1.2)
BUN BLDV-MCNC: 14 MG/DL (ref 6–20)
CALCIUM SERPL-MCNC: 10.9 MG/DL (ref 8.6–10.2)
CANNABINOID SCREEN URINE: NOT DETECTED
CHLORIDE BLD-SCNC: 108 MMOL/L (ref 98–107)
CO2: 25 MMOL/L (ref 22–29)
COCAINE METABOLITE SCREEN URINE: NOT DETECTED
COHB: 0.5 % (ref 0–1.5)
CREAT SERPL-MCNC: 0.9 MG/DL (ref 0.5–1)
CRITICAL: ABNORMAL
DATE ANALYZED: ABNORMAL
DATE OF COLLECTION: ABNORMAL
EKG ATRIAL RATE: 60 BPM
EKG P AXIS: 46 DEGREES
EKG P-R INTERVAL: 214 MS
EKG Q-T INTERVAL: 416 MS
EKG QRS DURATION: 88 MS
EKG QTC CALCULATION (BAZETT): 416 MS
EKG T AXIS: 89 DEGREES
EKG VENTRICULAR RATE: 60 BPM
EPL-TEG: 0 % (ref 0–15)
ETHANOL: <10 MG/DL (ref 0–0.08)
FENTANYL SCREEN, URINE: NOT DETECTED
G-TEG: 13.7 K D/SC (ref 4.5–11)
GFR AFRICAN AMERICAN: >60
GFR NON-AFRICAN AMERICAN: >60 ML/MIN/1.73
GLUCOSE BLD-MCNC: 116 MG/DL (ref 74–99)
HCO3: 18.2 MMOL/L (ref 22–26)
HCT VFR BLD CALC: 40 % (ref 34–48)
HEMOGLOBIN: 12.6 G/DL (ref 11.5–15.5)
HHB: 1.3 % (ref 0–5)
INR BLD: 1.1
K (CLOTTING TIME): 1.2 MIN (ref 1–3)
LAB: ABNORMAL
LACTIC ACID: 1.5 MMOL/L (ref 0.5–2.2)
LY30 (FIBRINOLYSIS): 0 % (ref 0–8)
Lab: ABNORMAL
Lab: NORMAL
MA (MAX AMPLITUDE): 73.3 MM (ref 50–70)
MCH RBC QN AUTO: 28.7 PG (ref 26–35)
MCHC RBC AUTO-ENTMCNC: 31.5 % (ref 32–34.5)
MCV RBC AUTO: 91.1 FL (ref 80–99.9)
METHADONE SCREEN, URINE: NOT DETECTED
METHB: 0 % (ref 0–1.5)
MODE: ABNORMAL
O2 CONTENT: 18.3 ML/DL
O2 SATURATION: 98.7 % (ref 92–98.5)
O2HB: 98.2 % (ref 94–97)
OPERATOR ID: 914
OPIATE SCREEN URINE: NOT DETECTED
OXYCODONE URINE: NOT DETECTED
PATIENT TEMP: 37 C
PCO2: 30.2 MMHG (ref 35–45)
PDW BLD-RTO: 17 FL (ref 11.5–15)
PH BLOOD GAS: 7.4 (ref 7.35–7.45)
PHENCYCLIDINE SCREEN URINE: NOT DETECTED
PLATELET # BLD: 307 E9/L (ref 130–450)
PMV BLD AUTO: 9.3 FL (ref 7–12)
PO2: 181.8 MMHG (ref 75–100)
POTASSIUM SERPL-SCNC: 4.4 MMOL/L (ref 3.5–5)
PROTHROMBIN TIME: 11.5 SEC (ref 9.3–12.4)
R (REACTION TIME): 5.8 MIN (ref 5–10)
RBC # BLD: 4.39 E12/L (ref 3.5–5.5)
SALICYLATE, SERUM: 0.5 MG/DL (ref 0–30)
SODIUM BLD-SCNC: 140 MMOL/L (ref 132–146)
SOURCE, BLOOD GAS: ABNORMAL
THB: 13 G/DL (ref 11.5–16.5)
TIME ANALYZED: 651
TOTAL PROTEIN: 7.5 G/DL (ref 6.4–8.3)
TRICYCLIC ANTIDEPRESSANTS SCREEN SERUM: NEGATIVE NG/ML
TROPONIN, HIGH SENSITIVITY: <6 NG/L (ref 0–9)
WBC # BLD: 13 E9/L (ref 4.5–11.5)

## 2022-09-02 PROCEDURE — 85027 COMPLETE CBC AUTOMATED: CPT

## 2022-09-02 PROCEDURE — 72170 X-RAY EXAM OF PELVIS: CPT

## 2022-09-02 PROCEDURE — 80143 DRUG ASSAY ACETAMINOPHEN: CPT

## 2022-09-02 PROCEDURE — 96375 TX/PRO/DX INJ NEW DRUG ADDON: CPT

## 2022-09-02 PROCEDURE — 80053 COMPREHEN METABOLIC PANEL: CPT

## 2022-09-02 PROCEDURE — 99285 EMERGENCY DEPT VISIT HI MDM: CPT

## 2022-09-02 PROCEDURE — 36600 WITHDRAWAL OF ARTERIAL BLOOD: CPT | Performed by: SURGERY

## 2022-09-02 PROCEDURE — 99223 1ST HOSP IP/OBS HIGH 75: CPT | Performed by: SURGERY

## 2022-09-02 PROCEDURE — 85576 BLOOD PLATELET AGGREGATION: CPT

## 2022-09-02 PROCEDURE — 72125 CT NECK SPINE W/O DYE: CPT

## 2022-09-02 PROCEDURE — 84484 ASSAY OF TROPONIN QUANT: CPT

## 2022-09-02 PROCEDURE — 82805 BLOOD GASES W/O2 SATURATION: CPT

## 2022-09-02 PROCEDURE — 80307 DRUG TEST PRSMV CHEM ANLYZR: CPT

## 2022-09-02 PROCEDURE — 71045 X-RAY EXAM CHEST 1 VIEW: CPT

## 2022-09-02 PROCEDURE — 93005 ELECTROCARDIOGRAM TRACING: CPT | Performed by: SURGERY

## 2022-09-02 PROCEDURE — 86900 BLOOD TYPING SEROLOGIC ABO: CPT

## 2022-09-02 PROCEDURE — 85347 COAGULATION TIME ACTIVATED: CPT

## 2022-09-02 PROCEDURE — 6360000004 HC RX CONTRAST MEDICATION: Performed by: RADIOLOGY

## 2022-09-02 PROCEDURE — 80179 DRUG ASSAY SALICYLATE: CPT

## 2022-09-02 PROCEDURE — 36415 COLL VENOUS BLD VENIPUNCTURE: CPT

## 2022-09-02 PROCEDURE — 6370000000 HC RX 637 (ALT 250 FOR IP): Performed by: NURSE PRACTITIONER

## 2022-09-02 PROCEDURE — 72128 CT CHEST SPINE W/O DYE: CPT

## 2022-09-02 PROCEDURE — 70450 CT HEAD/BRAIN W/O DYE: CPT

## 2022-09-02 PROCEDURE — 6810039000 HC L1 TRAUMA ALERT

## 2022-09-02 PROCEDURE — 6360000002 HC RX W HCPCS: Performed by: EMERGENCY MEDICINE

## 2022-09-02 PROCEDURE — 2580000003 HC RX 258

## 2022-09-02 PROCEDURE — 6370000000 HC RX 637 (ALT 250 FOR IP)

## 2022-09-02 PROCEDURE — 96374 THER/PROPH/DIAG INJ IV PUSH: CPT

## 2022-09-02 PROCEDURE — 85610 PROTHROMBIN TIME: CPT

## 2022-09-02 PROCEDURE — 6360000002 HC RX W HCPCS

## 2022-09-02 PROCEDURE — 71260 CT THORAX DX C+: CPT

## 2022-09-02 PROCEDURE — 2060000000 HC ICU INTERMEDIATE R&B

## 2022-09-02 PROCEDURE — 86850 RBC ANTIBODY SCREEN: CPT

## 2022-09-02 PROCEDURE — 74177 CT ABD & PELVIS W/CONTRAST: CPT

## 2022-09-02 PROCEDURE — 72131 CT LUMBAR SPINE W/O DYE: CPT

## 2022-09-02 PROCEDURE — 82077 ASSAY SPEC XCP UR&BREATH IA: CPT

## 2022-09-02 PROCEDURE — 85730 THROMBOPLASTIN TIME PARTIAL: CPT

## 2022-09-02 PROCEDURE — 85384 FIBRINOGEN ACTIVITY: CPT

## 2022-09-02 PROCEDURE — 36410 VNPNXR 3YR/> PHY/QHP DX/THER: CPT | Performed by: SURGERY

## 2022-09-02 PROCEDURE — 86901 BLOOD TYPING SEROLOGIC RH(D): CPT

## 2022-09-02 PROCEDURE — 83605 ASSAY OF LACTIC ACID: CPT

## 2022-09-02 RX ORDER — HYDRALAZINE HYDROCHLORIDE 20 MG/ML
5 INJECTION INTRAMUSCULAR; INTRAVENOUS EVERY 4 HOURS PRN
Status: DISCONTINUED | OUTPATIENT
Start: 2022-09-02 | End: 2022-09-06 | Stop reason: HOSPADM

## 2022-09-02 RX ORDER — LEVOTHYROXINE SODIUM 0.12 MG/1
125 TABLET ORAL DAILY
COMMUNITY

## 2022-09-02 RX ORDER — POTASSIUM CHLORIDE 20 MEQ/1
20 TABLET, EXTENDED RELEASE ORAL 2 TIMES DAILY
Status: DISCONTINUED | OUTPATIENT
Start: 2022-09-02 | End: 2022-09-06 | Stop reason: HOSPADM

## 2022-09-02 RX ORDER — USTEKINUMAB 90 MG/ML
90 INJECTION, SOLUTION SUBCUTANEOUS
COMMUNITY

## 2022-09-02 RX ORDER — TOPIRAMATE 25 MG/1
50 TABLET ORAL 2 TIMES DAILY
Status: DISCONTINUED | OUTPATIENT
Start: 2022-09-02 | End: 2022-09-06 | Stop reason: HOSPADM

## 2022-09-02 RX ORDER — TOPIRAMATE 25 MG/1
50 TABLET ORAL 2 TIMES DAILY
COMMUNITY

## 2022-09-02 RX ORDER — MORPHINE SULFATE 4 MG/ML
4 INJECTION, SOLUTION INTRAMUSCULAR; INTRAVENOUS ONCE
Status: COMPLETED | OUTPATIENT
Start: 2022-09-02 | End: 2022-09-02

## 2022-09-02 RX ORDER — ATROPINE SULFATE 0.4 MG/ML
1 AMPUL (ML) INJECTION
Status: ACTIVE | OUTPATIENT
Start: 2022-09-03 | End: 2022-09-04

## 2022-09-02 RX ORDER — LOSARTAN POTASSIUM 50 MG/1
100 TABLET ORAL DAILY
Status: DISCONTINUED | OUTPATIENT
Start: 2022-09-02 | End: 2022-09-06 | Stop reason: HOSPADM

## 2022-09-02 RX ORDER — LOSARTAN POTASSIUM 50 MG/1
100 TABLET ORAL DAILY
Status: DISCONTINUED | OUTPATIENT
Start: 2022-09-02 | End: 2022-09-02 | Stop reason: SDUPTHER

## 2022-09-02 RX ORDER — LEVOTHYROXINE SODIUM 0.12 MG/1
125 TABLET ORAL DAILY
Status: DISCONTINUED | OUTPATIENT
Start: 2022-09-03 | End: 2022-09-06 | Stop reason: HOSPADM

## 2022-09-02 RX ORDER — SENNA AND DOCUSATE SODIUM 50; 8.6 MG/1; MG/1
1 TABLET, FILM COATED ORAL 2 TIMES DAILY
Status: DISCONTINUED | OUTPATIENT
Start: 2022-09-02 | End: 2022-09-06 | Stop reason: HOSPADM

## 2022-09-02 RX ORDER — SODIUM CHLORIDE 0.9 % (FLUSH) 0.9 %
5-40 SYRINGE (ML) INJECTION EVERY 12 HOURS SCHEDULED
Status: DISCONTINUED | OUTPATIENT
Start: 2022-09-02 | End: 2022-09-06 | Stop reason: HOSPADM

## 2022-09-02 RX ORDER — SODIUM CHLORIDE 9 MG/ML
INJECTION, SOLUTION INTRAVENOUS PRN
Status: DISCONTINUED | OUTPATIENT
Start: 2022-09-02 | End: 2022-09-06 | Stop reason: HOSPADM

## 2022-09-02 RX ORDER — ATROPINE SULFATE 0.1 MG/ML
INJECTION INTRAVENOUS
Status: DISPENSED
Start: 2022-09-02 | End: 2022-09-03

## 2022-09-02 RX ORDER — POTASSIUM CHLORIDE 20 MEQ/1
20 TABLET, EXTENDED RELEASE ORAL 2 TIMES DAILY
COMMUNITY

## 2022-09-02 RX ORDER — POLYETHYLENE GLYCOL 3350 17 G/17G
17 POWDER, FOR SOLUTION ORAL DAILY
Status: DISCONTINUED | OUTPATIENT
Start: 2022-09-02 | End: 2022-09-06 | Stop reason: HOSPADM

## 2022-09-02 RX ORDER — FOLIC ACID 1 MG/1
1 TABLET ORAL 2 TIMES DAILY
COMMUNITY

## 2022-09-02 RX ORDER — DULOXETIN HYDROCHLORIDE 60 MG/1
60 CAPSULE, DELAYED RELEASE ORAL DAILY
COMMUNITY

## 2022-09-02 RX ORDER — ONDANSETRON 2 MG/ML
2 INJECTION INTRAMUSCULAR; INTRAVENOUS EVERY 6 HOURS PRN
Status: DISCONTINUED | OUTPATIENT
Start: 2022-09-02 | End: 2022-09-06 | Stop reason: HOSPADM

## 2022-09-02 RX ORDER — BISACODYL 10 MG
10 SUPPOSITORY, RECTAL RECTAL DAILY PRN
Status: DISCONTINUED | OUTPATIENT
Start: 2022-09-02 | End: 2022-09-06 | Stop reason: HOSPADM

## 2022-09-02 RX ORDER — ACETAMINOPHEN 325 MG/1
650 TABLET ORAL 4 TIMES DAILY
Status: DISCONTINUED | OUTPATIENT
Start: 2022-09-02 | End: 2022-09-06 | Stop reason: HOSPADM

## 2022-09-02 RX ORDER — SODIUM CHLORIDE 9 MG/ML
INJECTION, SOLUTION INTRAVENOUS CONTINUOUS
Status: DISCONTINUED | OUTPATIENT
Start: 2022-09-02 | End: 2022-09-03

## 2022-09-02 RX ORDER — SODIUM CHLORIDE 0.9 % (FLUSH) 0.9 %
10 SYRINGE (ML) INJECTION
Status: ACTIVE | OUTPATIENT
Start: 2022-09-02 | End: 2022-09-02

## 2022-09-02 RX ORDER — LOSARTAN POTASSIUM 100 MG/1
100 TABLET ORAL DAILY
COMMUNITY

## 2022-09-02 RX ORDER — DULOXETIN HYDROCHLORIDE 30 MG/1
60 CAPSULE, DELAYED RELEASE ORAL DAILY
Status: DISCONTINUED | OUTPATIENT
Start: 2022-09-02 | End: 2022-09-06 | Stop reason: HOSPADM

## 2022-09-02 RX ORDER — METHOCARBAMOL 500 MG/1
750 TABLET, FILM COATED ORAL 4 TIMES DAILY
Status: DISCONTINUED | OUTPATIENT
Start: 2022-09-02 | End: 2022-09-03

## 2022-09-02 RX ORDER — SODIUM CHLORIDE 0.9 % (FLUSH) 0.9 %
5-40 SYRINGE (ML) INJECTION PRN
Status: DISCONTINUED | OUTPATIENT
Start: 2022-09-02 | End: 2022-09-06 | Stop reason: HOSPADM

## 2022-09-02 RX ADMIN — ACETAMINOPHEN 650 MG: 325 TABLET, FILM COATED ORAL at 21:27

## 2022-09-02 RX ADMIN — DOCUSATE SODIUM 50 MG AND SENNOSIDES 8.6 MG 1 TABLET: 8.6; 5 TABLET, FILM COATED ORAL at 21:27

## 2022-09-02 RX ADMIN — METHOCARBAMOL TABLETS 750 MG: 500 TABLET, COATED ORAL at 17:38

## 2022-09-02 RX ADMIN — ACETAMINOPHEN 650 MG: 325 TABLET, FILM COATED ORAL at 17:38

## 2022-09-02 RX ADMIN — POTASSIUM CHLORIDE 20 MEQ: 20 TABLET, EXTENDED RELEASE ORAL at 21:27

## 2022-09-02 RX ADMIN — LOSARTAN POTASSIUM 100 MG: 50 TABLET, FILM COATED ORAL at 17:41

## 2022-09-02 RX ADMIN — SODIUM CHLORIDE: 9 INJECTION, SOLUTION INTRAVENOUS at 09:16

## 2022-09-02 RX ADMIN — METHOCARBAMOL TABLETS 750 MG: 500 TABLET, COATED ORAL at 21:26

## 2022-09-02 RX ADMIN — MORPHINE SULFATE 4 MG: 4 INJECTION, SOLUTION INTRAMUSCULAR; INTRAVENOUS at 09:19

## 2022-09-02 RX ADMIN — TOPIRAMATE 50 MG: 25 TABLET, FILM COATED ORAL at 23:23

## 2022-09-02 RX ADMIN — ONDANSETRON HYDROCHLORIDE 2 MG: 2 SOLUTION INTRAMUSCULAR; INTRAVENOUS at 09:17

## 2022-09-02 RX ADMIN — DULOXETINE 60 MG: 30 CAPSULE, DELAYED RELEASE ORAL at 21:26

## 2022-09-02 RX ADMIN — IOPAMIDOL 75 ML: 755 INJECTION, SOLUTION INTRAVENOUS at 07:36
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
## 2022-09-02 NOTE — PROGRESS NOTES
Surgical resident ordered consult to cardiology. Attempted to perfect serve cardiology and no one is available. Will pass on to night shift for AM consult.

## 2022-09-02 NOTE — PLAN OF CARE
Problem: Safety - Adult  Goal: Free from fall injury  Outcome: Progressing  Flowsheets (Taken 9/2/2022 1604)  Free From Fall Injury: Instruct family/caregiver on patient safety

## 2022-09-02 NOTE — ED NOTES
RN received report from [facility] \[facility]\"" as well as a confirmation that this patient is marked ready for transport and awaiting her ride to new bed.       Evelyne Garcia RN  09/02/22 8950
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
## 2022-09-03 PROBLEM — I10 PRIMARY HYPERTENSION: Status: ACTIVE | Noted: 2022-09-03

## 2022-09-03 PROBLEM — R00.1 SINUS BRADYCARDIA: Status: ACTIVE | Noted: 2022-09-03

## 2022-09-03 PROBLEM — E66.8 MODERATE OBESITY: Status: ACTIVE | Noted: 2022-09-03

## 2022-09-03 PROBLEM — S30.1XXA ABDOMINAL WALL HEMATOMA: Status: ACTIVE | Noted: 2022-09-03

## 2022-09-03 PROBLEM — S20.219A CONTUSION OF CHEST: Status: ACTIVE | Noted: 2022-09-03

## 2022-09-03 PROBLEM — I44.0 FIRST DEGREE ATRIOVENTRICULAR BLOCK: Status: ACTIVE | Noted: 2022-09-03

## 2022-09-03 LAB
ANION GAP SERPL CALCULATED.3IONS-SCNC: 10 MMOL/L (ref 7–16)
BUN BLDV-MCNC: 8 MG/DL (ref 6–20)
CALCIUM SERPL-MCNC: 10.2 MG/DL (ref 8.6–10.2)
CHLORIDE BLD-SCNC: 107 MMOL/L (ref 98–107)
CO2: 22 MMOL/L (ref 22–29)
CREAT SERPL-MCNC: 0.8 MG/DL (ref 0.5–1)
GFR AFRICAN AMERICAN: >60
GFR NON-AFRICAN AMERICAN: >60 ML/MIN/1.73
GLUCOSE BLD-MCNC: 142 MG/DL (ref 74–99)
MAGNESIUM: 2 MG/DL (ref 1.6–2.6)
POTASSIUM REFLEX MAGNESIUM: 3.2 MMOL/L (ref 3.5–5)
SODIUM BLD-SCNC: 139 MMOL/L (ref 132–146)
T4 FREE: 1.25 NG/DL (ref 0.93–1.7)
TROPONIN, HIGH SENSITIVITY: <6 NG/L (ref 0–9)
TSH SERPL DL<=0.05 MIU/L-ACNC: 0.91 UIU/ML (ref 0.27–4.2)

## 2022-09-03 PROCEDURE — 84484 ASSAY OF TROPONIN QUANT: CPT

## 2022-09-03 PROCEDURE — 84443 ASSAY THYROID STIM HORMONE: CPT

## 2022-09-03 PROCEDURE — 2060000000 HC ICU INTERMEDIATE R&B

## 2022-09-03 PROCEDURE — 97161 PT EVAL LOW COMPLEX 20 MIN: CPT

## 2022-09-03 PROCEDURE — 97165 OT EVAL LOW COMPLEX 30 MIN: CPT

## 2022-09-03 PROCEDURE — 6370000000 HC RX 637 (ALT 250 FOR IP)

## 2022-09-03 PROCEDURE — 2580000003 HC RX 258: Performed by: NURSE PRACTITIONER

## 2022-09-03 PROCEDURE — 93005 ELECTROCARDIOGRAM TRACING: CPT

## 2022-09-03 PROCEDURE — 99222 1ST HOSP IP/OBS MODERATE 55: CPT | Performed by: INTERNAL MEDICINE

## 2022-09-03 PROCEDURE — 36415 COLL VENOUS BLD VENIPUNCTURE: CPT

## 2022-09-03 PROCEDURE — 83735 ASSAY OF MAGNESIUM: CPT

## 2022-09-03 PROCEDURE — 99233 SBSQ HOSP IP/OBS HIGH 50: CPT | Performed by: SURGERY

## 2022-09-03 PROCEDURE — 97535 SELF CARE MNGMENT TRAINING: CPT

## 2022-09-03 PROCEDURE — 2700000000 HC OXYGEN THERAPY PER DAY

## 2022-09-03 PROCEDURE — 80048 BASIC METABOLIC PNL TOTAL CA: CPT

## 2022-09-03 PROCEDURE — 84439 ASSAY OF FREE THYROXINE: CPT

## 2022-09-03 PROCEDURE — APPSS60 APP SPLIT SHARED TIME 46-60 MINUTES: Performed by: NURSE PRACTITIONER

## 2022-09-03 PROCEDURE — 6370000000 HC RX 637 (ALT 250 FOR IP): Performed by: SURGERY

## 2022-09-03 PROCEDURE — 97530 THERAPEUTIC ACTIVITIES: CPT

## 2022-09-03 RX ORDER — METHOCARBAMOL 500 MG/1
1000 TABLET, FILM COATED ORAL 4 TIMES DAILY
Status: DISCONTINUED | OUTPATIENT
Start: 2022-09-03 | End: 2022-09-04

## 2022-09-03 RX ORDER — LIDOCAINE 4 G/G
1 PATCH TOPICAL DAILY
Status: DISCONTINUED | OUTPATIENT
Start: 2022-09-03 | End: 2022-09-06 | Stop reason: HOSPADM

## 2022-09-03 RX ORDER — HEPARIN SODIUM 10000 [USP'U]/ML
5000 INJECTION, SOLUTION INTRAVENOUS; SUBCUTANEOUS EVERY 8 HOURS SCHEDULED
Status: DISCONTINUED | OUTPATIENT
Start: 2022-09-03 | End: 2022-09-06 | Stop reason: HOSPADM

## 2022-09-03 RX ORDER — GABAPENTIN 100 MG/1
200 CAPSULE ORAL 3 TIMES DAILY
Status: DISCONTINUED | OUTPATIENT
Start: 2022-09-03 | End: 2022-09-05

## 2022-09-03 RX ADMIN — GABAPENTIN 200 MG: 100 CAPSULE ORAL at 20:58

## 2022-09-03 RX ADMIN — LEVOTHYROXINE SODIUM 125 MCG: 0.12 TABLET ORAL at 06:28

## 2022-09-03 RX ADMIN — ACETAMINOPHEN 650 MG: 325 TABLET, FILM COATED ORAL at 19:13

## 2022-09-03 RX ADMIN — METHOCARBAMOL TABLETS 1000 MG: 500 TABLET, COATED ORAL at 14:57

## 2022-09-03 RX ADMIN — ACETAMINOPHEN 650 MG: 325 TABLET, FILM COATED ORAL at 09:49

## 2022-09-03 RX ADMIN — ACETAMINOPHEN 650 MG: 325 TABLET, FILM COATED ORAL at 14:57

## 2022-09-03 RX ADMIN — METHOCARBAMOL TABLETS 1000 MG: 500 TABLET, COATED ORAL at 20:58

## 2022-09-03 RX ADMIN — LOSARTAN POTASSIUM 100 MG: 50 TABLET, FILM COATED ORAL at 09:51

## 2022-09-03 RX ADMIN — ACETAMINOPHEN 650 MG: 325 TABLET, FILM COATED ORAL at 20:59

## 2022-09-03 RX ADMIN — POTASSIUM CHLORIDE 20 MEQ: 20 TABLET, EXTENDED RELEASE ORAL at 20:59

## 2022-09-03 RX ADMIN — POTASSIUM CHLORIDE 20 MEQ: 20 TABLET, EXTENDED RELEASE ORAL at 09:48

## 2022-09-03 RX ADMIN — GABAPENTIN 200 MG: 100 CAPSULE ORAL at 09:49

## 2022-09-03 RX ADMIN — TOPIRAMATE 50 MG: 25 TABLET, FILM COATED ORAL at 09:50

## 2022-09-03 RX ADMIN — Medication 10 ML: at 09:54

## 2022-09-03 RX ADMIN — METHOCARBAMOL TABLETS 1000 MG: 500 TABLET, COATED ORAL at 09:49

## 2022-09-03 RX ADMIN — Medication 10 ML: at 21:01

## 2022-09-03 RX ADMIN — METHOCARBAMOL TABLETS 1000 MG: 500 TABLET, COATED ORAL at 19:12

## 2022-09-03 RX ADMIN — TOPIRAMATE 50 MG: 25 TABLET, FILM COATED ORAL at 20:59

## 2022-09-03 RX ADMIN — GABAPENTIN 200 MG: 100 CAPSULE ORAL at 14:50

## 2022-09-03 RX ADMIN — DULOXETINE 60 MG: 30 CAPSULE, DELAYED RELEASE ORAL at 09:48

## 2022-09-03 NOTE — DISCHARGE SUMMARY
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       Signed:   Wendy Stanley DO  9/6/2022  5:08 PM
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
## 2022-09-03 NOTE — PLAN OF CARE
Problem: Safety - Adult  Goal: Free from fall injury  9/3/2022 0542 by Camilla Galvin RN  Outcome: Progressing  9/2/2022 1811 by Awais Newman RN  Outcome: Progressing  Flowsheets (Taken 9/2/2022 1604)  Free From Fall Injury: Carmenza Anaya family/caregiver on patient safety     Problem: Skin/Tissue Integrity  Goal: Absence of new skin breakdown  Description: 1. Monitor for areas of redness and/or skin breakdown  2. Assess vascular access sites hourly  3. Every 4-6 hours minimum:  Change oxygen saturation probe site  4. Every 4-6 hours:  If on nasal continuous positive airway pressure, respiratory therapy assess nares and determine need for appliance change or resting period.   Outcome: Progressing     Problem: Respiratory - Adult  Goal: Achieves optimal ventilation and oxygenation  Outcome: Progressing     Problem: Cardiovascular - Adult  Goal: Maintains optimal cardiac output and hemodynamic stability  Outcome: Progressing
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
## 2022-09-04 PROCEDURE — 2060000000 HC ICU INTERMEDIATE R&B

## 2022-09-04 PROCEDURE — 99232 SBSQ HOSP IP/OBS MODERATE 35: CPT | Performed by: INTERNAL MEDICINE

## 2022-09-04 PROCEDURE — 6370000000 HC RX 637 (ALT 250 FOR IP)

## 2022-09-04 PROCEDURE — 2580000003 HC RX 258: Performed by: NURSE PRACTITIONER

## 2022-09-04 PROCEDURE — 99232 SBSQ HOSP IP/OBS MODERATE 35: CPT | Performed by: SURGERY

## 2022-09-04 PROCEDURE — 6370000000 HC RX 637 (ALT 250 FOR IP): Performed by: SURGERY

## 2022-09-04 RX ORDER — METHOCARBAMOL 750 MG/1
1500 TABLET, FILM COATED ORAL 4 TIMES DAILY
Status: DISCONTINUED | OUTPATIENT
Start: 2022-09-04 | End: 2022-09-05

## 2022-09-04 RX ORDER — OXYCODONE HYDROCHLORIDE 5 MG/1
5 TABLET ORAL EVERY 4 HOURS PRN
Status: DISCONTINUED | OUTPATIENT
Start: 2022-09-04 | End: 2022-09-06 | Stop reason: HOSPADM

## 2022-09-04 RX ADMIN — OXYCODONE 5 MG: 5 TABLET ORAL at 20:58

## 2022-09-04 RX ADMIN — Medication 10 ML: at 09:37

## 2022-09-04 RX ADMIN — TOPIRAMATE 50 MG: 25 TABLET, FILM COATED ORAL at 20:58

## 2022-09-04 RX ADMIN — GABAPENTIN 200 MG: 100 CAPSULE ORAL at 13:43

## 2022-09-04 RX ADMIN — LOSARTAN POTASSIUM 100 MG: 50 TABLET, FILM COATED ORAL at 09:33

## 2022-09-04 RX ADMIN — Medication 10 ML: at 20:59

## 2022-09-04 RX ADMIN — METHOCARBAMOL 1500 MG: 750 TABLET ORAL at 20:59

## 2022-09-04 RX ADMIN — ACETAMINOPHEN 650 MG: 325 TABLET, FILM COATED ORAL at 09:32

## 2022-09-04 RX ADMIN — LEVOTHYROXINE SODIUM 125 MCG: 0.12 TABLET ORAL at 06:19

## 2022-09-04 RX ADMIN — GABAPENTIN 200 MG: 100 CAPSULE ORAL at 09:33

## 2022-09-04 RX ADMIN — TOPIRAMATE 50 MG: 25 TABLET, FILM COATED ORAL at 09:34

## 2022-09-04 RX ADMIN — ACETAMINOPHEN 650 MG: 325 TABLET, FILM COATED ORAL at 13:43

## 2022-09-04 RX ADMIN — GABAPENTIN 200 MG: 100 CAPSULE ORAL at 20:58

## 2022-09-04 RX ADMIN — ACETAMINOPHEN 650 MG: 325 TABLET, FILM COATED ORAL at 20:59

## 2022-09-04 RX ADMIN — DULOXETINE 60 MG: 30 CAPSULE, DELAYED RELEASE ORAL at 09:32

## 2022-09-04 RX ADMIN — ACETAMINOPHEN 650 MG: 325 TABLET, FILM COATED ORAL at 18:54

## 2022-09-04 RX ADMIN — METHOCARBAMOL 1500 MG: 750 TABLET ORAL at 18:54

## 2022-09-04 RX ADMIN — POTASSIUM CHLORIDE 20 MEQ: 20 TABLET, EXTENDED RELEASE ORAL at 09:34

## 2022-09-04 RX ADMIN — POTASSIUM CHLORIDE 20 MEQ: 20 TABLET, EXTENDED RELEASE ORAL at 20:59

## 2022-09-04 RX ADMIN — METHOCARBAMOL TABLETS 1000 MG: 500 TABLET, COATED ORAL at 09:32

## 2022-09-04 ASSESSMENT — PAIN SCALES - GENERAL
PAINLEVEL_OUTOF10: 3
PAINLEVEL_OUTOF10: 0
PAINLEVEL_OUTOF10: 0
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
## 2022-09-05 LAB
ANION GAP SERPL CALCULATED.3IONS-SCNC: 9 MMOL/L (ref 7–16)
BASOPHILS ABSOLUTE: 0.06 E9/L (ref 0–0.2)
BASOPHILS RELATIVE PERCENT: 0.6 % (ref 0–2)
BUN BLDV-MCNC: 10 MG/DL (ref 6–20)
CALCIUM SERPL-MCNC: 10.9 MG/DL (ref 8.6–10.2)
CHLORIDE BLD-SCNC: 103 MMOL/L (ref 98–107)
CO2: 25 MMOL/L (ref 22–29)
CREAT SERPL-MCNC: 0.8 MG/DL (ref 0.5–1)
EOSINOPHILS ABSOLUTE: 0.44 E9/L (ref 0.05–0.5)
EOSINOPHILS RELATIVE PERCENT: 4.4 % (ref 0–6)
GFR AFRICAN AMERICAN: >60
GFR NON-AFRICAN AMERICAN: >60 ML/MIN/1.73
GLUCOSE BLD-MCNC: 111 MG/DL (ref 74–99)
HCT VFR BLD CALC: 37.5 % (ref 34–48)
HEMOGLOBIN: 11.5 G/DL (ref 11.5–15.5)
IMMATURE GRANULOCYTES #: 0.1 E9/L
IMMATURE GRANULOCYTES %: 1 % (ref 0–5)
LYMPHOCYTES ABSOLUTE: 1.91 E9/L (ref 1.5–4)
LYMPHOCYTES RELATIVE PERCENT: 19.2 % (ref 20–42)
MCH RBC QN AUTO: 27.6 PG (ref 26–35)
MCHC RBC AUTO-ENTMCNC: 30.7 % (ref 32–34.5)
MCV RBC AUTO: 90.1 FL (ref 80–99.9)
MONOCYTES ABSOLUTE: 1.23 E9/L (ref 0.1–0.95)
MONOCYTES RELATIVE PERCENT: 12.4 % (ref 2–12)
NEUTROPHILS ABSOLUTE: 6.2 E9/L (ref 1.8–7.3)
NEUTROPHILS RELATIVE PERCENT: 62.4 % (ref 43–80)
PDW BLD-RTO: 17.5 FL (ref 11.5–15)
PLATELET # BLD: 290 E9/L (ref 130–450)
PMV BLD AUTO: 10 FL (ref 7–12)
POTASSIUM SERPL-SCNC: 4.1 MMOL/L (ref 3.5–5)
RBC # BLD: 4.16 E12/L (ref 3.5–5.5)
SODIUM BLD-SCNC: 137 MMOL/L (ref 132–146)
WBC # BLD: 9.9 E9/L (ref 4.5–11.5)

## 2022-09-05 PROCEDURE — 6370000000 HC RX 637 (ALT 250 FOR IP): Performed by: SURGERY

## 2022-09-05 PROCEDURE — 36415 COLL VENOUS BLD VENIPUNCTURE: CPT

## 2022-09-05 PROCEDURE — 1200000000 HC SEMI PRIVATE

## 2022-09-05 PROCEDURE — 6370000000 HC RX 637 (ALT 250 FOR IP)

## 2022-09-05 PROCEDURE — 6370000000 HC RX 637 (ALT 250 FOR IP): Performed by: NURSE PRACTITIONER

## 2022-09-05 PROCEDURE — 97530 THERAPEUTIC ACTIVITIES: CPT

## 2022-09-05 PROCEDURE — 2580000003 HC RX 258: Performed by: NURSE PRACTITIONER

## 2022-09-05 PROCEDURE — 80048 BASIC METABOLIC PNL TOTAL CA: CPT

## 2022-09-05 PROCEDURE — 85025 COMPLETE CBC W/AUTO DIFF WBC: CPT

## 2022-09-05 PROCEDURE — 99232 SBSQ HOSP IP/OBS MODERATE 35: CPT | Performed by: SURGERY

## 2022-09-05 RX ORDER — METHOCARBAMOL 500 MG/1
1000 TABLET, FILM COATED ORAL 4 TIMES DAILY
Status: DISCONTINUED | OUTPATIENT
Start: 2022-09-06 | End: 2022-09-06 | Stop reason: HOSPADM

## 2022-09-05 RX ORDER — GABAPENTIN 300 MG/1
300 CAPSULE ORAL 3 TIMES DAILY
Status: DISCONTINUED | OUTPATIENT
Start: 2022-09-05 | End: 2022-09-06 | Stop reason: HOSPADM

## 2022-09-05 RX ADMIN — METHOCARBAMOL 1500 MG: 750 TABLET ORAL at 21:10

## 2022-09-05 RX ADMIN — LEVOTHYROXINE SODIUM 125 MCG: 0.12 TABLET ORAL at 06:27

## 2022-09-05 RX ADMIN — POTASSIUM CHLORIDE 20 MEQ: 20 TABLET, EXTENDED RELEASE ORAL at 10:01

## 2022-09-05 RX ADMIN — GABAPENTIN 300 MG: 300 CAPSULE ORAL at 10:00

## 2022-09-05 RX ADMIN — POTASSIUM CHLORIDE 20 MEQ: 20 TABLET, EXTENDED RELEASE ORAL at 21:11

## 2022-09-05 RX ADMIN — DOCUSATE SODIUM 50 MG AND SENNOSIDES 8.6 MG 1 TABLET: 8.6; 5 TABLET, FILM COATED ORAL at 10:02

## 2022-09-05 RX ADMIN — ACETAMINOPHEN 650 MG: 325 TABLET, FILM COATED ORAL at 18:36

## 2022-09-05 RX ADMIN — OXYCODONE 5 MG: 5 TABLET ORAL at 13:33

## 2022-09-05 RX ADMIN — ACETAMINOPHEN 650 MG: 325 TABLET, FILM COATED ORAL at 10:01

## 2022-09-05 RX ADMIN — Medication 10 ML: at 21:12

## 2022-09-05 RX ADMIN — Medication 10 ML: at 10:03

## 2022-09-05 RX ADMIN — OXYCODONE 5 MG: 5 TABLET ORAL at 07:54

## 2022-09-05 RX ADMIN — ACETAMINOPHEN 650 MG: 325 TABLET, FILM COATED ORAL at 13:28

## 2022-09-05 RX ADMIN — DOCUSATE SODIUM 50 MG AND SENNOSIDES 8.6 MG 1 TABLET: 8.6; 5 TABLET, FILM COATED ORAL at 21:14

## 2022-09-05 RX ADMIN — TOPIRAMATE 50 MG: 25 TABLET, FILM COATED ORAL at 23:57

## 2022-09-05 RX ADMIN — METHOCARBAMOL 1500 MG: 750 TABLET ORAL at 13:54

## 2022-09-05 RX ADMIN — GABAPENTIN 300 MG: 300 CAPSULE ORAL at 21:07

## 2022-09-05 RX ADMIN — ACETAMINOPHEN 650 MG: 325 TABLET, FILM COATED ORAL at 21:07

## 2022-09-05 RX ADMIN — LOSARTAN POTASSIUM 100 MG: 50 TABLET, FILM COATED ORAL at 10:00

## 2022-09-05 RX ADMIN — TOPIRAMATE 50 MG: 25 TABLET, FILM COATED ORAL at 10:00

## 2022-09-05 RX ADMIN — GABAPENTIN 300 MG: 300 CAPSULE ORAL at 13:28

## 2022-09-05 RX ADMIN — DULOXETINE 60 MG: 30 CAPSULE, DELAYED RELEASE ORAL at 10:00

## 2022-09-05 RX ADMIN — METHOCARBAMOL 1500 MG: 750 TABLET ORAL at 10:01

## 2022-09-05 ASSESSMENT — PAIN SCALES - GENERAL
PAINLEVEL_OUTOF10: 5
PAINLEVEL_OUTOF10: 9

## 2022-09-05 ASSESSMENT — PAIN DESCRIPTION - DESCRIPTORS: DESCRIPTORS: TIGHTNESS

## 2022-09-05 ASSESSMENT — PAIN DESCRIPTION - ORIENTATION: ORIENTATION: RIGHT

## 2022-09-05 ASSESSMENT — PAIN DESCRIPTION - LOCATION: LOCATION: CHEST

## 2022-09-05 NOTE — PROGRESS NOTES
Pt still wincing with pain while taking scheduled Robaxin and tylenol. Oxy 5 administered after verifying pts vitals were appropriate.
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
## 2022-09-05 NOTE — PROGRESS NOTES
Report called to juan, neuro, spine. Monitor taken off patient placed back at desk.  Patient belongings placed on bed, patient placed in transport
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
## 2022-09-05 NOTE — PROGRESS NOTES
Messaged  about pts dose of Robaxin not managing her pain and her being a little drowsy when her Oxy is administered for the breakthrough pain. Discussed with pt and she said that she doesn't feel that its managing her pain but the oxy makes her \"sleepy\" when she takes it.

## 2022-09-06 VITALS
WEIGHT: 210 LBS | HEART RATE: 86 BPM | OXYGEN SATURATION: 99 % | TEMPERATURE: 98 F | SYSTOLIC BLOOD PRESSURE: 127 MMHG | HEIGHT: 62 IN | BODY MASS INDEX: 38.64 KG/M2 | RESPIRATION RATE: 16 BRPM | DIASTOLIC BLOOD PRESSURE: 84 MMHG

## 2022-09-06 LAB
EKG ATRIAL RATE: 69 BPM
EKG P AXIS: 67 DEGREES
EKG P-R INTERVAL: 186 MS
EKG Q-T INTERVAL: 392 MS
EKG QRS DURATION: 88 MS
EKG QTC CALCULATION (BAZETT): 420 MS
EKG R AXIS: 70 DEGREES
EKG VENTRICULAR RATE: 69 BPM

## 2022-09-06 PROCEDURE — 6370000000 HC RX 637 (ALT 250 FOR IP)

## 2022-09-06 PROCEDURE — 6370000000 HC RX 637 (ALT 250 FOR IP): Performed by: SURGERY

## 2022-09-06 PROCEDURE — 97530 THERAPEUTIC ACTIVITIES: CPT

## 2022-09-06 PROCEDURE — 99238 HOSP IP/OBS DSCHRG MGMT 30/<: CPT | Performed by: SURGERY

## 2022-09-06 PROCEDURE — 2580000003 HC RX 258: Performed by: NURSE PRACTITIONER

## 2022-09-06 RX ORDER — GABAPENTIN 300 MG/1
300 CAPSULE ORAL 3 TIMES DAILY
Qty: 30 CAPSULE | Refills: 0 | Status: SHIPPED | OUTPATIENT
Start: 2022-09-06 | End: 2022-09-13 | Stop reason: ALTCHOICE

## 2022-09-06 RX ORDER — OXYCODONE HYDROCHLORIDE 5 MG/1
5 TABLET ORAL EVERY 6 HOURS PRN
Qty: 28 TABLET | Refills: 0 | Status: SHIPPED | OUTPATIENT
Start: 2022-09-06 | End: 2022-09-13 | Stop reason: ALTCHOICE

## 2022-09-06 RX ORDER — LIDOCAINE 4 G/G
1 PATCH TOPICAL DAILY
Qty: 10 EACH | Refills: 0 | Status: SHIPPED | OUTPATIENT
Start: 2022-09-07 | End: 2022-09-17

## 2022-09-06 RX ORDER — METHOCARBAMOL 500 MG/1
1000 TABLET, FILM COATED ORAL 4 TIMES DAILY
Qty: 80 TABLET | Refills: 0 | Status: SHIPPED | OUTPATIENT
Start: 2022-09-06 | End: 2022-09-13 | Stop reason: ALTCHOICE

## 2022-09-06 RX ADMIN — ACETAMINOPHEN 650 MG: 325 TABLET, FILM COATED ORAL at 13:06

## 2022-09-06 RX ADMIN — POTASSIUM CHLORIDE 20 MEQ: 20 TABLET, EXTENDED RELEASE ORAL at 08:32

## 2022-09-06 RX ADMIN — TOPIRAMATE 50 MG: 25 TABLET, FILM COATED ORAL at 08:32

## 2022-09-06 RX ADMIN — GABAPENTIN 300 MG: 300 CAPSULE ORAL at 08:32

## 2022-09-06 RX ADMIN — LOSARTAN POTASSIUM 100 MG: 50 TABLET, FILM COATED ORAL at 08:32

## 2022-09-06 RX ADMIN — DULOXETINE 60 MG: 30 CAPSULE, DELAYED RELEASE ORAL at 08:33

## 2022-09-06 RX ADMIN — GABAPENTIN 300 MG: 300 CAPSULE ORAL at 15:04

## 2022-09-06 RX ADMIN — METHOCARBAMOL 1000 MG: 500 TABLET ORAL at 13:07

## 2022-09-06 RX ADMIN — ACETAMINOPHEN 650 MG: 325 TABLET, FILM COATED ORAL at 16:38

## 2022-09-06 RX ADMIN — METHOCARBAMOL 1000 MG: 500 TABLET ORAL at 08:32

## 2022-09-06 RX ADMIN — Medication 10 ML: at 08:33

## 2022-09-06 RX ADMIN — METHOCARBAMOL 1000 MG: 500 TABLET ORAL at 16:37

## 2022-09-06 RX ADMIN — ACETAMINOPHEN 650 MG: 325 TABLET, FILM COATED ORAL at 08:33

## 2022-09-06 ASSESSMENT — PAIN DESCRIPTION - DESCRIPTORS
DESCRIPTORS: SORE;ACHING;NAGGING
DESCRIPTORS: ACHING;DISCOMFORT;DULL
DESCRIPTORS: ACHING;DISCOMFORT;NAGGING

## 2022-09-06 ASSESSMENT — PAIN SCALES - GENERAL
PAINLEVEL_OUTOF10: 4
PAINLEVEL_OUTOF10: 4
PAINLEVEL_OUTOF10: 3

## 2022-09-06 ASSESSMENT — PAIN DESCRIPTION - LOCATION
LOCATION: CHEST;RIB CAGE
LOCATION: CHEST
LOCATION: CHEST;RIB CAGE

## 2022-09-06 ASSESSMENT — PAIN DESCRIPTION - ORIENTATION
ORIENTATION: MID;RIGHT
ORIENTATION: RIGHT
ORIENTATION: MID

## 2022-09-06 NOTE — PROGRESS NOTES
Pt is in room lying in bed resting after just going to the restroom via standby assist. Pt stated that her pain has been more stable and manageable. No signs of distress noted.
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
## 2022-09-06 NOTE — PLAN OF CARE
Problem: Safety - Adult  Goal: Free from fall injury  Outcome: Progressing     Problem: Skin/Tissue Integrity  Goal: Absence of new skin breakdown  Description: 1. Monitor for areas of redness and/or skin breakdown  2. Assess vascular access sites hourly  3. Every 4-6 hours minimum:  Change oxygen saturation probe site  4. Every 4-6 hours:  If on nasal continuous positive airway pressure, respiratory therapy assess nares and determine need for appliance change or resting period.   Outcome: Progressing     Problem: ABCDS Injury Assessment  Goal: Absence of physical injury  Outcome: Progressing     Problem: Respiratory - Adult  Goal: Achieves optimal ventilation and oxygenation  Outcome: Progressing     Problem: Pain  Goal: Verbalizes/displays adequate comfort level or baseline comfort level  Outcome: Progressing
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
## 2022-09-11 DIAGNOSIS — G44.209 TENSION VASCULAR HEADACHE: ICD-10-CM

## 2022-09-11 DIAGNOSIS — G43.809 MIGRAINE VARIANT WITH HEADACHE: Chronic | ICD-10-CM

## 2022-09-12 RX ORDER — TOPIRAMATE 25 MG/1
TABLET ORAL
Qty: 124 TABLET | Refills: 0 | Status: SHIPPED
Start: 2022-09-12 | End: 2022-10-11 | Stop reason: SDUPTHER

## 2022-09-13 ENCOUNTER — OFFICE VISIT (OUTPATIENT)
Dept: SURGERY | Age: 62
End: 2022-09-13
Payer: COMMERCIAL

## 2022-09-13 VITALS
DIASTOLIC BLOOD PRESSURE: 86 MMHG | HEIGHT: 62 IN | RESPIRATION RATE: 16 BRPM | BODY MASS INDEX: 38.28 KG/M2 | HEART RATE: 75 BPM | WEIGHT: 208 LBS | SYSTOLIC BLOOD PRESSURE: 158 MMHG | OXYGEN SATURATION: 99 %

## 2022-09-13 DIAGNOSIS — N64.89 HEMATOMA OF RIGHT BREAST: ICD-10-CM

## 2022-09-13 DIAGNOSIS — T14.90XA TRAUMA: Primary | ICD-10-CM

## 2022-09-13 DIAGNOSIS — E66.8 MODERATE OBESITY: ICD-10-CM

## 2022-09-13 DIAGNOSIS — S20.211D CONTUSION OF RIGHT CHEST WALL, SUBSEQUENT ENCOUNTER: ICD-10-CM

## 2022-09-13 PROCEDURE — 99212 OFFICE O/P EST SF 10 MIN: CPT | Performed by: NURSE PRACTITIONER

## 2022-09-13 PROCEDURE — 99213 OFFICE O/P EST LOW 20 MIN: CPT | Performed by: NURSE PRACTITIONER

## 2022-09-13 RX ORDER — OXYCODONE HYDROCHLORIDE 5 MG/1
5 TABLET ORAL EVERY 12 HOURS PRN
Qty: 14 TABLET | Refills: 0 | Status: SHIPPED | OUTPATIENT
Start: 2022-09-13 | End: 2022-09-20

## 2022-09-13 RX ORDER — GABAPENTIN 300 MG/1
300 CAPSULE ORAL 2 TIMES DAILY
Qty: 20 CAPSULE | Refills: 0 | Status: SHIPPED | OUTPATIENT
Start: 2022-09-13 | End: 2022-09-23

## 2022-09-13 RX ORDER — METHOCARBAMOL 500 MG/1
1000 TABLET, FILM COATED ORAL 4 TIMES DAILY
Qty: 80 TABLET | Refills: 0 | Status: SHIPPED
Start: 2022-09-13 | End: 2022-10-21 | Stop reason: SDUPTHER
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
## 2022-09-13 NOTE — PATIENT INSTRUCTIONS
[place] [address]  Trauma Services  Additional Discharge Instructions    Call [phone] for any questions/concerns. Please follow the instructions checked below:  Please follow up with your primary care provider. ACTIVITY INSTRUCTIONS  Increase activity as tolerated  No heavy lifting or strenuous activity  Take your incentive spirometer home and use 4-6 times/day   [x]  No driving until cleared by Trauma    WOUND/DRESSING INSTRUCTIONS:  You may shower. No sitting in bath tub, hot tub or swimming until cleared by physician. Ice to areas of pain for first 24 hours. Heat to areas of pain after that. MEDICATION INSTRUCTIONS  Take medication as prescribed. When taking pain medications, you may experience dizziness or drowsiness. Do not drink alcohol or drive when taking these medications. You may experience constipation while taking pain medication. You may take over the counter stool softeners such as docusate (Colace), sennosides S (Senokot-S), or Miralax. [x]  You may take acetaminophen (Tylenol) products. Do NOT take more than 4000mg of Tylenol in 24h. OPIOID MEDICATION INSTRUCTIONS  Read the medication guide that is included with your prescription. Take your medication exactly as prescribed. Store medication away from children and in a safe place. Do NOT share your medication with others. Do NOT take medication unless it is prescribed for you. Do NOT drink alcohol while taking opioids (I.e., Norco, Percocet, Oxycodone, etc). Discuss with the Trauma Clinic staff if the dose of medication you are taking does not control your pain and any side effects that you may be having. CALL 911 OR YOUR LOCAL EMERGENCY SERVICE:  --If you take too much medication  --If you have trouble breathing or shortness of breath  --A child has taken this medication.     WORK:  You may not return to work until you receive follow-up with the Trauma Clinic or clearance by all consultants. Call the trauma clinic for any of the following or for questions/concerns;  --fever over 101F  --redness, swelling, hardness or warmth at the wound site(s). --Unrelieved nausea/vomiting  --Foul smelling or cloudy drainage at the wound site(s)  --Unrelieved pain or increase in pain  --Increase in shortness of breath    Follow-up:  Trauma Clinic: [phone] option [address]  [address]    Your ribs are curved bones in your chest that protect inner structures like your lungs and heart. The ribs expand and contract when you breathe. Pain can result making it hard to cough or breathe deeply. The difficulty increases if several ribs are broken on both sides. You are likely to heal in 6 to 8 weeks, but may take longer if you are elderly. Most rib fractures heal on their own with no lasting effects. Treatment:   Take the medications given to you as prescribed. Your pain may not go completely away after discharge from the hospital, but we want your pain tolerable so you can take deep breaths. As your pain becomes controlled you may switch to taking over-the-counter medications such as Tylenol and or Ibuprofen as directed. Tylenol (acetaminophen) 1000mg every 6 hours or you may take 650mg every 4 hours. Ibuprofen up to 800mg every 8 hours. (Please take with food or milk). Over the counter creams and patches such as Salon Pas, [employer], Aspercream, can be useful as well. You were likely given a breathing device called an incentive spirometer while in the hospital to practice deep breathing. It is advised to continue this several times a day while at home to prevent pneumonia. Prevent Complications:  Try to take 5-10 deep breaths every hour while awake. Use the incentive spirometer often.   Set goals of deeper breathing every couple of days until reaching normal adult level of about 2500 ml on the printed scale. Activity:  Avoid further chest injury. Plan quiet activity for the first few days. Do not stay in bed. Walk around and move. No rough activities with family, friends or pets. No sports, jumping, jogging or gymnastics. Ask your doctor or the trauma clinic when you will be able to resume these activities. Symptoms to Report:  Call your doctor right away if you notice any of these symptoms:   Increased chest pain  Shortness of breath  Fever  Coughing up blood    Call 911 immediately if these symptoms are severe.     Follow-up:  Trauma Clinic: [phone] [address]  [facility], [address] [place]
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
## 2022-09-16 ENCOUNTER — TELEPHONE (OUTPATIENT)
Dept: BREAST CENTER | Age: 62
End: 2022-09-16

## 2022-09-16 NOTE — TELEPHONE ENCOUNTER
Patient is a new referral to the breast clinic. Left message for patient to return call at [phone] to discuss referral information and schedule an appointment in the breast clinic. NEW PATIENT: possible breast hematoma. Will determine if patient has had prior imaging. She may need imaging at this time.  Referring: KASSANDRA Simmons
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
## 2022-09-20 ENCOUNTER — OFFICE VISIT (OUTPATIENT)
Dept: SURGERY | Age: 62
End: 2022-09-20
Payer: COMMERCIAL

## 2022-09-20 VITALS
DIASTOLIC BLOOD PRESSURE: 95 MMHG | TEMPERATURE: 96.8 F | BODY MASS INDEX: 37.36 KG/M2 | RESPIRATION RATE: 15 BRPM | OXYGEN SATURATION: 97 % | WEIGHT: 203 LBS | HEIGHT: 62 IN | SYSTOLIC BLOOD PRESSURE: 186 MMHG | HEART RATE: 90 BPM
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
## 2022-09-20 PROCEDURE — 99212 OFFICE O/P EST SF 10 MIN: CPT | Performed by: NURSE PRACTITIONER

## 2022-09-20 PROCEDURE — 99213 OFFICE O/P EST LOW 20 MIN: CPT | Performed by: NURSE PRACTITIONER
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
## 2022-10-05 ENCOUNTER — TELEPHONE (OUTPATIENT)
Dept: SURGERY | Age: 62
End: 2022-10-05

## 2022-10-05 NOTE — TELEPHONE ENCOUNTER
LPN took call from patient stating she is feeling better. Patient states she is ready to return to work around 10/17 which will be 6-8 weeks from [address].  Routing to trauma APN on whether they would like to see patient in office for return to work eval.

## 2022-10-07 ENCOUNTER — TELEPHONE (OUTPATIENT)
Dept: SURGERY | Age: 62
End: 2022-10-07

## 2022-10-07 NOTE — TELEPHONE ENCOUNTER
LPN spoke with this patient, regarding going back to work. Per APRN's, patient can return to work. Patient to call clinic if release/return to work letter is to be faxed. Patient verbalized understanding.    Electronically signed by Leora Ludwig LPN on 17/4/72 at 72:14 AM EDT

## 2022-10-10 ENCOUNTER — TELEPHONE (OUTPATIENT)
Dept: BREAST CENTER | Age: 62
End: 2022-10-10

## 2022-10-10 NOTE — TELEPHONE ENCOUNTER
Followed up with patient to see how she is doing. She has been referred to our office (breast clinic). She states she is still pretty sore. She would like to wait a while longer before coming in. I offered a clinical follow up in the meantime if she would like to simply have it checked without any diagnostic imaging. She declined but was thankful for the follow up. She is aware to call us when she is ready to schedule.

## 2022-10-11 DIAGNOSIS — G44.209 TENSION VASCULAR HEADACHE: ICD-10-CM

## 2022-10-11 DIAGNOSIS — G43.809 MIGRAINE VARIANT WITH HEADACHE: Chronic | ICD-10-CM

## 2022-10-11 RX ORDER — TOPIRAMATE 25 MG/1
25 TABLET ORAL 2 TIMES DAILY
Qty: 60 TABLET | Refills: 2 | Status: SHIPPED | OUTPATIENT
Start: 2022-10-11 | End: 2023-01-09

## 2022-10-12 DIAGNOSIS — E03.9 ACQUIRED HYPOTHYROIDISM: Chronic | ICD-10-CM

## 2022-10-12 DIAGNOSIS — I10 ESSENTIAL HYPERTENSION: Chronic | ICD-10-CM

## 2022-10-12 DIAGNOSIS — E11.9 DIET-CONTROLLED DIABETES MELLITUS (HCC): ICD-10-CM

## 2022-10-12 DIAGNOSIS — M81.0 AGE-RELATED OSTEOPOROSIS WITHOUT CURRENT PATHOLOGICAL FRACTURE: ICD-10-CM

## 2022-10-12 DIAGNOSIS — Z00.01 ENCOUNTER FOR ANNUAL GENERAL MEDICAL EXAMINATION WITH ABNORMAL FINDINGS IN ADULT: ICD-10-CM

## 2022-10-12 LAB
ALBUMIN SERPL-MCNC: 4.1 G/DL (ref 3.5–5.2)
ALP BLD-CCNC: 77 U/L (ref 35–104)
ALT SERPL-CCNC: 13 U/L (ref 0–32)
ANION GAP SERPL CALCULATED.3IONS-SCNC: 12 MMOL/L (ref 7–16)
AST SERPL-CCNC: 19 U/L (ref 0–31)
BACTERIA: ABNORMAL /HPF
BASOPHILS ABSOLUTE: 0.12 E9/L (ref 0–0.2)
BASOPHILS RELATIVE PERCENT: 1.2 % (ref 0–2)
BILIRUB SERPL-MCNC: 0.4 MG/DL (ref 0–1.2)
BILIRUBIN URINE: NEGATIVE
BLOOD, URINE: ABNORMAL
BUN BLDV-MCNC: 9 MG/DL (ref 6–20)
CALCIUM SERPL-MCNC: 10.9 MG/DL (ref 8.6–10.2)
CHLORIDE BLD-SCNC: 103 MMOL/L (ref 98–107)
CHOLESTEROL, TOTAL: 185 MG/DL (ref 0–199)
CLARITY: CLEAR
CO2: 21 MMOL/L (ref 22–29)
COLOR: YELLOW
CREAT SERPL-MCNC: 0.7 MG/DL (ref 0.5–1)
EOSINOPHILS ABSOLUTE: 0.46 E9/L (ref 0.05–0.5)
EOSINOPHILS RELATIVE PERCENT: 4.7 % (ref 0–6)
GFR AFRICAN AMERICAN: >60
GFR NON-AFRICAN AMERICAN: >60 ML/MIN/1.73
GLUCOSE BLD-MCNC: 94 MG/DL (ref 74–99)
GLUCOSE URINE: NEGATIVE MG/DL
HBA1C MFR BLD: 5.4 % (ref 4–5.6)
HCT VFR BLD CALC: 44.5 % (ref 34–48)
HDLC SERPL-MCNC: 52 MG/DL
HEMOGLOBIN: 13.3 G/DL (ref 11.5–15.5)
IMMATURE GRANULOCYTES #: 0.04 E9/L
IMMATURE GRANULOCYTES %: 0.4 % (ref 0–5)
KETONES, URINE: NEGATIVE MG/DL
LDL CHOLESTEROL CALCULATED: 99 MG/DL (ref 0–99)
LEUKOCYTE ESTERASE, URINE: ABNORMAL
LYMPHOCYTES ABSOLUTE: 2.13 E9/L (ref 1.5–4)
LYMPHOCYTES RELATIVE PERCENT: 21.6 % (ref 20–42)
MAGNESIUM: 2.3 MG/DL (ref 1.6–2.6)
MCH RBC QN AUTO: 27.5 PG (ref 26–35)
MCHC RBC AUTO-ENTMCNC: 29.9 % (ref 32–34.5)
MCV RBC AUTO: 92.1 FL (ref 80–99.9)
MONOCYTES ABSOLUTE: 0.74 E9/L (ref 0.1–0.95)
MONOCYTES RELATIVE PERCENT: 7.5 % (ref 2–12)
NEUTROPHILS ABSOLUTE: 6.36 E9/L (ref 1.8–7.3)
NEUTROPHILS RELATIVE PERCENT: 64.6 % (ref 43–80)
NITRITE, URINE: NEGATIVE
PDW BLD-RTO: 17.2 FL (ref 11.5–15)
PH UA: 6 (ref 5–9)
PLATELET # BLD: 391 E9/L (ref 130–450)
PMV BLD AUTO: 11.1 FL (ref 7–12)
POTASSIUM SERPL-SCNC: 4.2 MMOL/L (ref 3.5–5)
PROTEIN UA: >=300 MG/DL
RBC # BLD: 4.83 E12/L (ref 3.5–5.5)
RBC UA: >20 /HPF (ref 0–2)
SODIUM BLD-SCNC: 136 MMOL/L (ref 132–146)
SPECIFIC GRAVITY UA: 1.02 (ref 1–1.03)
T4 TOTAL: 8.7 MCG/DL (ref 4.5–11.7)
TOTAL PROTEIN: 8 G/DL (ref 6.4–8.3)
TRIGL SERPL-MCNC: 171 MG/DL (ref 0–149)
TSH SERPL DL<=0.05 MIU/L-ACNC: 1.54 UIU/ML (ref 0.27–4.2)
UROBILINOGEN, URINE: 0.2 E.U./DL
VITAMIN D 25-HYDROXY: 47 NG/ML (ref 30–100)
VLDLC SERPL CALC-MCNC: 34 MG/DL
WBC # BLD: 9.9 E9/L (ref 4.5–11.5)
WBC UA: >20 /HPF (ref 0–5)

## 2022-10-12 NOTE — TELEPHONE ENCOUNTER
Last ov note, labs, and xrays faxed to [facility] per request.  Pt in office now

## 2022-10-14 ENCOUNTER — OFFICE VISIT (OUTPATIENT)
Dept: FAMILY MEDICINE CLINIC | Age: 62
End: 2022-10-14
Payer: COMMERCIAL

## 2022-10-14 VITALS
DIASTOLIC BLOOD PRESSURE: 70 MMHG | SYSTOLIC BLOOD PRESSURE: 128 MMHG | BODY MASS INDEX: 37.36 KG/M2 | WEIGHT: 203 LBS | TEMPERATURE: 98.5 F | HEART RATE: 85 BPM | HEIGHT: 62 IN | OXYGEN SATURATION: 98 %

## 2022-10-14 DIAGNOSIS — R35.0 URINARY FREQUENCY: ICD-10-CM

## 2022-10-14 DIAGNOSIS — R31.9 URINARY TRACT INFECTION WITH HEMATURIA, SITE UNSPECIFIED: Primary | ICD-10-CM

## 2022-10-14 DIAGNOSIS — N39.0 URINARY TRACT INFECTION WITH HEMATURIA, SITE UNSPECIFIED: Primary | ICD-10-CM

## 2022-10-14 LAB
BILIRUBIN, POC: NORMAL
BLOOD URINE, POC: NORMAL
CLARITY, POC: NORMAL
COLOR, POC: YELLOW
GLUCOSE URINE, POC: NORMAL
KETONES, POC: NORMAL
LEUKOCYTE EST, POC: NORMAL
NITRITE, POC: NORMAL
PH, POC: 6
PROTEIN, POC: 100
SPECIFIC GRAVITY, POC: >=1.03
UROBILINOGEN, POC: 0.2

## 2022-10-14 PROCEDURE — G8484 FLU IMMUNIZE NO ADMIN: HCPCS | Performed by: PHYSICIAN ASSISTANT

## 2022-10-14 PROCEDURE — 3017F COLORECTAL CA SCREEN DOC REV: CPT | Performed by: PHYSICIAN ASSISTANT

## 2022-10-14 PROCEDURE — G8417 CALC BMI ABV UP PARAM F/U: HCPCS | Performed by: PHYSICIAN ASSISTANT

## 2022-10-14 PROCEDURE — 1036F TOBACCO NON-USER: CPT | Performed by: PHYSICIAN ASSISTANT

## 2022-10-14 PROCEDURE — 81002 URINALYSIS NONAUTO W/O SCOPE: CPT | Performed by: PHYSICIAN ASSISTANT

## 2022-10-14 PROCEDURE — 99214 OFFICE O/P EST MOD 30 MIN: CPT | Performed by: PHYSICIAN ASSISTANT

## 2022-10-14 PROCEDURE — G8427 DOCREV CUR MEDS BY ELIG CLIN: HCPCS | Performed by: PHYSICIAN ASSISTANT

## 2022-10-14 RX ORDER — CIPROFLOXACIN 500 MG/1
500 TABLET, FILM COATED ORAL 2 TIMES DAILY
Qty: 14 TABLET | Refills: 0 | Status: SHIPPED | OUTPATIENT
Start: 2022-10-14 | End: 2022-10-21

## 2022-10-14 NOTE — PROGRESS NOTES
10/14/22  Cedric Gibbons : 1960 Sex: female  Age 63 y.o. Subjective:  Chief Complaint   Patient presents with    Urinary Tract Infection     Started about a week ago. HPI:   Cedric Gibbons , 63 y.o. female presents to express care for evaluation of urinary tract infection    HPI  79-year-old female presents to express care for evaluation of possible urinary tract infection. The patient has had the symptoms ongoing for about a week. The patient does not describe any significant burning with urination but she has pain when she urinates. The patient is not having any blood in the urine. The patient denies fever, chills, nausea, vomiting. No significant back pain. No flank pain. The patient is having some suprapubic discomfort. The patient does have a longstanding history of UTIs. ROS:   Unless otherwise stated in this report the patient's positive and negative responses for review of systems for constitutional, eyes, ENT, cardiovascular, respiratory, gastrointestinal, neurological, , musculoskeletal, and integument systems and related systems to the presenting problem are either stated in the history of present illness or were not pertinent or were negative for the symptoms and/or complaints related to the presenting medical problem. Positives and pertinent negatives as per HPI. All others reviewed and are negative.       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    Altered bowel elimination due to intestinal ostomy ([facility] [address]     Anemia     Arthritis     Crohn disease ([facility] [address]     Fatty liver     Hernia     History of DVT (deep vein thrombosis) 6/10/2015    History of pulmonary embolus (PE) 6/10/2015    Hyperlipemia     Hypertension     Hypothyroidism     Intervertebral lumbar disc disorder with myelopathy, lumbar region     Leukocytosis     Migraine variant with headache 2022    Movement disorder     MRSA infection     UTI Obesity     Osteoarthritis of both knees     Palpitations     PE (pulmonary embolism)     Rash     fine skin rash not itchy gastro dr aware    Subclavian vein stenosis     Syncope     Thyroid disease     Uterine fibroid     Vitamin D deficiency        Past Surgical History:   Procedure Laterality Date    ABDOMEN SURGERY  11/2/14    I&d abdominal wound    CARPAL TUNNEL RELEASE      CHOLECYSTECTOMY      COLECTOMY      ostomy    COLECTOMY      COLONOSCOPY      ENDOMETRIAL ABLATION      GASTROSTOMY TUBE PLACEMENT      HERNIA REPAIR      ILEOSTOMY OR JEJUNOSTOMY      placed 2/2015 reversed 9/2015    JOINT REPLACEMENT  11/2013    both knees    TONSILLECTOMY      TOTAL KNEE ARTHROPLASTY Bilateral 2013    TUBAL LIGATION      TUNNELED VENOUS CATHETER PLACEMENT         Family History   Problem Relation Age of Onset    Hypertension Mother        Medications:     Current Outpatient Medications:     ciprofloxacin (CIPRO) 500 MG tablet, Take 1 tablet by mouth 2 times daily for 7 days, Disp: 14 tablet, Rfl: 0    topiramate (TOPAMAX) 25 MG tablet, Take 1 tablet by mouth 2 times daily Indications: Chronic migraine headaches, Disp: 60 tablet, Rfl: 2    levothyroxine (SYNTHROID) 125 MCG tablet, Take 125 mcg by mouth Daily, Disp: , Rfl:     DULoxetine (CYMBALTA) 60 MG extended release capsule, Take 60 mg by mouth daily, Disp: , Rfl:     folic acid (FOLVITE) 1 MG tablet, Take 1 mg by mouth 2 times daily, Disp: , Rfl:     losartan (COZAAR) 100 MG tablet, Take 100 mg by mouth daily, Disp: , Rfl:     Methotrexate, Anti-Rheumatic, (METHOTREXATE SC), Inject 25 mg into the skin once a week Given Saturday, Disp: , Rfl:     potassium chloride (KLOR-CON M) 20 MEQ extended release tablet, Take 20 mEq by mouth 2 times daily, Disp: , Rfl:     topiramate (TOPAMAX) 25 MG tablet, Take 50 mg by mouth 2 times daily, Disp: , Rfl:     ustekinumab (STELARA) 90 MG/ML SOSY prefilled syringe, Inject 90 mg into the skin Every 2 months, Disp: , Rfl: azelastine (ASTELIN) 0.1 % nasal spray, 1-2 sprays by Nasal route 2 times daily as needed for Rhinitis Use in each nostril as directed, Disp: 30 mL, Rfl: 1    hydroCHLOROthiazide (HYDRODIURIL) 25 MG tablet, Take 1 tablet by mouth every morning, Disp: 90 tablet, Rfl: 5    zinc gluconate 50 MG tablet, Take 50 mg by mouth daily, Disp: , Rfl:     magnesium (MAGNESIUM-OXIDE) 250 MG TABS tablet, Take 250 mg by mouth daily, Disp: , Rfl:     traZODone (DESYREL) 50 MG tablet, Take 2 tablets by mouth nightly, Disp: 180 tablet, Rfl: 3    metoprolol tartrate (LOPRESSOR) 25 MG tablet, Take 1 tablet by mouth 2 times daily, Disp: 180 tablet, Rfl: 3    potassium chloride (KLOR-CON M) 20 MEQ extended release tablet, Take 1 tablet by mouth 2 times daily, Disp: 180 tablet, Rfl: 3    losartan (COZAAR) 100 MG tablet, Take 1 tablet by mouth daily, Disp: 90 tablet, Rfl: 3    ascorbic acid (VITAMIN C) 100 MG tablet, , Disp: , Rfl:     Biotin 5 MG CAPS, Take 5 mg by mouth daily, Disp: , Rfl:     L-Lysine 1000 MG TABS, Take 1,000 mg by mouth daily, Disp: , Rfl:     methotrexate Sodium (RHEUMATREX) 50 MG/2ML chemo injection, INJECT 1 ML (25 MG) SUBCUTANEOUSLY ONCE A WEEK, Disp: , Rfl:     omeprazole (PRILOSEC) 20 MG delayed release capsule, Take 20 mg by mouth daily, Disp: , Rfl:     thiamine 100 MG tablet, , Disp: , Rfl:     STELARA 90 MG/ML SOSY prefilled syringe, , Disp: , Rfl:     levothyroxine (SYNTHROID) 125 MCG tablet, Take 1 tablet by mouth daily, Disp: 90 tablet, Rfl: 3    DULoxetine (CYMBALTA) 30 MG extended release capsule, Take 30 mg by mouth daily, Disp: , Rfl:     sulconazole (EXELDERM) 1 % cream, Apply topically bid, Disp: 1 Tube, Rfl: 12    oxiconazole nitrate (OXISTAT) 1 % CREA cream, Bid to breast fold, Disp: 1 Tube, Rfl: 12    Methotrexate, Anti-Rheumatic, (METHOTREXATE, PF, SC), Inject 1 mL into the skin once a week, Disp: , Rfl:     Cholecalciferol (VITAMIN D3) 2000 UNITS CAPS, Take 3,000 Int'l Units by mouth daily. , Disp: , Rfl:     gabapentin (NEURONTIN) 300 MG capsule, Take 1 capsule by mouth 2 times daily for 10 days. , Disp: 20 capsule, Rfl: 0    Allergies: Allergies   Allergen Reactions    Amoxicillin-Pot Clavulanate Rash    Biaxin [Clarithromycin] Hives and Rash    Cefaclor Hives and Rash     rash    Clavulanic Acid Rash    Doxycycline Rash    Macrobid [Nitrofurantoin] Nausea And Vomiting       Social History:     Social History     Tobacco Use    Smoking status: Never    Smokeless tobacco: Never   Vaping Use    Vaping Use: Never used   Substance Use Topics    Alcohol use: Not Currently    Drug use: Never       Patient lives at home. Physical Exam:     Vitals:    10/14/22 1434   BP: 128/70   Pulse: 85   Temp: 98.5 °F (36.9 °C)   SpO2: 98%   Weight: 203 lb (92.1 kg)   Height: 5' 2\" (1.575 m)       Exam:  Physical Exam  Nurses note and vital signs reviewed and patient is not hypoxic. General: The patient appears well and in no apparent distress. Patient is resting comfortably on cart. Skin: Warm, dry, no pallor noted. There is no rash noted. Head: Normocephalic, atraumatic. Eye: Normal conjunctiva  Ears, Nose, Mouth, and Throat: Oral mucosa is moist  Cardiovascular: Regular Rate and Rhythm  Respiratory: Patient is in no distress, no accessory muscle use, lungs are clear to auscultation, no wheezing, crackles or rhonchi  Back: Non-tender, no CVA tenderness bilaterally to percussion. GI: Normal bowel sounds, minimal suprapubic tenderness to palpation, no masses appreciated. No rebound, guarding, or rigidity noted.   Neurological: A&O x4, normal speech        Testing:     Results for orders placed or performed in visit on 10/14/22   POCT Urinalysis no Micro   Result Value Ref Range    Color, UA yellow     Clarity, UA      Glucose, UA POC neg     Bilirubin, UA neg     Ketones, UA neg     Spec Grav, UA >=1.030     Blood, UA POC moderate     pH, UA 6.0     Protein, UA      Urobilinogen, UA 0.2     Leukocytes, UA small     Nitrite, UA neg            Medical Decision Making:     Vital signs reviewed    Past medical history reviewed. Allergies reviewed. Medications reviewed. Patient on arrival does not appear to be in any apparent distress or discomfort. The patient has been seen and evaluated. The patient does not appear to be toxic or lethargic. The patient had urinalysis obtained that did show evidence of moderate blood, small leukocytes. There was protein present. The patient had a urine culture set up. The patient will be treated with Cipro. Her previous urine culture was resistant to Bactrim and the patient is allergic to Macrobid, penicillin, cephalosporins    Urine culture pending at this time. We will update the patient with the results and make any necessary changes to her antibiotic regimen. The patient is to return to express care or go directly to the emergency department should any of the signs or symptoms worsen. The patient is to followup with primary care physician in 2-3 days for repeat evaluation. The patient has no other questions or concerns at this time the patient will be discharged home. Clinical Impression:   Karishma Cleary was seen today for urinary tract infection. Diagnoses and all orders for this visit:    Urinary tract infection with hematuria, site unspecified    Urinary frequency  -     POCT Urinalysis no Micro  -     Culture, Urine; Future    Other orders  -     ciprofloxacin (CIPRO) 500 MG tablet; Take 1 tablet by mouth 2 times daily for 7 days      The patient is to call for any concerns or return if any of the signs or symptoms worsen. The patient is to follow-up with PCP in the next 2-3 days for repeat evaluation repeat assessment or go directly to the emergency department.      SIGNATURE: Kelvin Thompson III, PA-C

## 2022-10-17 LAB
ORGANISM: ABNORMAL
URINE CULTURE, ROUTINE: ABNORMAL

## 2022-10-17 NOTE — RESULT ENCOUNTER NOTE
Urine culture did show evidence of E. coli. It is sensitive to fluoroquinolones. If the patient is not improving may need to switch antibiotic. Please let us know otherwise continue with antibiotic and continue to hydrate.   Follow-up with PCP for repeat urinalysis

## 2022-10-21 ENCOUNTER — OFFICE VISIT (OUTPATIENT)
Dept: PRIMARY CARE CLINIC | Age: 62
End: 2022-10-21
Payer: COMMERCIAL

## 2022-10-21 VITALS
DIASTOLIC BLOOD PRESSURE: 83 MMHG | HEIGHT: 62 IN | TEMPERATURE: 98.5 F | BODY MASS INDEX: 37.39 KG/M2 | WEIGHT: 203.2 LBS | SYSTOLIC BLOOD PRESSURE: 128 MMHG

## 2022-10-21 DIAGNOSIS — D51.8 VITAMIN B12 DEFICIENCY (DIETARY) ANEMIA: ICD-10-CM

## 2022-10-21 DIAGNOSIS — I10 ESSENTIAL HYPERTENSION: Chronic | ICD-10-CM

## 2022-10-21 DIAGNOSIS — M81.0 AGE-RELATED OSTEOPOROSIS WITHOUT CURRENT PATHOLOGICAL FRACTURE: ICD-10-CM

## 2022-10-21 DIAGNOSIS — Z00.01 ENCOUNTER FOR ANNUAL GENERAL MEDICAL EXAMINATION WITH ABNORMAL FINDINGS IN ADULT: Primary | ICD-10-CM

## 2022-10-21 DIAGNOSIS — N39.46 MIXED STRESS AND URGE URINARY INCONTINENCE: ICD-10-CM

## 2022-10-21 DIAGNOSIS — E11.9 DIET-CONTROLLED DIABETES MELLITUS (HCC): ICD-10-CM

## 2022-10-21 DIAGNOSIS — M15.9 PRIMARY OSTEOARTHRITIS INVOLVING MULTIPLE JOINTS: ICD-10-CM

## 2022-10-21 DIAGNOSIS — E03.9 ACQUIRED HYPOTHYROIDISM: Chronic | ICD-10-CM

## 2022-10-21 PROCEDURE — G8484 FLU IMMUNIZE NO ADMIN: HCPCS | Performed by: FAMILY MEDICINE

## 2022-10-21 PROCEDURE — 99396 PREV VISIT EST AGE 40-64: CPT | Performed by: FAMILY MEDICINE

## 2022-10-21 RX ORDER — LEVOTHYROXINE SODIUM 0.12 MG/1
125 TABLET ORAL DAILY
Qty: 90 TABLET | Refills: 3 | Status: SHIPPED | OUTPATIENT
Start: 2022-10-21

## 2022-10-21 RX ORDER — METHOCARBAMOL 500 MG/1
500 TABLET, FILM COATED ORAL NIGHTLY PRN
Qty: 60 TABLET | Refills: 2 | Status: SHIPPED | OUTPATIENT
Start: 2022-10-21

## 2022-10-21 ASSESSMENT — PATIENT HEALTH QUESTIONNAIRE - PHQ9
SUM OF ALL RESPONSES TO PHQ QUESTIONS 1-9: 0
1. LITTLE INTEREST OR PLEASURE IN DOING THINGS: 0
SUM OF ALL RESPONSES TO PHQ QUESTIONS 1-9: 0
SUM OF ALL RESPONSES TO PHQ QUESTIONS 1-9: 0
SUM OF ALL RESPONSES TO PHQ9 QUESTIONS 1 & 2: 0
2. FEELING DOWN, DEPRESSED OR HOPELESS: 0
SUM OF ALL RESPONSES TO PHQ QUESTIONS 1-9: 0

## 2022-10-21 ASSESSMENT — ENCOUNTER SYMPTOMS
GASTROINTESTINAL NEGATIVE: 1
ALLERGIC/IMMUNOLOGIC NEGATIVE: 1
EYES NEGATIVE: 1
RESPIRATORY NEGATIVE: 1
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
## 2022-10-24 ENCOUNTER — TELEPHONE (OUTPATIENT)
Dept: BREAST CENTER | Age: 62
End: 2022-10-24
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
## 2022-11-21 ENCOUNTER — TELEPHONE (OUTPATIENT)
Dept: BREAST CENTER | Age: 62
End: 2022-11-21

## 2022-11-21 NOTE — TELEPHONE ENCOUNTER
Contacted patient in reference to her referral to the breast clinic and if she would like to be evaluated. She states she is overall feeling better but does still have some ongoing soreness in her ribs. She initially showed interest with coming in but changed her mind and would rather follow up with her new GYN instead. I encouraged her to call us if she changes her mind or needs anything. Will forward to referring office of [facility].

## 2022-12-21 ENCOUNTER — COMMUNITY OUTREACH (OUTPATIENT)
Dept: PRIMARY CARE CLINIC | Age: 62
End: 2022-12-21

## 2022-12-26 RX ORDER — TOPIRAMATE 25 MG/1
50 TABLET ORAL 2 TIMES DAILY
Qty: 360 TABLET | Refills: 5 | Status: SHIPPED | OUTPATIENT
Start: 2022-12-26

## 2022-12-26 RX ORDER — ERGOCALCIFEROL 1.25 MG/1
CAPSULE ORAL
Qty: 26 CAPSULE | Refills: 5 | Status: SHIPPED | OUTPATIENT
Start: 2022-12-26

## 2022-12-26 RX ORDER — TRAZODONE HYDROCHLORIDE 50 MG/1
100 TABLET ORAL NIGHTLY
Qty: 180 TABLET | Refills: 3 | Status: SHIPPED | OUTPATIENT
Start: 2022-12-26

## 2022-12-26 RX ORDER — FOLIC ACID 1 MG/1
1 TABLET ORAL DAILY
Qty: 90 TABLET | Refills: 5 | Status: SHIPPED | OUTPATIENT
Start: 2022-12-26

## 2022-12-26 RX ORDER — LOSARTAN POTASSIUM 100 MG/1
100 TABLET ORAL DAILY
Qty: 90 TABLET | Refills: 3 | Status: SHIPPED | OUTPATIENT
Start: 2022-12-26

## 2022-12-26 RX ORDER — POTASSIUM CHLORIDE 20 MEQ/1
20 TABLET, EXTENDED RELEASE ORAL 2 TIMES DAILY
Qty: 180 TABLET | Refills: 3 | Status: SHIPPED | OUTPATIENT
Start: 2022-12-26

## 2022-12-26 RX ORDER — DULOXETIN HYDROCHLORIDE 60 MG/1
60 CAPSULE, DELAYED RELEASE ORAL DAILY
Qty: 90 CAPSULE | Refills: 5 | Status: SHIPPED | OUTPATIENT
Start: 2022-12-26

## 2022-12-26 RX ORDER — CETIRIZINE HYDROCHLORIDE 10 MG/1
10 TABLET ORAL DAILY
Qty: 90 TABLET | Refills: 5 | Status: SHIPPED | OUTPATIENT
Start: 2022-12-26

## 2022-12-26 RX ORDER — CARVEDILOL 3.12 MG/1
TABLET ORAL
Qty: 360 TABLET | Refills: 3 | Status: SHIPPED | OUTPATIENT
Start: 2022-12-26

## 2023-01-16 ENCOUNTER — OFFICE VISIT (OUTPATIENT)
Dept: RHEUMATOLOGY | Age: 63
End: 2023-01-16

## 2023-01-16 ENCOUNTER — HOSPITAL ENCOUNTER (OUTPATIENT)
Dept: GENERAL RADIOLOGY | Age: 63
Discharge: HOME | End: 2023-01-18
Payer: COMMERCIAL

## 2023-01-16 ENCOUNTER — HOSPITAL ENCOUNTER (OUTPATIENT)
Age: 63
Discharge: HOME | End: 2023-01-18
Payer: COMMERCIAL

## 2023-01-16 VITALS
BODY MASS INDEX: 36.32 KG/M2 | DIASTOLIC BLOOD PRESSURE: 80 MMHG | HEIGHT: 63 IN | WEIGHT: 205 LBS | OXYGEN SATURATION: 99 % | RESPIRATION RATE: 18 BRPM | SYSTOLIC BLOOD PRESSURE: 134 MMHG | HEART RATE: 74 BPM

## 2023-01-16 DIAGNOSIS — M07.60 CROHN'S RELATED ARTHRITIS (HCC): ICD-10-CM

## 2023-01-16 DIAGNOSIS — M12.812 ROTATOR CUFF ARTHROPATHY OF BOTH SHOULDERS: ICD-10-CM

## 2023-01-16 DIAGNOSIS — I10 ESSENTIAL HYPERTENSION: ICD-10-CM

## 2023-01-16 DIAGNOSIS — Z79.899 HIGH RISK MEDICATION USE: ICD-10-CM

## 2023-01-16 DIAGNOSIS — K50.10 CROHN'S DISEASE OF COLON WITHOUT COMPLICATION (HCC): Chronic | ICD-10-CM

## 2023-01-16 DIAGNOSIS — D84.9 IMMUNOSUPPRESSION (HCC): ICD-10-CM

## 2023-01-16 DIAGNOSIS — K50.90 CROHN'S RELATED ARTHRITIS (HCC): ICD-10-CM

## 2023-01-16 DIAGNOSIS — M07.60 CROHN'S RELATED ARTHRITIS (HCC): Primary | ICD-10-CM

## 2023-01-16 DIAGNOSIS — K50.90 CROHN'S RELATED ARTHRITIS (HCC): Primary | ICD-10-CM

## 2023-01-16 DIAGNOSIS — M12.811 ROTATOR CUFF ARTHROPATHY OF BOTH SHOULDERS: ICD-10-CM

## 2023-01-16 DIAGNOSIS — M81.0 AGE-RELATED OSTEOPOROSIS WITHOUT CURRENT PATHOLOGICAL FRACTURE: ICD-10-CM

## 2023-01-16 LAB
ALBUMIN SERPL-MCNC: 4.6 G/DL (ref 3.5–5.2)
ALP BLD-CCNC: 60 U/L (ref 35–104)
ALT SERPL-CCNC: 13 U/L (ref 0–32)
ANION GAP SERPL CALCULATED.3IONS-SCNC: 19 MMOL/L (ref 7–16)
AST SERPL-CCNC: 18 U/L (ref 0–31)
BASOPHILS ABSOLUTE: 0.11 E9/L (ref 0–0.2)
BASOPHILS RELATIVE PERCENT: 1.1 % (ref 0–2)
BILIRUB SERPL-MCNC: 0.3 MG/DL (ref 0–1.2)
BUN BLDV-MCNC: 15 MG/DL (ref 6–23)
C-REACTIVE PROTEIN: 0.4 MG/DL (ref 0–0.4)
CALCIUM SERPL-MCNC: 10.9 MG/DL (ref 8.6–10.2)
CHLORIDE BLD-SCNC: 105 MMOL/L (ref 98–107)
CO2: 19 MMOL/L (ref 22–29)
CREAT SERPL-MCNC: 1 MG/DL (ref 0.5–1)
EOSINOPHILS ABSOLUTE: 0.33 E9/L (ref 0.05–0.5)
EOSINOPHILS RELATIVE PERCENT: 3.3 % (ref 0–6)
GFR SERPL CREATININE-BSD FRML MDRD: >60 ML/MIN/1.73
GLUCOSE BLD-MCNC: 85 MG/DL (ref 74–99)
HCT VFR BLD CALC: 41.5 % (ref 34–48)
HEMOGLOBIN: 13.2 G/DL (ref 11.5–15.5)
IMMATURE GRANULOCYTES #: 0.07 E9/L
IMMATURE GRANULOCYTES %: 0.7 % (ref 0–5)
LYMPHOCYTES ABSOLUTE: 2.88 E9/L (ref 1.5–4)
LYMPHOCYTES RELATIVE PERCENT: 29.1 % (ref 20–42)
MCH RBC QN AUTO: 27 PG (ref 26–35)
MCHC RBC AUTO-ENTMCNC: 31.8 % (ref 32–34.5)
MCV RBC AUTO: 85 FL (ref 80–99.9)
MONOCYTES ABSOLUTE: 0.88 E9/L (ref 0.1–0.95)
MONOCYTES RELATIVE PERCENT: 8.9 % (ref 2–12)
NEUTROPHILS ABSOLUTE: 5.63 E9/L (ref 1.8–7.3)
NEUTROPHILS RELATIVE PERCENT: 56.9 % (ref 43–80)
PDW BLD-RTO: 18.4 FL (ref 11.5–15)
PLATELET # BLD: 352 E9/L (ref 130–450)
PMV BLD AUTO: 10.5 FL (ref 7–12)
POTASSIUM SERPL-SCNC: 4.2 MMOL/L (ref 3.5–5)
RBC # BLD: 4.88 E12/L (ref 3.5–5.5)
RHEUMATOID FACTOR: <10 IU/ML (ref 0–13)
SEDIMENTATION RATE, ERYTHROCYTE: 20 MM/HR (ref 0–20)
SODIUM BLD-SCNC: 143 MMOL/L (ref 132–146)
TOTAL PROTEIN: 8.2 G/DL (ref 6.4–8.3)
WBC # BLD: 9.9 E9/L (ref 4.5–11.5)

## 2023-01-16 PROCEDURE — 73630 X-RAY EXAM OF FOOT: CPT

## 2023-01-16 PROCEDURE — 73130 X-RAY EXAM OF HAND: CPT

## 2023-01-16 RX ORDER — VIT C/B6/B5/MAGNESIUM/HERB 173 50-5-6-5MG
CAPSULE ORAL DAILY
COMMUNITY

## 2023-01-16 ASSESSMENT — ENCOUNTER SYMPTOMS
COUGH: 0
SHORTNESS OF BREATH: 0
ABDOMINAL PAIN: 0
COLOR CHANGE: 0
DIARRHEA: 1
VOMITING: 0
BACK PAIN: 1
TROUBLE SWALLOWING: 0
NAUSEA: 0

## 2023-01-16 NOTE — PROGRESS NOTES
Carmen Wise 1960 is a 63 y.o. female, here for evaluation of the following chief complaint(s):  New Patient (Patient here as a new patient for inflammatory arthritis. )         ASSESSMENT/PLAN:    Carmen Wise 1960 is a 63 y.o. female seen in consult for Crohn's related inflammatory arthritis. 1.  Crohn's related inflammatory arthritis-doing fair on Stelara every 8 weeks from GI and methotrexate 25 mg injectable weekly plus folic acid 2 mg daily. She does have a little puffiness in the MCP joints but they are not tender. She does have issues with osteoarthritis particularly of the CMC joints and rotator cuff arthropathy. She has had both knees replaced. I would not make any changes for now but will need further work-up as below to get a better characterization of her disease. Of note from a Crohn's standpoint she is due for repeat colonoscopy. 2.  Immunosuppression-I recommend she stay up-to-date on vaccinations. In the event of any acute infectious illness she should hold Stelara and methotrexate. 3.  Medication monitoring-we will need to monitor basic blood work every 3 months on methotrexate. 4.  Osteoarthritis-needs to avoid NSAIDs because of Crohn's disease. Advised she can take Tylenol as needed not to exceed 3000 mg total in a day. 5.  Hypertension-patient's with inflammatory arthritis have an increased cardiovascular risk. She is on blood pressure medication. 6.  Osteoporosis-on Reclast.  She has had 4 doses. We will update bone density scan. If she is still in the osteoporosis range we will likely give her 1/5 dose of Reclast.  After that would need to switch to Prolia. She is on vitamin D supplementation. 1. Crohn's related arthritis ([facility] [address]  -     CBC with Auto Differential; Future  -     Comprehensive Metabolic Panel; Future  -     C-Reactive Protein; Future  -     Sedimentation Rate; Future  -     Rheumatoid Factor;  Future  -     Cyclic Citrul Peptide Antibody, IgG; Future  -     HLA-B27 Antigen; Future  -     XR HAND LEFT (MIN 3 VIEWS); Future  -     XR HAND RIGHT (MIN 3 VIEWS); Future  -     XR FOOT LEFT (MIN 3 VIEWS); Future  -     XR FOOT RIGHT (MIN 3 VIEWS); Future  -     Hepatitis B Core Antibody, Total; Future  -     Hepatitis B Surface Antibody; Future  -     Hepatitis B Surface Antigen; Future  -     Hepatitis C Antibody; Future  -     T-Spot TB Test; Future  2. Immunosuppression ([facility] [place] [facility]  -     CBC with Auto Differential; Future  -     Comprehensive Metabolic Panel; Future  -     C-Reactive Protein; Future  -     Sedimentation Rate; Future  -     Rheumatoid Factor; Future  -     Cyclic Citrul Peptide Antibody, IgG; Future  -     HLA-B27 Antigen; Future  -     Hepatitis B Core Antibody, Total; Future  -     Hepatitis B Surface Antibody; Future  -     Hepatitis B Surface Antigen; Future  -     Hepatitis C Antibody; Future  -     T-Spot TB Test; Future  3. High risk medication use  -     CBC with Auto Differential; Future  -     Comprehensive Metabolic Panel; Future  -     C-Reactive Protein; Future  -     Sedimentation Rate; Future  -     Rheumatoid Factor; Future  -     Cyclic Citrul Peptide Antibody, IgG; Future  -     HLA-B27 Antigen; Future  -     Hepatitis B Core Antibody, Total; Future  -     Hepatitis B Surface Antibody; Future  -     Hepatitis B Surface Antigen; Future  -     Hepatitis C Antibody; Future  -     T-Spot TB Test; Future  4. Crohn's disease of colon without complication ([facility] [place] [facility]  -     CBC with Auto Differential; Future  -     Comprehensive Metabolic Panel; Future  -     C-Reactive Protein; Future  -     Sedimentation Rate; Future  -     Rheumatoid Factor; Future  -     Cyclic Citrul Peptide Antibody, IgG; Future  -     HLA-B27 Antigen; Future  -     Hepatitis B Core Antibody, Total; Future  -     Hepatitis B Surface Antibody; Future  -     Hepatitis B Surface Antigen; Future  -     Hepatitis C Antibody;  Future  -     T-Spot TB Test; Future  5. Essential hypertension  6. Age-related osteoporosis without current pathological fracture  -     DEXA BONE DENSITY 2 SITES; Future  7. Rotator cuff arthropathy of both shoulders  -     External Referral To Physical Therapy      Return in about 3 months (around 4/16/2023). Subjective   SUBJECTIVE/OBJECTIVE:    HPI: Vickie Briceno 1960 is a 63 y.o. female seen in follow-up for Crohn's related inflammatory arthritis. Patient states she was diagnosed with Crohn's about 10 years ago. Her joint issues started about the same time as her GI manifestations. She also has pretty significant psoriasis on the scalp and in the skin folds. She is currently on injectable methotrexate 25 mg weekly plus folic acid 2 mg daily and has been on that pretty much since the time of diagnosis. In the past she did well on Humira but when she went off of it for bilateral knee replacements and restarted it it did not recapture the same effect. She was then on Remicade which initially worked well and then became less effective. For about the last year now she has been on Stelara every 8 weeks that is prescribed by GI. She feels like this regimen is doing okay from both a GI and joint standpoint. Her biggest issue is bilateral shoulder pain which is more rotator cuff arthropathy. She also gets pain in the hands, feet, wrists, mainly the CMC joints. She does feel like she gets swelling in the hands. She does get some heel pain which does well with good arch support. For the psoriasis she does use topicals which do help. She does have history of bowel resection and has had multiple abdominal surgeries and has notable abdominal wall hernias that will need fixed at some point. She also has osteoporosis and is overdue for a bone density scan.   She has had 4 doses of Reclast.  Recent vitamin D level is normal.    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    Altered bowel elimination due to intestinal ostomy ([facility] [address]     Anemia     Arthritis     Crohn disease ([facility] [address]     Fatty liver     Hernia     History of DVT (deep vein thrombosis) 6/10/2015    History of pulmonary embolus (PE) 6/10/2015    Hyperlipemia     Hypertension     Hypothyroidism     Intervertebral lumbar disc disorder with myelopathy, lumbar region     Leukocytosis     Migraine variant with headache 1/26/2022    Movement disorder     MRSA infection     UTI    Obesity     Osteoarthritis of both knees     Palpitations     PE (pulmonary embolism)     Rash     fine skin rash not itchy gastro dr aware    Subclavian vein stenosis     Syncope     Thyroid disease     Uterine fibroid     Vitamin D deficiency         Review of Systems   Constitutional:  Negative for fatigue and fever. HENT:  Negative for mouth sores and trouble swallowing. Respiratory:  Negative for cough and shortness of breath. Cardiovascular:  Negative for chest pain. Gastrointestinal:  Positive for diarrhea. Negative for abdominal pain, nausea and vomiting. Genitourinary:  Negative for dysuria and hematuria. Musculoskeletal:  Positive for arthralgias, back pain and joint swelling. Skin:  Positive for rash. Negative for color change. Neurological:  Negative for weakness and numbness. Hematological:  Negative for adenopathy. All other systems reviewed and are negative. Objective   Vitals:    01/16/23 0821   BP: 134/80   Pulse: 74   Resp: 18   SpO2: 99%      Physical Exam  Constitutional:       General: She is not in acute distress. Appearance: Normal appearance. HENT:      Head: Normocephalic and atraumatic. Right Ear: External ear normal.      Left Ear: External ear normal.      Nose: Nose normal.   Eyes:      General: No scleral icterus. Pulmonary:      Effort: Pulmonary effort is normal.   Musculoskeletal:         General: Swelling and tenderness present. No deformity.       Comments: Shoulders are tender without obvious effusion. She does have some puffiness to the second and third MCP joints bilaterally but they are not tender. Both knees have been replaced. No tenderness over the Achilles. Skin:     General: Skin is warm and dry. Findings: No rash. Neurological:      General: No focal deficit present. Mental Status: She is alert and oriented to person, place, and time. Mental status is at baseline. Psychiatric:         Mood and Affect: Mood normal.         Behavior: Behavior normal.          Lab Results   Component Value Date    WBC 9.9 10/11/2022    HGB 13.3 10/11/2022    HCT 44.5 10/11/2022    MCV 92.1 10/11/2022     10/11/2022     Lab Results   Component Value Date     10/11/2022    K 4.2 10/11/2022     10/11/2022    CO2 21 (L) 10/11/2022    BUN 9 10/11/2022    CREATININE 0.7 10/11/2022    GLUCOSE 94 10/11/2022    CALCIUM 10.9 (H) 10/11/2022    PROT 8.0 10/11/2022    LABALBU 4.1 10/11/2022    BILITOT 0.4 10/11/2022    ALKPHOS 77 10/11/2022    AST 19 10/11/2022    ALT 13 10/11/2022    LABGLOM >60 10/11/2022    GFRAA >60 10/11/2022     Lab Results   Component Value Date    NICHOLAS NEGATIVE 03/21/2022     No results found for: RHEUMFACTOR  Lab Results   Component Value Date    SEDRATE 22 (H) 03/21/2022     Lab Results   Component Value Date    CRP 0.4 05/09/2016            An electronic signature was used to authenticate this note. This note was generated with a voice recognition dictation system. Please disregard any errors or omission which have escaped my review.     --Carito Finnegan, DO

## 2023-01-17 LAB
HBV SURFACE AB TITR SER: NORMAL {TITER}
HEPATITIS B CORE TOTAL ANTIBODY: NONREACTIVE
HEPATITIS B SURFACE ANTIGEN INTERPRETATION: NORMAL
HEPATITIS C ANTIBODY INTERPRETATION: NORMAL

## 2023-01-19 LAB — CYCLIC CITRULLINATED PEPTIDE ANTIBODY IGG: <0.4 U/ML (ref 0–7)

## 2023-01-20 LAB
COMMENT: NORMAL
HLA B27: NEGATIVE
REPORT: NORMAL

## 2023-02-07 ENCOUNTER — OFFICE VISIT (OUTPATIENT)
Dept: ENDOCRINOLOGY | Age: 63
End: 2023-02-07
Payer: COMMERCIAL

## 2023-02-07 ENCOUNTER — TELEPHONE (OUTPATIENT)
Dept: ENDOCRINOLOGY | Age: 63
End: 2023-02-07

## 2023-02-07 VITALS
OXYGEN SATURATION: 99 % | HEIGHT: 62 IN | DIASTOLIC BLOOD PRESSURE: 100 MMHG | BODY MASS INDEX: 40.12 KG/M2 | HEART RATE: 78 BPM | SYSTOLIC BLOOD PRESSURE: 153 MMHG | RESPIRATION RATE: 18 BRPM | WEIGHT: 218 LBS

## 2023-02-07 DIAGNOSIS — E83.52 HYPERCALCEMIA: ICD-10-CM

## 2023-02-07 DIAGNOSIS — K50.90 CROHN'S RELATED ARTHRITIS (HCC): Primary | ICD-10-CM

## 2023-02-07 DIAGNOSIS — E21.0 PRIMARY HYPERPARATHYROIDISM (HCC): ICD-10-CM

## 2023-02-07 DIAGNOSIS — E03.9 PRIMARY HYPOTHYROIDISM: ICD-10-CM

## 2023-02-07 DIAGNOSIS — R53.82 CHRONIC FATIGUE: ICD-10-CM

## 2023-02-07 DIAGNOSIS — M81.0 OSTEOPOROSIS, UNSPECIFIED OSTEOPOROSIS TYPE, UNSPECIFIED PATHOLOGICAL FRACTURE PRESENCE: Primary | ICD-10-CM

## 2023-02-07 DIAGNOSIS — D84.9 IMMUNOSUPPRESSION (HCC): ICD-10-CM

## 2023-02-07 DIAGNOSIS — M07.60 CROHN'S RELATED ARTHRITIS (HCC): Primary | ICD-10-CM

## 2023-02-07 DIAGNOSIS — E55.9 VITAMIN D DEFICIENCY: ICD-10-CM

## 2023-02-07 PROCEDURE — G8484 FLU IMMUNIZE NO ADMIN: HCPCS | Performed by: INTERNAL MEDICINE

## 2023-02-07 PROCEDURE — 3074F SYST BP LT 130 MM HG: CPT | Performed by: INTERNAL MEDICINE

## 2023-02-07 PROCEDURE — G8417 CALC BMI ABV UP PARAM F/U: HCPCS | Performed by: INTERNAL MEDICINE

## 2023-02-07 PROCEDURE — 99204 OFFICE O/P NEW MOD 45 MIN: CPT | Performed by: INTERNAL MEDICINE

## 2023-02-07 PROCEDURE — 3078F DIAST BP <80 MM HG: CPT | Performed by: INTERNAL MEDICINE

## 2023-02-07 PROCEDURE — G8427 DOCREV CUR MEDS BY ELIG CLIN: HCPCS | Performed by: INTERNAL MEDICINE

## 2023-02-07 PROCEDURE — 3017F COLORECTAL CA SCREEN DOC REV: CPT | Performed by: INTERNAL MEDICINE

## 2023-02-07 PROCEDURE — 1036F TOBACCO NON-USER: CPT | Performed by: INTERNAL MEDICINE

## 2023-02-07 RX ORDER — METHOTREXATE 25 MG/ML
INJECTION, SOLUTION INTRA-ARTERIAL; INTRAMUSCULAR; INTRAVENOUS
Qty: 12 ML | Refills: 1 | Status: SHIPPED | OUTPATIENT
Start: 2023-02-07

## 2023-02-07 NOTE — TELEPHONE ENCOUNTER
Patient in office for an appointment with Dr. Anil Victoria, but is needing a refill on the Methotrexate injection. Patient states that she injects 1mL (25mg) into the skin once weekly. Medication pended to review.     Electronically signed by Rowan Red on 2/7/2023 at 8:19 AM

## 2023-02-07 NOTE — PROGRESS NOTES
[address] [place] [address] Department of Endocrinology Diabetes and Metabolism   [address] [facility] [address]   Phone: [phone]  Fax: [phone]    Date of Service: 2/7/2023  Primary Care Physician: Ghada Cordova,   Referring physician: No ref. provider found  Provider: Oskar Montanez MD        Reason for the visit:  Primary Hypothyroidism    History of Present Illness: The history is provided by the patient. No  was used. Accuracy of the patient data is excellent. Mackenzie Cee is a very pleasant 63 y.o. female with PMG of Crohn's  disease and hypothyroidism seen today for management of hypothyroidism     The patient was diagnosed with hypothyroidism 23 years  ago and since then has been on thyroid hormones replacement. The patient is currently on Levothyroxine 125 mcg daily. Patient takes levothyroxine at 7pm (night shifts) at empty stomach, wait one hour before eating , avoid multivitamins containing calcium  or iron with it. No recent adjustment in her dose     Mother and grand-parents from mother side  with thyroid disease     Lab Results   Component Value Date/Time    TSH 1.540 10/11/2022 02:23 PM    T4FREE 1.25 09/03/2022 10:46 AM    V5APHEX 8.7 10/11/2022 02:23 PM     Pt reports being tired all the time, +  hair loss.  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    Altered bowel elimination due to intestinal ostomy ([address]     Anemia     Arthritis     Crohn disease ([address]     Fatty liver     Hernia     History of DVT (deep vein thrombosis) 6/10/2015    History of pulmonary embolus (PE) 6/10/2015    Hyperlipemia     Hypertension     Hypothyroidism     Intervertebral lumbar disc disorder with myelopathy, lumbar region     Leukocytosis     Migraine variant with headache 1/26/2022    Movement disorder MRSA infection     UTI    Obesity     Osteoarthritis of both knees     Palpitations     PE (pulmonary embolism)     Rash     fine skin rash not itchy gastro dr aware    Subclavian vein stenosis     Syncope     Thyroid disease     Uterine fibroid     Vitamin D deficiency        PAST SURGICAL HISTORY   Past Surgical History:   Procedure Laterality Date    ABDOMEN SURGERY  11/2/14    I&d abdominal wound    CARPAL TUNNEL RELEASE      CHOLECYSTECTOMY      COLECTOMY      ostomy    COLECTOMY      COLONOSCOPY      ENDOMETRIAL ABLATION      GASTROSTOMY TUBE PLACEMENT      HERNIA REPAIR      ILEOSTOMY OR JEJUNOSTOMY      placed 2/2015 reversed 9/2015    JOINT REPLACEMENT  11/2013    both knees    TONSILLECTOMY      TOTAL KNEE ARTHROPLASTY Bilateral 2013    TUBAL LIGATION      TUNNELED VENOUS CATHETER PLACEMENT         SOCIAL HISTORY   Tobacco:   reports that she has never smoked. She has never used smokeless tobacco.  Alcohol:   reports that she does not currently use alcohol. Drugs:   reports no history of drug use.     FAMILY HISTORY   Family History   Problem Relation Age of Onset    Hypertension Mother        ALLERGIES AND DRUG REACTIONS   Allergies   Allergen Reactions    Amoxicillin-Pot Clavulanate Rash    Biaxin [Clarithromycin] Hives and Rash    Cefaclor Hives and Rash     rash    Clavulanic Acid Rash    Doxycycline Rash    Macrobid [Nitrofurantoin] Nausea And Vomiting       CURRENT MEDICATIONS   Current Outpatient Medications   Medication Sig Dispense Refill    turmeric 500 MG CAPS Take by mouth daily      losartan (COZAAR) 100 MG tablet Take 1 tablet by mouth daily 90 tablet 3    potassium chloride (KLOR-CON M) 20 MEQ extended release tablet Take 1 tablet by mouth 2 times daily 180 tablet 3    metoprolol tartrate (LOPRESSOR) 25 MG tablet Take 1 tablet by mouth 2 times daily 180 tablet 3    DULoxetine (CYMBALTA) 60 MG extended release capsule Take 1 capsule by mouth daily 90 capsule 5    cetirizine (ZYRTEC) 10 MG tablet Take 1 tablet by mouth daily 90 tablet 5    folic acid (FOLVITE) 1 MG tablet Take 1 tablet by mouth daily (Patient taking differently: Take 1 mg by mouth in the morning and at bedtime) 90 tablet 5    levothyroxine (SYNTHROID) 125 MCG tablet Take 1 tablet by mouth daily 90 tablet 3    azelastine (ASTELIN) 0.1 % nasal spray 1-2 sprays by Nasal route 2 times daily as needed for Rhinitis Use in each nostril as directed 30 mL 1    zinc gluconate 50 MG tablet Take 50 mg by mouth daily      magnesium (MAGNESIUM-OXIDE) 250 MG TABS tablet Take 500 mg by mouth daily      ascorbic acid (VITAMIN C) 100 MG tablet       Biotin 5 MG CAPS Take 5 mg by mouth daily      L-Lysine 1000 MG TABS Take 1,000 mg by mouth daily      methotrexate Sodium (RHEUMATREX) 50 MG/2ML chemo injection INJECT 1 ML (25 MG) SUBCUTANEOUSLY ONCE A WEEK      omeprazole (PRILOSEC) 20 MG delayed release capsule Take 20 mg by mouth daily      thiamine 100 MG tablet       STELARA 90 MG/ML SOSY prefilled syringe Every 6 weeks      sulconazole (EXELDERM) 1 % cream Apply topically bid 1 Tube 12    oxiconazole nitrate (OXISTAT) 1 % CREA cream Bid to breast fold 1 Tube 12    Cholecalciferol (VITAMIN D3) 2000 UNITS CAPS Take 3,000 Int'l Units by mouth daily. topiramate (TOPAMAX) 25 MG tablet Take 2 tablets by mouth 2 times daily (Patient taking differently: Take 25 mg by mouth daily) 360 tablet 5    topiramate (TOPAMAX) 25 MG tablet Take 1 tablet by mouth 2 times daily Indications: Chronic migraine headaches 60 tablet 2    gabapentin (NEURONTIN) 300 MG capsule Take 1 capsule by mouth 2 times daily for 10 days. 20 capsule 0     No current facility-administered medications for this visit. Review of Systems  Constitutional: No fever, no chills, no diaphoresis, no generalized weakness.   HEENT: No blurred vision, No sore throat, no ear pain, no hair loss  Neck: denied any neck swelling, difficulty swallowing,   Cadrdiopulomary: No CP, SOB or palpitation, No orthopnea or PND. No cough or wheezing. GI: No N/V/D, no constipation, No abdominal pain, no melena or hematochezia   : Denied any dysuria, hematuria, flank pain, discharge, or incontinence. Skin: denied any rash, ulcer, Hirsute, or hyperpigmentation. MSK: denied any joint deformity, joint pain/swelling, muscle pain, or back pain. Neuro: no numbess, no tingling, no weakness,     OBJECTIVE    BP (!) 153/100   Pulse 78   Resp 18   Ht 5' 2\" (1.575 m)   Wt 218 lb (98.9 kg)   SpO2 99%   BMI 39.87 kg/m²   BP Readings from Last 4 Encounters:   02/07/23 (!) 153/100   01/16/23 134/80   10/21/22 128/83   10/14/22 128/70     Wt Readings from Last 6 Encounters:   02/07/23 218 lb (98.9 kg)   01/16/23 205 lb (93 kg)   10/21/22 203 lb 3.2 oz (92.2 kg)   10/14/22 203 lb (92.1 kg)   09/20/22 203 lb (92.1 kg)   09/13/22 208 lb (94.3 kg)       Physical examination:  General: awake alert, oriented x3, no abnormal position or movements. HEENT: normocephalic non traumatic  Neck: supple, no LN enlargement, no thyromegaly, no thyroid tenderness, no JVD. Pulm: Clear equal air entry no added sounds, no wheezing or rhonchi    CVS: S1 + S2, no murmur, no heave. Dorsalis pedis pulse palpable   Abd: soft lax, no tenderness, no organomegaly, audible bowel sounds. Skin: warm, no lesions, no rash.   Musculoskeletal: No back tenderness, no kyphosis/scoliosis   Neuro: CN intact, sensation normal , muscle power normal.  Psych: normal mood, and affect    Review of Laboratory Data:  I have reviewed the following:  Lab Results   Component Value Date/Time    WBC 9.9 01/16/2023 09:10 AM    RBC 4.88 01/16/2023 09:10 AM    HGB 13.2 01/16/2023 09:10 AM    HCT 41.5 01/16/2023 09:10 AM    MCV 85.0 01/16/2023 09:10 AM    MCH 27.0 01/16/2023 09:10 AM    MCHC 31.8 (L) 01/16/2023 09:10 AM    RDW 18.4 (H) 01/16/2023 09:10 AM     01/16/2023 09:10 AM    MPV 10.5 01/16/2023 09:10 AM      Lab Results   Component Value Date/Time     01/16/2023 09:10 AM    K 4.2 01/16/2023 09:10 AM    K 3.2 (L) 09/03/2022 10:46 AM    CO2 19 (L) 01/16/2023 09:10 AM    BUN 15 01/16/2023 09:10 AM    CREATININE 1.0 01/16/2023 09:10 AM    CREATININE 0.7 03/02/2019 12:00 AM    CALCIUM 10.9 (H) 01/16/2023 09:10 AM    LABGLOM >60 01/16/2023 09:10 AM    GFRAA >60 10/11/2022 02:23 PM      Lab Results   Component Value Date/Time    TSH 1.540 10/11/2022 02:23 PM    T4FREE 1.25 09/03/2022 10:46 AM    U0QIQXH 8.7 10/11/2022 02:23 PM     Lab Results   Component Value Date/Time    LABA1C 5.4 10/11/2022 02:23 PM    GLUCOSE 85 01/16/2023 09:10 AM     Lab Results   Component Value Date/Time    TRIG 171 10/11/2022 02:23 PM    HDL 52 10/11/2022 02:23 PM    LDLCALC 99 10/11/2022 02:23 PM    CHOL 185 10/11/2022 02:23 PM     Lab Results   Component Value Date/Time    VITD25 47 10/11/2022 02:23 PM    VITD25 35 03/21/2022 04:55 PM       ASSESSMENT & RECOMMENDATIONS   Mackenzie Cee, a 63 y.o.-old female seen in for following issues     Primary hypothyroidism   On levothyroxine 125 mcg daily  Check TFT and adjust the dose f needed   Pt was instructed to take levothyroxine in the morning at empty stomach, wait one hour before eating , avoid multivitamins containing calcium  or iron with it. Hypercalcemia due to Primary hyperparathyroidism   Check Ca, PTH, VitD level, Albumin  Will also obtain 3 sites DXA scan and 24 hr urine calcium  Will follow result and proceed accordingly     vitD deficiency  Continue vitD supplement  Check level     I personally reviewed external notes from PCP and other patient's care team providers, and personally interpreted labs associated with the above diagnosis. I also ordered labs to further assess and manage the above addressed medical conditions. Return in about 6 months (around 8/7/2023) for hypothyroidism, Primary hyperparathyroidism, VitD deficiency.     The above issues were reviewed with the patient who understood and agreed with the plan.    Thank you for allowing us to participate in the care of this patient. Please do not hesitate to contact us with any additional questions. Diagnosis Orders   1. Osteoporosis, unspecified osteoporosis type, unspecified pathological fracture presence  PTH, Intact    Vitamin D 25 Hydroxy    Basic Metabolic Panel    Albumin    Calcium, 24 HR Urine    DEXA BONE DENSITY AXIAL SKELETON      2. Primary hyperparathyroidism (HCC)  DEXA BONE DENSITY AXIAL SKELETON      3. Primary hypothyroidism  T4, Free      4. Vitamin D deficiency  Vitamin D 25 Hydroxy    Basic Metabolic Panel      5. Hypercalcemia  PTH, Intact    Vitamin D 25 Hydroxy    Basic Metabolic Panel      6. Chronic fatigue  Vitamin D 25 Hydroxy    T4, Free    Cortisol Total          Helio Oconnor MD  Endocrinologist, [facility]   [address] [facility] [address]   Phone: [phone]  Fax: [phone]  ------------------------------  An electronic signature was used to authenticate this note.  Dano Maxwell MD on 2/7/2023 at 8:23 AM

## 2023-02-07 NOTE — TELEPHONE ENCOUNTER
I spoke to [facility] radiology. She has a DEXA appt tomorrow. I requested that the radiologist specifically does her axial as well as her forearm. She has the diagnosis of hyperparathyroidism.

## 2023-02-08 ENCOUNTER — HOSPITAL ENCOUNTER (OUTPATIENT)
Dept: MAMMOGRAPHY | Age: 63
Discharge: HOME | End: 2023-02-10
Payer: COMMERCIAL

## 2023-02-08 DIAGNOSIS — E21.0 PRIMARY HYPERPARATHYROIDISM (HCC): ICD-10-CM

## 2023-02-08 DIAGNOSIS — M81.0 OSTEOPOROSIS, UNSPECIFIED OSTEOPOROSIS TYPE, UNSPECIFIED PATHOLOGICAL FRACTURE PRESENCE: ICD-10-CM

## 2023-02-08 DIAGNOSIS — M81.0 AGE-RELATED OSTEOPOROSIS WITHOUT CURRENT PATHOLOGICAL FRACTURE: ICD-10-CM

## 2023-02-08 PROCEDURE — 77080 DXA BONE DENSITY AXIAL: CPT

## 2023-02-11 ENCOUNTER — HOSPITAL ENCOUNTER (OUTPATIENT)
Age: 63
Discharge: HOME | End: 2023-02-11
Payer: COMMERCIAL

## 2023-02-11 DIAGNOSIS — M81.0 AGE-RELATED OSTEOPOROSIS WITHOUT CURRENT PATHOLOGICAL FRACTURE: ICD-10-CM

## 2023-02-11 DIAGNOSIS — E55.9 VITAMIN D DEFICIENCY: ICD-10-CM

## 2023-02-11 DIAGNOSIS — E03.9 PRIMARY HYPOTHYROIDISM: ICD-10-CM

## 2023-02-11 DIAGNOSIS — D51.8 VITAMIN B12 DEFICIENCY (DIETARY) ANEMIA: ICD-10-CM

## 2023-02-11 DIAGNOSIS — M81.0 OSTEOPOROSIS, UNSPECIFIED OSTEOPOROSIS TYPE, UNSPECIFIED PATHOLOGICAL FRACTURE PRESENCE: ICD-10-CM

## 2023-02-11 DIAGNOSIS — E83.52 HYPERCALCEMIA: ICD-10-CM

## 2023-02-11 DIAGNOSIS — E03.9 ACQUIRED HYPOTHYROIDISM: Chronic | ICD-10-CM

## 2023-02-11 DIAGNOSIS — E11.9 DIET-CONTROLLED DIABETES MELLITUS (HCC): ICD-10-CM

## 2023-02-11 DIAGNOSIS — I10 ESSENTIAL HYPERTENSION: Chronic | ICD-10-CM

## 2023-02-11 DIAGNOSIS — R53.82 CHRONIC FATIGUE: ICD-10-CM

## 2023-02-11 LAB
ALBUMIN SERPL-MCNC: 4.1 G/DL (ref 3.5–5.2)
ALBUMIN SERPL-MCNC: 4.2 G/DL (ref 3.5–5.2)
ALP BLD-CCNC: 55 U/L (ref 35–104)
ALT SERPL-CCNC: 17 U/L (ref 0–32)
ANION GAP SERPL CALCULATED.3IONS-SCNC: 9 MMOL/L (ref 7–16)
ANION GAP SERPL CALCULATED.3IONS-SCNC: 9 MMOL/L (ref 7–16)
AST SERPL-CCNC: 20 U/L (ref 0–31)
BACTERIA: ABNORMAL /HPF
BASOPHILS ABSOLUTE: 0.09 E9/L (ref 0–0.2)
BASOPHILS RELATIVE PERCENT: 1 % (ref 0–2)
BILIRUB SERPL-MCNC: 0.6 MG/DL (ref 0–1.2)
BILIRUBIN URINE: NEGATIVE
BLOOD, URINE: ABNORMAL
BUN BLDV-MCNC: 10 MG/DL (ref 6–23)
BUN BLDV-MCNC: 10 MG/DL (ref 6–23)
CALCIUM SERPL-MCNC: 10.3 MG/DL (ref 8.6–10.2)
CALCIUM SERPL-MCNC: 10.4 MG/DL (ref 8.6–10.2)
CHLORIDE BLD-SCNC: 102 MMOL/L (ref 98–107)
CHLORIDE BLD-SCNC: 104 MMOL/L (ref 98–107)
CHOLESTEROL, TOTAL: 180 MG/DL (ref 0–199)
CLARITY: CLEAR
CO2: 25 MMOL/L (ref 22–29)
CO2: 26 MMOL/L (ref 22–29)
COLOR: YELLOW
CORTISOL TOTAL: 2.16 MCG/DL (ref 2.68–18.4)
CREAT SERPL-MCNC: 0.7 MG/DL (ref 0.5–1)
CREAT SERPL-MCNC: 0.7 MG/DL (ref 0.5–1)
EOSINOPHILS ABSOLUTE: 0.41 E9/L (ref 0.05–0.5)
EOSINOPHILS RELATIVE PERCENT: 4.7 % (ref 0–6)
GFR SERPL CREATININE-BSD FRML MDRD: >60 ML/MIN/1.73
GFR SERPL CREATININE-BSD FRML MDRD: >60 ML/MIN/1.73
GLUCOSE BLD-MCNC: 86 MG/DL (ref 74–99)
GLUCOSE BLD-MCNC: 88 MG/DL (ref 74–99)
GLUCOSE URINE: NEGATIVE MG/DL
HBA1C MFR BLD: 5.6 % (ref 4–5.6)
HCT VFR BLD CALC: 42.8 % (ref 34–48)
HDLC SERPL-MCNC: 53 MG/DL
HEMOGLOBIN: 13.2 G/DL (ref 11.5–15.5)
IMMATURE GRANULOCYTES #: 0.05 E9/L
IMMATURE GRANULOCYTES %: 0.6 % (ref 0–5)
KETONES, URINE: NEGATIVE MG/DL
LDL CHOLESTEROL CALCULATED: 102 MG/DL (ref 0–99)
LEUKOCYTE ESTERASE, URINE: ABNORMAL
LYMPHOCYTES ABSOLUTE: 2.81 E9/L (ref 1.5–4)
LYMPHOCYTES RELATIVE PERCENT: 32.4 % (ref 20–42)
MCH RBC QN AUTO: 27.1 PG (ref 26–35)
MCHC RBC AUTO-ENTMCNC: 30.8 % (ref 32–34.5)
MCV RBC AUTO: 87.9 FL (ref 80–99.9)
MONOCYTES ABSOLUTE: 0.69 E9/L (ref 0.1–0.95)
MONOCYTES RELATIVE PERCENT: 8 % (ref 2–12)
NEUTROPHILS ABSOLUTE: 4.61 E9/L (ref 1.8–7.3)
NEUTROPHILS RELATIVE PERCENT: 53.3 % (ref 43–80)
NITRITE, URINE: NEGATIVE
PARATHYROID HORMONE INTACT: 45 PG/ML (ref 15–65)
PDW BLD-RTO: 17.6 FL (ref 11.5–15)
PH UA: 6 (ref 5–9)
PLATELET # BLD: 339 E9/L (ref 130–450)
PMV BLD AUTO: 10.4 FL (ref 7–12)
POTASSIUM SERPL-SCNC: 3.9 MMOL/L (ref 3.5–5)
POTASSIUM SERPL-SCNC: 3.9 MMOL/L (ref 3.5–5)
PROTEIN UA: NEGATIVE MG/DL
RBC # BLD: 4.87 E12/L (ref 3.5–5.5)
RBC UA: ABNORMAL /HPF (ref 0–2)
SODIUM BLD-SCNC: 137 MMOL/L (ref 132–146)
SODIUM BLD-SCNC: 138 MMOL/L (ref 132–146)
SPECIFIC GRAVITY UA: 1.02 (ref 1–1.03)
T4 FREE: 1.35 NG/DL (ref 0.93–1.7)
T4 TOTAL: 12.8 MCG/DL (ref 4.5–11.7)
TOTAL PROTEIN: 7.7 G/DL (ref 6.4–8.3)
TRIGL SERPL-MCNC: 123 MG/DL (ref 0–149)
TSH SERPL DL<=0.05 MIU/L-ACNC: 1.86 UIU/ML (ref 0.27–4.2)
UROBILINOGEN, URINE: 0.2 E.U./DL
VITAMIN B-12: 1141 PG/ML (ref 211–946)
VITAMIN D 25-HYDROXY: 35 NG/ML (ref 30–100)
VITAMIN D 25-HYDROXY: 35 NG/ML (ref 30–100)
VLDLC SERPL CALC-MCNC: 25 MG/DL
WBC # BLD: 8.7 E9/L (ref 4.5–11.5)
WBC UA: ABNORMAL /HPF (ref 0–5)

## 2023-02-11 PROCEDURE — 80053 COMPREHEN METABOLIC PANEL: CPT

## 2023-02-11 PROCEDURE — 82306 VITAMIN D 25 HYDROXY: CPT

## 2023-02-11 PROCEDURE — 84436 ASSAY OF TOTAL THYROXINE: CPT

## 2023-02-11 PROCEDURE — 85025 COMPLETE CBC W/AUTO DIFF WBC: CPT

## 2023-02-11 PROCEDURE — 83036 HEMOGLOBIN GLYCOSYLATED A1C: CPT

## 2023-02-11 PROCEDURE — 84439 ASSAY OF FREE THYROXINE: CPT

## 2023-02-11 PROCEDURE — 82040 ASSAY OF SERUM ALBUMIN: CPT

## 2023-02-11 PROCEDURE — 80061 LIPID PANEL: CPT

## 2023-02-11 PROCEDURE — 82533 TOTAL CORTISOL: CPT

## 2023-02-11 PROCEDURE — 36415 COLL VENOUS BLD VENIPUNCTURE: CPT

## 2023-02-11 PROCEDURE — 80048 BASIC METABOLIC PNL TOTAL CA: CPT

## 2023-02-11 PROCEDURE — 83970 ASSAY OF PARATHORMONE: CPT

## 2023-02-11 PROCEDURE — 84443 ASSAY THYROID STIM HORMONE: CPT

## 2023-02-11 PROCEDURE — 81001 URINALYSIS AUTO W/SCOPE: CPT

## 2023-02-11 PROCEDURE — 82607 VITAMIN B-12: CPT

## 2023-02-13 ENCOUNTER — HOSPITAL ENCOUNTER (OUTPATIENT)
Age: 63
Setting detail: SPECIMEN
Discharge: HOME | End: 2023-02-13
Payer: COMMERCIAL

## 2023-02-13 DIAGNOSIS — M81.0 OSTEOPOROSIS, UNSPECIFIED OSTEOPOROSIS TYPE, UNSPECIFIED PATHOLOGICAL FRACTURE PRESENCE: ICD-10-CM

## 2023-02-13 LAB
24HR URINE VOLUME (ML): 1725 ML
24HR URINE VOLUME (ML): 1725 ML
CALCIUM 24 HOUR URINE: 221 MG/24 HR (ref 100–300)
CREATININE 24 HOUR URINE: 1139 MG/24H (ref 720–1510)
CREATININE 24 HOUR URINE: 1173 MG/24H (ref 720–1510)
Lab: 24 HOURS
Lab: 24 HOURS

## 2023-02-13 PROCEDURE — 82340 ASSAY OF CALCIUM IN URINE: CPT

## 2023-02-13 PROCEDURE — 82570 ASSAY OF URINE CREATININE: CPT

## 2023-02-15 ENCOUNTER — HOSPITAL ENCOUNTER (OUTPATIENT)
Age: 63
Discharge: HOME | End: 2023-02-15
Payer: COMMERCIAL

## 2023-02-15 PROCEDURE — 82024 ASSAY OF ACTH: CPT

## 2023-02-18 LAB — ADRENOCORTICOTROPIC HORMONE: 4.7 PG/ML (ref 7.2–63.3)

## 2023-02-21 ENCOUNTER — OFFICE VISIT (OUTPATIENT)
Dept: PRIMARY CARE CLINIC | Age: 63
End: 2023-02-21
Payer: COMMERCIAL

## 2023-02-21 VITALS
HEIGHT: 62 IN | TEMPERATURE: 97.7 F | DIASTOLIC BLOOD PRESSURE: 83 MMHG | WEIGHT: 218 LBS | SYSTOLIC BLOOD PRESSURE: 138 MMHG | BODY MASS INDEX: 40.12 KG/M2

## 2023-02-21 DIAGNOSIS — M15.9 PRIMARY OSTEOARTHRITIS INVOLVING MULTIPLE JOINTS: ICD-10-CM

## 2023-02-21 DIAGNOSIS — E03.9 ACQUIRED HYPOTHYROIDISM: Chronic | ICD-10-CM

## 2023-02-21 DIAGNOSIS — K50.10 CROHN'S DISEASE OF COLON WITHOUT COMPLICATION (HCC): Chronic | ICD-10-CM

## 2023-02-21 DIAGNOSIS — Z12.31 BREAST CANCER SCREENING BY MAMMOGRAM: ICD-10-CM

## 2023-02-21 DIAGNOSIS — K50.90 CROHN'S RELATED ARTHRITIS (HCC): ICD-10-CM

## 2023-02-21 DIAGNOSIS — I10 ESSENTIAL HYPERTENSION: Primary | Chronic | ICD-10-CM

## 2023-02-21 DIAGNOSIS — M07.60 CROHN'S RELATED ARTHRITIS (HCC): ICD-10-CM

## 2023-02-21 DIAGNOSIS — E11.9 DIET-CONTROLLED DIABETES MELLITUS (HCC): ICD-10-CM

## 2023-02-21 DIAGNOSIS — E21.3 HYPERPARATHYROIDISM (HCC): ICD-10-CM

## 2023-02-21 DIAGNOSIS — E53.8 VITAMIN B 12 DEFICIENCY: ICD-10-CM

## 2023-02-21 DIAGNOSIS — Z00.01 ENCOUNTER FOR ANNUAL GENERAL MEDICAL EXAMINATION WITH ABNORMAL FINDINGS IN ADULT: ICD-10-CM

## 2023-02-21 DIAGNOSIS — G43.809 MIGRAINE VARIANT WITH HEADACHE: Chronic | ICD-10-CM

## 2023-02-21 DIAGNOSIS — K43.9 VENTRAL HERNIA WITHOUT OBSTRUCTION OR GANGRENE: Chronic | ICD-10-CM

## 2023-02-21 DIAGNOSIS — G44.209 TENSION VASCULAR HEADACHE: ICD-10-CM

## 2023-02-21 DIAGNOSIS — M81.0 AGE-RELATED OSTEOPOROSIS WITHOUT CURRENT PATHOLOGICAL FRACTURE: ICD-10-CM

## 2023-02-21 PROCEDURE — 3044F HG A1C LEVEL LT 7.0%: CPT | Performed by: FAMILY MEDICINE

## 2023-02-21 PROCEDURE — G8484 FLU IMMUNIZE NO ADMIN: HCPCS | Performed by: FAMILY MEDICINE

## 2023-02-21 PROCEDURE — G8428 CUR MEDS NOT DOCUMENT: HCPCS | Performed by: FAMILY MEDICINE

## 2023-02-21 PROCEDURE — 3079F DIAST BP 80-89 MM HG: CPT | Performed by: FAMILY MEDICINE

## 2023-02-21 PROCEDURE — 3017F COLORECTAL CA SCREEN DOC REV: CPT | Performed by: FAMILY MEDICINE

## 2023-02-21 PROCEDURE — 99214 OFFICE O/P EST MOD 30 MIN: CPT | Performed by: FAMILY MEDICINE

## 2023-02-21 PROCEDURE — G8417 CALC BMI ABV UP PARAM F/U: HCPCS | Performed by: FAMILY MEDICINE

## 2023-02-21 PROCEDURE — 2022F DILAT RTA XM EVC RTNOPTHY: CPT | Performed by: FAMILY MEDICINE

## 2023-02-21 PROCEDURE — 3075F SYST BP GE 130 - 139MM HG: CPT | Performed by: FAMILY MEDICINE

## 2023-02-21 PROCEDURE — 1036F TOBACCO NON-USER: CPT | Performed by: FAMILY MEDICINE

## 2023-02-21 RX ORDER — FOLIC ACID 1 MG/1
1 TABLET ORAL 2 TIMES DAILY
Qty: 180 TABLET | Refills: 5 | Status: SHIPPED | OUTPATIENT
Start: 2023-02-21

## 2023-02-21 RX ORDER — POTASSIUM CHLORIDE 20 MEQ/1
20 TABLET, EXTENDED RELEASE ORAL 2 TIMES DAILY
Qty: 180 TABLET | Refills: 3 | Status: SHIPPED | OUTPATIENT
Start: 2023-02-21

## 2023-02-21 RX ORDER — TOPIRAMATE 25 MG/1
25 TABLET ORAL 2 TIMES DAILY
Qty: 180 TABLET | Refills: 5 | Status: SHIPPED | OUTPATIENT
Start: 2023-02-21 | End: 2023-05-22

## 2023-02-21 ASSESSMENT — PATIENT HEALTH QUESTIONNAIRE - PHQ9
2. FEELING DOWN, DEPRESSED OR HOPELESS: 0
1. LITTLE INTEREST OR PLEASURE IN DOING THINGS: 0
SUM OF ALL RESPONSES TO PHQ QUESTIONS 1-9: 0
SUM OF ALL RESPONSES TO PHQ9 QUESTIONS 1 & 2: 0
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
## 2023-04-20 ENCOUNTER — OFFICE VISIT (OUTPATIENT)
Dept: RHEUMATOLOGY | Age: 63
End: 2023-04-20
Payer: COMMERCIAL

## 2023-04-20 VITALS — WEIGHT: 218 LBS | HEIGHT: 62 IN | BODY MASS INDEX: 40.12 KG/M2

## 2023-04-20 DIAGNOSIS — D84.9 IMMUNOSUPPRESSION (HCC): ICD-10-CM

## 2023-04-20 DIAGNOSIS — M15.9 GENERALIZED OSTEOARTHRITIS: ICD-10-CM

## 2023-04-20 DIAGNOSIS — M81.0 AGE-RELATED OSTEOPOROSIS WITHOUT CURRENT PATHOLOGICAL FRACTURE: ICD-10-CM

## 2023-04-20 DIAGNOSIS — M07.60 CROHN'S RELATED ARTHRITIS (HCC): Primary | ICD-10-CM

## 2023-04-20 DIAGNOSIS — Z79.899 HIGH RISK MEDICATION USE: ICD-10-CM

## 2023-04-20 DIAGNOSIS — K50.90 CROHN'S RELATED ARTHRITIS (HCC): ICD-10-CM

## 2023-04-20 DIAGNOSIS — I10 ESSENTIAL HYPERTENSION: ICD-10-CM

## 2023-04-20 DIAGNOSIS — K50.90 CROHN'S RELATED ARTHRITIS (HCC): Primary | ICD-10-CM

## 2023-04-20 DIAGNOSIS — M07.60 CROHN'S RELATED ARTHRITIS (HCC): ICD-10-CM

## 2023-04-20 DIAGNOSIS — K50.10 CROHN'S DISEASE OF COLON WITHOUT COMPLICATION (HCC): Chronic | ICD-10-CM

## 2023-04-20 LAB
ALBUMIN SERPL-MCNC: 4.3 G/DL (ref 3.5–5.2)
ALP SERPL-CCNC: 57 U/L (ref 35–104)
ALT SERPL-CCNC: 16 U/L (ref 0–32)
ANION GAP SERPL CALCULATED.3IONS-SCNC: 9 MMOL/L (ref 7–16)
AST SERPL-CCNC: 20 U/L (ref 0–31)
BASOPHILS # BLD: 0.12 E9/L (ref 0–0.2)
BASOPHILS NFR BLD: 1.2 % (ref 0–2)
BILIRUB SERPL-MCNC: 0.4 MG/DL (ref 0–1.2)
BUN SERPL-MCNC: 13 MG/DL (ref 6–23)
CALCIUM SERPL-MCNC: 11 MG/DL (ref 8.6–10.2)
CHLORIDE SERPL-SCNC: 105 MMOL/L (ref 98–107)
CO2 SERPL-SCNC: 27 MMOL/L (ref 22–29)
CREAT SERPL-MCNC: 0.8 MG/DL (ref 0.5–1)
EOSINOPHIL # BLD: 0.7 E9/L (ref 0.05–0.5)
EOSINOPHIL NFR BLD: 7.2 % (ref 0–6)
ERYTHROCYTE [DISTWIDTH] IN BLOOD BY AUTOMATED COUNT: 17.6 FL (ref 11.5–15)
ERYTHROCYTE [SEDIMENTATION RATE] IN BLOOD BY WESTERGREN METHOD: 8 MM/HR (ref 0–20)
GLUCOSE SERPL-MCNC: 94 MG/DL (ref 74–99)
HCT VFR BLD AUTO: 47.1 % (ref 34–48)
HGB BLD-MCNC: 14 G/DL (ref 11.5–15.5)
IMM GRANULOCYTES # BLD: 0.08 E9/L
IMM GRANULOCYTES NFR BLD: 0.8 % (ref 0–5)
LYMPHOCYTES # BLD: 3.06 E9/L (ref 1.5–4)
LYMPHOCYTES NFR BLD: 31.4 % (ref 20–42)
MCH RBC QN AUTO: 26.9 PG (ref 26–35)
MCHC RBC AUTO-ENTMCNC: 29.7 % (ref 32–34.5)
MCV RBC AUTO: 90.4 FL (ref 80–99.9)
MONOCYTES # BLD: 0.83 E9/L (ref 0.1–0.95)
MONOCYTES NFR BLD: 8.5 % (ref 2–12)
NEUTROPHILS # BLD: 4.97 E9/L (ref 1.8–7.3)
NEUTS SEG NFR BLD: 50.9 % (ref 43–80)
PLATELET # BLD AUTO: 398 E9/L (ref 130–450)
PMV BLD AUTO: 10.4 FL (ref 7–12)
POTASSIUM SERPL-SCNC: 4.3 MMOL/L (ref 3.5–5)
PROT SERPL-MCNC: 8.2 G/DL (ref 6.4–8.3)
RBC # BLD AUTO: 5.21 E12/L (ref 3.5–5.5)
SODIUM SERPL-SCNC: 141 MMOL/L (ref 132–146)
WBC # BLD: 9.8 E9/L (ref 4.5–11.5)

## 2023-04-20 PROCEDURE — G8417 CALC BMI ABV UP PARAM F/U: HCPCS | Performed by: INTERNAL MEDICINE

## 2023-04-20 PROCEDURE — 1036F TOBACCO NON-USER: CPT | Performed by: INTERNAL MEDICINE

## 2023-04-20 PROCEDURE — 3017F COLORECTAL CA SCREEN DOC REV: CPT | Performed by: INTERNAL MEDICINE

## 2023-04-20 PROCEDURE — G8427 DOCREV CUR MEDS BY ELIG CLIN: HCPCS | Performed by: INTERNAL MEDICINE

## 2023-04-20 PROCEDURE — 99215 OFFICE O/P EST HI 40 MIN: CPT | Performed by: INTERNAL MEDICINE

## 2023-04-20 RX ORDER — SODIUM CHLORIDE 0.9 % (FLUSH) 0.9 %
5-40 SYRINGE (ML) INJECTION PRN
OUTPATIENT
Start: 2023-04-20

## 2023-04-20 RX ORDER — SODIUM CHLORIDE 9 MG/ML
5-250 INJECTION, SOLUTION INTRAVENOUS PRN
OUTPATIENT
Start: 2023-04-20

## 2023-04-20 RX ORDER — EPINEPHRINE 1 MG/ML
0.3 INJECTION, SOLUTION, CONCENTRATE INTRAVENOUS PRN
OUTPATIENT
Start: 2023-04-20

## 2023-04-20 RX ORDER — FAMOTIDINE 10 MG/ML
20 INJECTION, SOLUTION INTRAVENOUS
OUTPATIENT
Start: 2023-04-20

## 2023-04-20 RX ORDER — ACETAMINOPHEN 325 MG/1
650 TABLET ORAL
OUTPATIENT
Start: 2023-04-20

## 2023-04-20 RX ORDER — SODIUM CHLORIDE 9 MG/ML
INJECTION, SOLUTION INTRAVENOUS CONTINUOUS
OUTPATIENT
Start: 2023-04-20

## 2023-04-20 RX ORDER — ZOLEDRONIC ACID 5 MG/100ML
5 INJECTION, SOLUTION INTRAVENOUS ONCE
OUTPATIENT
Start: 2023-04-20 | End: 2023-04-20

## 2023-04-20 RX ORDER — METHYLPREDNISOLONE 4 MG/1
TABLET ORAL
Qty: 1 KIT | Refills: 0 | Status: SHIPPED | OUTPATIENT
Start: 2023-04-20

## 2023-04-20 RX ORDER — ALBUTEROL SULFATE 90 UG/1
4 AEROSOL, METERED RESPIRATORY (INHALATION) PRN
OUTPATIENT
Start: 2023-04-20

## 2023-04-20 RX ORDER — DIPHENHYDRAMINE HYDROCHLORIDE 50 MG/ML
50 INJECTION INTRAMUSCULAR; INTRAVENOUS
OUTPATIENT
Start: 2023-04-20

## 2023-04-20 RX ORDER — HEPARIN SODIUM (PORCINE) LOCK FLUSH IV SOLN 100 UNIT/ML 100 UNIT/ML
500 SOLUTION INTRAVENOUS PRN
OUTPATIENT
Start: 2023-04-20

## 2023-04-20 RX ORDER — ONDANSETRON 2 MG/ML
8 INJECTION INTRAMUSCULAR; INTRAVENOUS
OUTPATIENT
Start: 2023-04-20

## 2023-04-20 ASSESSMENT — ENCOUNTER SYMPTOMS
ABDOMINAL PAIN: 0
COLOR CHANGE: 0
NAUSEA: 0
BACK PAIN: 1
VOMITING: 0
COUGH: 0
SHORTNESS OF BREATH: 0
DIARRHEA: 1
TROUBLE SWALLOWING: 0

## 2023-04-20 NOTE — PROGRESS NOTES
Neurological:      General: No focal deficit present. Mental Status: She is alert and oriented to person, place, and time. Mental status is at baseline. Psychiatric:         Mood and Affect: Mood normal.         Behavior: Behavior normal.          Lab Results   Component Value Date    WBC 8.7 02/11/2023    HGB 13.2 02/11/2023    HCT 42.8 02/11/2023    MCV 87.9 02/11/2023     02/11/2023     Lab Results   Component Value Date     02/11/2023     02/11/2023    K 3.9 02/11/2023    K 3.9 02/11/2023     02/11/2023     02/11/2023    CO2 26 02/11/2023    CO2 25 02/11/2023    BUN 10 02/11/2023    BUN 10 02/11/2023    CREATININE 0.7 02/11/2023    CREATININE 0.7 02/11/2023    GLUCOSE 88 02/11/2023    GLUCOSE 86 02/11/2023    CALCIUM 10.4 (H) 02/11/2023    CALCIUM 10.3 (H) 02/11/2023    PROT 7.7 02/11/2023    LABALBU 4.1 02/11/2023    LABALBU 4.2 02/11/2023    BILITOT 0.6 02/11/2023    ALKPHOS 55 02/11/2023    AST 20 02/11/2023    ALT 17 02/11/2023    LABGLOM >60 02/11/2023    LABGLOM >60 02/11/2023    GFRAA >60 10/11/2022     Lab Results   Component Value Date    NICHOLAS NEGATIVE 03/21/2022     No results found for: RHEUMFACTOR  Lab Results   Component Value Date    SEDRATE 20 01/16/2023     Lab Results   Component Value Date    CRP 0.4 01/16/2023            An electronic signature was used to authenticate this note. This note was generated with a voice recognition dictation system. Please disregard any errors or omission which have escaped my review.     --Sherri Pham,

## 2023-04-23 LAB — CCP AB SER IA-ACNC: 1.7 U/ML (ref 0–7)

## 2023-05-19 ENCOUNTER — HOSPITAL ENCOUNTER (OUTPATIENT)
Dept: INFUSION THERAPY | Age: 63
Setting detail: INFUSION SERIES
Discharge: HOME | End: 2023-05-19
Payer: COMMERCIAL

## 2023-05-19 VITALS
HEIGHT: 62 IN | WEIGHT: 218 LBS | DIASTOLIC BLOOD PRESSURE: 80 MMHG | SYSTOLIC BLOOD PRESSURE: 148 MMHG | BODY MASS INDEX: 40.12 KG/M2 | HEART RATE: 67 BPM | TEMPERATURE: 97.1 F | RESPIRATION RATE: 16 BRPM | OXYGEN SATURATION: 97 %

## 2023-05-19 DIAGNOSIS — M81.0 AGE-RELATED OSTEOPOROSIS WITHOUT CURRENT PATHOLOGICAL FRACTURE: Primary | ICD-10-CM

## 2023-05-19 PROCEDURE — 96365 THER/PROPH/DIAG IV INF INIT: CPT

## 2023-05-19 PROCEDURE — 6360000002 HC RX W HCPCS: Performed by: INTERNAL MEDICINE

## 2023-05-19 PROCEDURE — 2580000003 HC RX 258: Performed by: INTERNAL MEDICINE

## 2023-05-19 RX ORDER — ACETAMINOPHEN 325 MG/1
650 TABLET ORAL
OUTPATIENT
Start: 2024-05-17

## 2023-05-19 RX ORDER — ALBUTEROL SULFATE 90 UG/1
4 AEROSOL, METERED RESPIRATORY (INHALATION) PRN
OUTPATIENT
Start: 2024-05-17

## 2023-05-19 RX ORDER — EPINEPHRINE 1 MG/ML
0.3 INJECTION, SOLUTION, CONCENTRATE INTRAVENOUS PRN
OUTPATIENT
Start: 2024-05-17

## 2023-05-19 RX ORDER — DIPHENHYDRAMINE HYDROCHLORIDE 50 MG/ML
50 INJECTION INTRAMUSCULAR; INTRAVENOUS
OUTPATIENT
Start: 2024-05-17

## 2023-05-19 RX ORDER — SODIUM CHLORIDE 0.9 % (FLUSH) 0.9 %
5-40 SYRINGE (ML) INJECTION PRN
Status: DISCONTINUED | OUTPATIENT
Start: 2023-05-19 | End: 2023-05-20 | Stop reason: HOSPADM

## 2023-05-19 RX ORDER — SODIUM CHLORIDE 0.9 % (FLUSH) 0.9 %
5-40 SYRINGE (ML) INJECTION PRN
OUTPATIENT
Start: 2024-05-17

## 2023-05-19 RX ORDER — ONDANSETRON 2 MG/ML
8 INJECTION INTRAMUSCULAR; INTRAVENOUS
OUTPATIENT
Start: 2024-05-17

## 2023-05-19 RX ORDER — HEPARIN SODIUM (PORCINE) LOCK FLUSH IV SOLN 100 UNIT/ML 100 UNIT/ML
500 SOLUTION INTRAVENOUS PRN
OUTPATIENT
Start: 2024-05-17

## 2023-05-19 RX ORDER — ZOLEDRONIC ACID 5 MG/100ML
5 INJECTION, SOLUTION INTRAVENOUS ONCE
Status: COMPLETED | OUTPATIENT
Start: 2023-05-19 | End: 2023-05-19

## 2023-05-19 RX ORDER — ZOLEDRONIC ACID 5 MG/100ML
5 INJECTION, SOLUTION INTRAVENOUS ONCE
OUTPATIENT
Start: 2024-05-17 | End: 2024-05-17

## 2023-05-19 RX ORDER — SODIUM CHLORIDE 9 MG/ML
5-250 INJECTION, SOLUTION INTRAVENOUS PRN
Status: DISCONTINUED | OUTPATIENT
Start: 2023-05-19 | End: 2023-05-20 | Stop reason: HOSPADM

## 2023-05-19 RX ORDER — SODIUM CHLORIDE 9 MG/ML
5-250 INJECTION, SOLUTION INTRAVENOUS PRN
OUTPATIENT
Start: 2024-05-17

## 2023-05-19 RX ORDER — SODIUM CHLORIDE 9 MG/ML
INJECTION, SOLUTION INTRAVENOUS CONTINUOUS
OUTPATIENT
Start: 2024-05-17

## 2023-05-19 RX ADMIN — SODIUM CHLORIDE, PRESERVATIVE FREE 10 ML: 5 INJECTION INTRAVENOUS at 14:09

## 2023-05-19 RX ADMIN — SODIUM CHLORIDE 20 ML/HR: 9 INJECTION, SOLUTION INTRAVENOUS at 14:12

## 2023-05-19 RX ADMIN — ZOLEDRONIC ACID 5 MG: 5 INJECTION, SOLUTION INTRAVENOUS at 14:13

## 2023-05-19 ASSESSMENT — PAIN SCALES - GENERAL: PAINLEVEL_OUTOF10: 0

## 2023-06-04 NOTE — TELEPHONE ENCOUNTER
Re-faxed referral and called Pt to notify her. Pt verified infectious disease contact #. RT intake<EER

## 2023-08-21 DIAGNOSIS — E11.9 DIET-CONTROLLED DIABETES MELLITUS (HCC): ICD-10-CM

## 2023-08-21 DIAGNOSIS — E03.9 ACQUIRED HYPOTHYROIDISM: Chronic | ICD-10-CM

## 2023-08-21 DIAGNOSIS — M81.0 AGE-RELATED OSTEOPOROSIS WITHOUT CURRENT PATHOLOGICAL FRACTURE: ICD-10-CM

## 2023-08-21 DIAGNOSIS — Z00.01 ENCOUNTER FOR ANNUAL GENERAL MEDICAL EXAMINATION WITH ABNORMAL FINDINGS IN ADULT: ICD-10-CM

## 2023-08-21 DIAGNOSIS — K50.90 CROHN'S RELATED ARTHRITIS (HCC): ICD-10-CM

## 2023-08-21 DIAGNOSIS — M07.60 CROHN'S RELATED ARTHRITIS (HCC): ICD-10-CM

## 2023-08-21 DIAGNOSIS — E53.8 VITAMIN B 12 DEFICIENCY: ICD-10-CM

## 2023-08-21 LAB
ABSOLUTE IMMATURE GRANULOCYTE: 0.04 K/UL (ref 0–0.58)
ALBUMIN SERPL-MCNC: 4.2 G/DL (ref 3.5–5.2)
ALP BLD-CCNC: 66 U/L (ref 35–104)
ALT SERPL-CCNC: 21 U/L (ref 0–32)
ANION GAP SERPL CALCULATED.3IONS-SCNC: 11 MMOL/L (ref 7–16)
AST SERPL-CCNC: 20 U/L (ref 0–31)
BACTERIA: ABNORMAL
BASOPHILS ABSOLUTE: 0.08 K/UL (ref 0–0.2)
BASOPHILS RELATIVE PERCENT: 1 % (ref 0–2)
BILIRUB SERPL-MCNC: 0.6 MG/DL (ref 0–1.2)
BILIRUBIN URINE: NEGATIVE
BUN BLDV-MCNC: 11 MG/DL (ref 6–23)
CALCIUM SERPL-MCNC: 9.8 MG/DL (ref 8.6–10.2)
CHLORIDE BLD-SCNC: 109 MMOL/L (ref 98–107)
CHOLESTEROL: 171 MG/DL
CO2: 22 MMOL/L (ref 22–29)
COLOR: YELLOW
CREAT SERPL-MCNC: 0.8 MG/DL (ref 0.5–1)
EOSINOPHILS ABSOLUTE: 0.36 K/UL (ref 0.05–0.5)
EOSINOPHILS RELATIVE PERCENT: 3 % (ref 0–6)
GFR SERPL CREATININE-BSD FRML MDRD: >60 ML/MIN/1.73M2
GLUCOSE BLD-MCNC: 94 MG/DL (ref 74–99)
GLUCOSE URINE: NEGATIVE MG/DL
HBA1C MFR BLD: 5.6 % (ref 4–5.6)
HCT VFR BLD CALC: 41.5 % (ref 34–48)
HDLC SERPL-MCNC: 50 MG/DL
HEMOGLOBIN: 12.8 G/DL (ref 11.5–15.5)
IMMATURE GRANULOCYTES: 0 % (ref 0–5)
KETONES, URINE: NEGATIVE MG/DL
LDL CHOLESTEROL: 96 MG/DL
LEUKOCYTE ESTERASE, URINE: ABNORMAL
LYMPHOCYTES ABSOLUTE: 3.33 K/UL (ref 1.5–4)
LYMPHOCYTES RELATIVE PERCENT: 31 % (ref 20–42)
MCH RBC QN AUTO: 27.2 PG (ref 26–35)
MCHC RBC AUTO-ENTMCNC: 30.8 G/DL (ref 32–34.5)
MCV RBC AUTO: 88.3 FL (ref 80–99.9)
MONOCYTES ABSOLUTE: 0.71 K/UL (ref 0.1–0.95)
MONOCYTES RELATIVE PERCENT: 7 % (ref 2–12)
NEUTROPHILS ABSOLUTE: 6.38 K/UL (ref 1.8–7.3)
NEUTROPHILS RELATIVE PERCENT: 59 % (ref 43–80)
NITRITE, URINE: NEGATIVE
PDW BLD-RTO: 16.2 % (ref 11.5–15)
PH UA: 7 (ref 5–9)
PLATELET # BLD: 329 K/UL (ref 130–450)
PMV BLD AUTO: 10.7 FL (ref 7–12)
POTASSIUM SERPL-SCNC: 4.2 MMOL/L (ref 3.5–5)
PROTEIN UA: 100 MG/DL
RBC # BLD: 4.7 M/UL (ref 3.5–5.5)
RBC UA: ABNORMAL /HPF
SODIUM BLD-SCNC: 142 MMOL/L (ref 132–146)
SPECIFIC GRAVITY UA: 1.01 (ref 1–1.03)
T4 TOTAL: 7.4 UG/DL (ref 4.5–11.7)
TOTAL PROTEIN: 7.4 G/DL (ref 6.4–8.3)
TRIGL SERPL-MCNC: 123 MG/DL
TSH SERPL DL<=0.05 MIU/L-ACNC: 4.05 UIU/ML (ref 0.27–4.2)
TURBIDITY: ABNORMAL
URINE HGB: ABNORMAL
UROBILINOGEN, URINE: 0.2 EU/DL (ref 0–1)
VITAMIN D 25-HYDROXY: 27.9 NG/ML (ref 30–100)
VLDLC SERPL CALC-MCNC: 25 MG/DL
WBC # BLD: 10.9 K/UL (ref 4.5–11.5)
WBC UA: ABNORMAL /HPF

## 2023-08-22 ENCOUNTER — OFFICE VISIT (OUTPATIENT)
Dept: PRIMARY CARE CLINIC | Age: 63
End: 2023-08-22
Payer: COMMERCIAL

## 2023-08-22 DIAGNOSIS — E11.9 DIET-CONTROLLED DIABETES MELLITUS (HCC): ICD-10-CM

## 2023-08-22 DIAGNOSIS — I10 ESSENTIAL HYPERTENSION: ICD-10-CM

## 2023-08-22 DIAGNOSIS — Z00.01 ENCOUNTER FOR ANNUAL GENERAL MEDICAL EXAMINATION WITH ABNORMAL FINDINGS IN ADULT: Primary | ICD-10-CM

## 2023-08-22 DIAGNOSIS — E03.9 ACQUIRED HYPOTHYROIDISM: ICD-10-CM

## 2023-08-22 DIAGNOSIS — N30.00 ACUTE CYSTITIS WITHOUT HEMATURIA: ICD-10-CM

## 2023-08-22 DIAGNOSIS — E53.8 VITAMIN B 12 DEFICIENCY: ICD-10-CM

## 2023-08-22 DIAGNOSIS — E55.9 VITAMIN D DEFICIENCY: ICD-10-CM

## 2023-08-22 LAB
FOLATE: 18.7 NG/ML (ref 4.8–24.2)
VITAMIN B-12: >2000 PG/ML (ref 211–946)

## 2023-08-22 PROCEDURE — 3074F SYST BP LT 130 MM HG: CPT | Performed by: FAMILY MEDICINE

## 2023-08-22 PROCEDURE — 3078F DIAST BP <80 MM HG: CPT | Performed by: FAMILY MEDICINE

## 2023-08-22 PROCEDURE — 99396 PREV VISIT EST AGE 40-64: CPT | Performed by: FAMILY MEDICINE

## 2023-08-22 RX ORDER — CIPROFLOXACIN 250 MG/1
250 TABLET, FILM COATED ORAL 2 TIMES DAILY
Qty: 14 TABLET | Refills: 0 | Status: SHIPPED | OUTPATIENT
Start: 2023-08-22 | End: 2023-08-29

## 2023-08-22 SDOH — ECONOMIC STABILITY: HOUSING INSECURITY
IN THE LAST 12 MONTHS, WAS THERE A TIME WHEN YOU DID NOT HAVE A STEADY PLACE TO SLEEP OR SLEPT IN A SHELTER (INCLUDING NOW)?: NO

## 2023-08-22 SDOH — ECONOMIC STABILITY: INCOME INSECURITY: HOW HARD IS IT FOR YOU TO PAY FOR THE VERY BASICS LIKE FOOD, HOUSING, MEDICAL CARE, AND HEATING?: NOT VERY HARD

## 2023-08-22 SDOH — ECONOMIC STABILITY: FOOD INSECURITY: WITHIN THE PAST 12 MONTHS, YOU WORRIED THAT YOUR FOOD WOULD RUN OUT BEFORE YOU GOT MONEY TO BUY MORE.: NEVER TRUE

## 2023-08-22 SDOH — ECONOMIC STABILITY: FOOD INSECURITY: WITHIN THE PAST 12 MONTHS, THE FOOD YOU BOUGHT JUST DIDN'T LAST AND YOU DIDN'T HAVE MONEY TO GET MORE.: NEVER TRUE

## 2023-08-22 SDOH — ECONOMIC STABILITY: TRANSPORTATION INSECURITY
IN THE PAST 12 MONTHS, HAS LACK OF TRANSPORTATION KEPT YOU FROM MEETINGS, WORK, OR FROM GETTING THINGS NEEDED FOR DAILY LIVING?: NO

## 2023-08-22 NOTE — PROGRESS NOTES
She is well-developed. HENT:      Head: Normocephalic. Right Ear: Tympanic membrane normal.      Left Ear: Tympanic membrane normal.      Nose: Nose normal.      Mouth/Throat:      Mouth: Mucous membranes are moist.   Eyes:      Pupils: Pupils are equal, round, and reactive to light. Cardiovascular:      Rate and Rhythm: Normal rate and regular rhythm. Pulmonary:      Effort: Pulmonary effort is normal.      Breath sounds: Normal breath sounds. Abdominal:      General: Bowel sounds are normal.      Palpations: Abdomen is soft. Musculoskeletal:         General: Normal range of motion. Cervical back: Normal range of motion. Skin:     General: Skin is warm. Neurological:      Mental Status: She is alert and oriented to person, place, and time. Psychiatric:         Behavior: Behavior normal.       Charles Harris was seen today for discuss labs. Diagnoses and all orders for this visit:    Encounter for annual general medical examination with abnormal findings in adult  -     CBC with Auto Differential; Future  -     Comprehensive Metabolic Panel; Future  -     Hemoglobin A1C; Future  -     Lipid Panel; Future  -     T4; Future  -     TSH; Future  -     Urinalysis; Future  -     Vitamin D 25 Hydroxy; Future  -     Vitamin B12; Future    Acute cystitis without hematuria  -     ciprofloxacin (CIPRO) 250 MG tablet; Take 1 tablet by mouth 2 times daily for 7 days    Essential hypertension  -     CBC with Auto Differential; Future  -     Comprehensive Metabolic Panel; Future  -     Hemoglobin A1C; Future  -     Lipid Panel; Future  -     T4; Future  -     TSH; Future  -     Urinalysis; Future  -     Vitamin D 25 Hydroxy; Future  -     Vitamin B12; Future    Acquired hypothyroidism  -     T4; Future  -     TSH;  Future    Diet-controlled diabetes mellitus (HCC)  -     Hemoglobin A1C; Future    Vitamin B 12 deficiency  -     Vitamin B12; Future    Vitamin D deficiency  -     Vitamin D 25 Hydroxy;

## 2023-08-23 VITALS
TEMPERATURE: 97.9 F | BODY MASS INDEX: 40.6 KG/M2 | DIASTOLIC BLOOD PRESSURE: 83 MMHG | WEIGHT: 222 LBS | SYSTOLIC BLOOD PRESSURE: 132 MMHG

## 2023-08-23 ASSESSMENT — PATIENT HEALTH QUESTIONNAIRE - PHQ9
SUM OF ALL RESPONSES TO PHQ QUESTIONS 1-9: 0
2. FEELING DOWN, DEPRESSED OR HOPELESS: 0
1. LITTLE INTEREST OR PLEASURE IN DOING THINGS: 0
SUM OF ALL RESPONSES TO PHQ9 QUESTIONS 1 & 2: 0
SUM OF ALL RESPONSES TO PHQ QUESTIONS 1-9: 0

## 2023-08-23 ASSESSMENT — ENCOUNTER SYMPTOMS
GASTROINTESTINAL NEGATIVE: 1
ALLERGIC/IMMUNOLOGIC NEGATIVE: 1
RESPIRATORY NEGATIVE: 1
EYES NEGATIVE: 1

## 2023-09-11 DIAGNOSIS — D84.9 IMMUNOSUPPRESSION (HCC): ICD-10-CM

## 2023-09-11 DIAGNOSIS — K50.90 CROHN'S RELATED ARTHRITIS (HCC): ICD-10-CM

## 2023-09-11 DIAGNOSIS — M07.60 CROHN'S RELATED ARTHRITIS (HCC): ICD-10-CM

## 2023-09-11 RX ORDER — METHOTREXATE 25 MG/ML
INJECTION, SOLUTION INTRA-ARTERIAL; INTRAMUSCULAR; INTRAVENOUS
Qty: 12 ML | Refills: 1 | Status: SHIPPED | OUTPATIENT
Start: 2023-09-11

## 2023-10-24 ENCOUNTER — OFFICE VISIT (OUTPATIENT)
Dept: RHEUMATOLOGY | Age: 63
End: 2023-10-24
Payer: COMMERCIAL

## 2023-10-24 VITALS — WEIGHT: 230 LBS | BODY MASS INDEX: 42.33 KG/M2 | HEIGHT: 62 IN

## 2023-10-24 DIAGNOSIS — D84.9 IMMUNOSUPPRESSION (HCC): ICD-10-CM

## 2023-10-24 DIAGNOSIS — M07.60 CROHN'S RELATED ARTHRITIS (HCC): Primary | ICD-10-CM

## 2023-10-24 DIAGNOSIS — K50.90 CROHN'S RELATED ARTHRITIS (HCC): Primary | ICD-10-CM

## 2023-10-24 DIAGNOSIS — Z79.899 HIGH RISK MEDICATION USE: ICD-10-CM

## 2023-10-24 DIAGNOSIS — M15.9 GENERALIZED OSTEOARTHRITIS: ICD-10-CM

## 2023-10-24 DIAGNOSIS — M81.0 AGE-RELATED OSTEOPOROSIS WITHOUT CURRENT PATHOLOGICAL FRACTURE: ICD-10-CM

## 2023-10-24 PROCEDURE — 99214 OFFICE O/P EST MOD 30 MIN: CPT | Performed by: INTERNAL MEDICINE

## 2023-10-24 PROCEDURE — G8417 CALC BMI ABV UP PARAM F/U: HCPCS | Performed by: INTERNAL MEDICINE

## 2023-10-24 PROCEDURE — G8484 FLU IMMUNIZE NO ADMIN: HCPCS | Performed by: INTERNAL MEDICINE

## 2023-10-24 PROCEDURE — G8428 CUR MEDS NOT DOCUMENT: HCPCS | Performed by: INTERNAL MEDICINE

## 2023-10-24 PROCEDURE — 1036F TOBACCO NON-USER: CPT | Performed by: INTERNAL MEDICINE

## 2023-10-24 PROCEDURE — 3017F COLORECTAL CA SCREEN DOC REV: CPT | Performed by: INTERNAL MEDICINE

## 2023-10-24 ASSESSMENT — ENCOUNTER SYMPTOMS
SHORTNESS OF BREATH: 0
NAUSEA: 0
COUGH: 0
TROUBLE SWALLOWING: 0
BACK PAIN: 1
ABDOMINAL PAIN: 0
VOMITING: 0
COLOR CHANGE: 0

## 2023-10-24 NOTE — PROGRESS NOTES
John Hsu 1960 is a 63 y.o. female, here for evaluation of the following chief complaint(s):  Follow-up (Patient here for follow up on crohns related inflammatory arthritis. )         ASSESSMENT/PLAN:    John Hsu 1960 is a 63 y.o. female seen in follow-up for Crohn's related inflammatory arthritis. 1.  Crohn's related inflammatory arthritis-doing pretty well on Stelara every 4 weeks from GI and methotrexate 25 mg injectable weekly plus folic acid 2 mg daily. She has very minimal swelling in the left third MCP joint but no synovitis, enthesitis, dactylitis elsewhere on exam.  She does still have some psoriasis at the scalp line. I would not make any changes today. 2.  Immunosuppression-I recommend she stay up-to-date on vaccinations. In the event of any acute infectious illness she should hold Stelara and methotrexate. 3.  High risk medication use-we will need to monitor blood work every 3 months on methotrexate. We will monitor for infection. 4.  Osteoarthritis-needs to avoid NSAIDs because of Crohn's disease. Advised she can take Tylenol as needed not to exceed 3000 mg total in a day. 5.  Hypertension-patient's with inflammatory arthritis have an increased cardiovascular risk. She is on blood pressure medication. 6.  Osteoporosis-on Reclast.  She has had 5 doses. Still with osteoporosis on bone density scan from February 2023. We will switch her to Prolia in May 2024. She is on vitamin D supplementation. 1. Crohn's related arthritis ([address])  -     CBC with Auto Differential; Standing  -     ALT; Standing  -     AST; Standing  -     Creatinine; Standing  -     Sedimentation Rate; Standing  -     C-Reactive Protein; Standing  2. Immunosuppression (formerly [facility])  -     CBC with Auto Differential; Standing  -     ALT; Standing  -     AST; Standing  -     Creatinine; Standing  -     Sedimentation Rate; Standing  -     C-Reactive Protein; Standing  3.  High risk medication

## 2023-11-06 ENCOUNTER — OFFICE VISIT (OUTPATIENT)
Dept: ENDOCRINOLOGY | Age: 63
End: 2023-11-06
Payer: COMMERCIAL

## 2023-11-06 VITALS
DIASTOLIC BLOOD PRESSURE: 88 MMHG | HEART RATE: 78 BPM | SYSTOLIC BLOOD PRESSURE: 150 MMHG | BODY MASS INDEX: 42.07 KG/M2 | HEIGHT: 62 IN | OXYGEN SATURATION: 97 %

## 2023-11-06 DIAGNOSIS — E21.0 PRIMARY HYPERPARATHYROIDISM (HCC): Primary | ICD-10-CM

## 2023-11-06 DIAGNOSIS — E55.9 VITAMIN D DEFICIENCY: ICD-10-CM

## 2023-11-06 DIAGNOSIS — E03.9 PRIMARY HYPOTHYROIDISM: ICD-10-CM

## 2023-11-06 PROCEDURE — 3074F SYST BP LT 130 MM HG: CPT | Performed by: INTERNAL MEDICINE

## 2023-11-06 PROCEDURE — 3078F DIAST BP <80 MM HG: CPT | Performed by: INTERNAL MEDICINE

## 2023-11-06 PROCEDURE — 3017F COLORECTAL CA SCREEN DOC REV: CPT | Performed by: INTERNAL MEDICINE

## 2023-11-06 PROCEDURE — 99214 OFFICE O/P EST MOD 30 MIN: CPT | Performed by: INTERNAL MEDICINE

## 2023-11-06 PROCEDURE — 1036F TOBACCO NON-USER: CPT | Performed by: INTERNAL MEDICINE

## 2023-11-06 PROCEDURE — G8427 DOCREV CUR MEDS BY ELIG CLIN: HCPCS | Performed by: INTERNAL MEDICINE

## 2023-11-06 PROCEDURE — G8484 FLU IMMUNIZE NO ADMIN: HCPCS | Performed by: INTERNAL MEDICINE

## 2023-11-06 PROCEDURE — G8417 CALC BMI ABV UP PARAM F/U: HCPCS | Performed by: INTERNAL MEDICINE

## 2023-11-06 NOTE — PROGRESS NOTES
[facility] Department of Endocrinology Diabetes and Metabolism   [address] [address]   Phone: [phone]  Fax: [phone]    Date of Service: 11/6/2023  Primary Care Physician: Cassy Hubbard DO  Referring physician: No ref. provider found  Provider: Veronica Brown MD        Reason for the visit:  Primary Hypothyroidism, hyperparathyroidism     History of Present Illness: The history is provided by the patient. No  was used. Accuracy of the patient data is excellent. [place] is a very pleasant 66 y.o. female with PMG of Crohn's  disease and hypothyroidism seen today for management of hypothyroidism     The patient was diagnosed with hypothyroidism 23 years  ago and since then has been on thyroid hormones replacement. The patient is currently on Levothyroxine 125 mcg daily    No recent adjustment in her dose     Mother and grand-parents from mother side  with thyroid disease     Lab Results   Component Value Date/Time    TSH 4.05 08/21/2023 04:29 PM    T4FREE 1.35 02/11/2023 11:04 AM    [id number] 7.4 08/21/2023 04:29 PM     Pt reports being tired all the time, +  hair loss.  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

## 2023-12-26 RX ORDER — DULOXETIN HYDROCHLORIDE 60 MG/1
60 CAPSULE, DELAYED RELEASE ORAL DAILY
Qty: 90 CAPSULE | Refills: 5 | Status: SHIPPED | OUTPATIENT
Start: 2023-12-26

## 2024-02-05 DIAGNOSIS — I10 ESSENTIAL HYPERTENSION: Chronic | ICD-10-CM

## 2024-02-05 RX ORDER — LOSARTAN POTASSIUM 100 MG/1
100 TABLET ORAL DAILY
Qty: 90 TABLET | Refills: 3 | Status: SHIPPED | OUTPATIENT
Start: 2024-02-05

## 2024-02-05 NOTE — TELEPHONE ENCOUNTER
Patients last appointment 8/22/2023.  Patients next scheduled appointment   Future Appointments   Date Time Provider Department Center   2/22/2024  7:00 AM Evelio Mace DO [facility]   4/24/2024  9:20 AM Louis Orourke DO BDM [facility]   5/9/2024  8:45 AM Agustin Fields MD [facility]

## 2024-02-22 ENCOUNTER — OFFICE VISIT (OUTPATIENT)
Dept: PRIMARY CARE CLINIC | Age: 64
End: 2024-02-22
Payer: COMMERCIAL

## 2024-02-22 VITALS
BODY MASS INDEX: 42.07 KG/M2 | RESPIRATION RATE: 17 BRPM | OXYGEN SATURATION: 96 % | HEART RATE: 70 BPM | WEIGHT: 230 LBS | TEMPERATURE: 97.6 F

## 2024-02-22 DIAGNOSIS — L71.9 ROSACEA: ICD-10-CM

## 2024-02-22 DIAGNOSIS — L30.9 DERMATITIS: ICD-10-CM

## 2024-02-22 DIAGNOSIS — M07.60 CROHN'S RELATED ARTHRITIS (HCC): ICD-10-CM

## 2024-02-22 DIAGNOSIS — R73.01 IMPAIRED FASTING GLUCOSE: ICD-10-CM

## 2024-02-22 DIAGNOSIS — K50.10 CROHN'S DISEASE OF COLON WITHOUT COMPLICATION (HCC): Chronic | ICD-10-CM

## 2024-02-22 DIAGNOSIS — E53.8 VITAMIN B 12 DEFICIENCY: ICD-10-CM

## 2024-02-22 DIAGNOSIS — K50.90 CROHN'S RELATED ARTHRITIS (HCC): ICD-10-CM

## 2024-02-22 DIAGNOSIS — I10 ESSENTIAL HYPERTENSION: Chronic | ICD-10-CM

## 2024-02-22 DIAGNOSIS — E21.3 HYPERPARATHYROIDISM (HCC): ICD-10-CM

## 2024-02-22 DIAGNOSIS — K43.9 VENTRAL HERNIA WITHOUT OBSTRUCTION OR GANGRENE: Chronic | ICD-10-CM

## 2024-02-22 DIAGNOSIS — E03.9 ACQUIRED HYPOTHYROIDISM: Primary | Chronic | ICD-10-CM

## 2024-02-22 DIAGNOSIS — M81.0 AGE-RELATED OSTEOPOROSIS WITHOUT CURRENT PATHOLOGICAL FRACTURE: ICD-10-CM

## 2024-02-22 DIAGNOSIS — G43.809 MIGRAINE VARIANT WITH HEADACHE: Chronic | ICD-10-CM

## 2024-02-22 DIAGNOSIS — Z12.31 BREAST CANCER SCREENING BY MAMMOGRAM: ICD-10-CM

## 2024-02-22 PROCEDURE — 99214 OFFICE O/P EST MOD 30 MIN: CPT | Performed by: FAMILY MEDICINE

## 2024-02-22 PROCEDURE — G8484 FLU IMMUNIZE NO ADMIN: HCPCS | Performed by: FAMILY MEDICINE

## 2024-02-22 PROCEDURE — 3017F COLORECTAL CA SCREEN DOC REV: CPT | Performed by: FAMILY MEDICINE

## 2024-02-22 PROCEDURE — 1036F TOBACCO NON-USER: CPT | Performed by: FAMILY MEDICINE

## 2024-02-22 PROCEDURE — G8417 CALC BMI ABV UP PARAM F/U: HCPCS | Performed by: FAMILY MEDICINE

## 2024-02-22 PROCEDURE — G8428 CUR MEDS NOT DOCUMENT: HCPCS | Performed by: FAMILY MEDICINE

## 2024-02-22 RX ORDER — TRIAMCINOLONE ACETONIDE 5 MG/G
CREAM TOPICAL
Qty: 120 G | Refills: 5 | Status: SHIPPED | OUTPATIENT
Start: 2024-02-22 | End: 2024-02-29

## 2024-02-22 RX ORDER — OXICONAZOLE NITRATE 10 MG/G
CREAM TOPICAL
Qty: 1 EACH | Refills: 12 | Status: SHIPPED | OUTPATIENT
Start: 2024-02-22

## 2024-02-22 RX ORDER — METRONIDAZOLE 7.5 MG/G
GEL TOPICAL
Qty: 1 EACH | Refills: 5 | Status: SHIPPED | OUTPATIENT
Start: 2024-02-22

## 2024-02-22 ASSESSMENT — ENCOUNTER SYMPTOMS
RESPIRATORY NEGATIVE: 1
GASTROINTESTINAL NEGATIVE: 1
ALLERGIC/IMMUNOLOGIC NEGATIVE: 1
EYES NEGATIVE: 1

## 2024-02-22 NOTE — PROGRESS NOTES
24  Name: Allyson Wiseman    : 1960    Sex: female    Age: 63 y.o.        Subjective:  Chief Complaint: Patient is here for  ck   6 mo  re    bp   crohns dis  thryoid  anx  wieght  oa      Urine  freq        Feel ok faeild to  get lab done   had covid    dec  but faield to se shalini  and   not rx  Seiegn rheum     endo    She  ready to get   abd hernia done   plans   to do at [facility]  Tried nto slep great      refuses to do sleep study  In bed a lto whenot working  Urine freq---       faield fu  with dr noe and wi l go back         Review of Systems   Constitutional: Negative.    HENT: Negative.     Eyes: Negative.    Respiratory: Negative.     Cardiovascular: Negative.    Gastrointestinal: Negative.    Endocrine: Negative.    Genitourinary: Negative.    Musculoskeletal:  Positive for arthralgias.   Skin: Negative.    Allergic/Immunologic: Negative.    Neurological: Negative.    Hematological: Negative.    Psychiatric/Behavioral: Negative.           Current Outpatient Medications:     oxiconazole nitrate (OXISTAT) 1 % CREA cream, Bid to breast fold, Disp: 1 each, Rfl: 12    triamcinolone (ARISTOCORT) 0.5 % cream, Apply topically 3 times daily., Disp: 120 g, Rfl: 5    metroNIDAZOLE (METROGEL) 0.75 % gel, Apply topically 2 times daily.  To face, Disp: 1 each, Rfl: 5    losartan (COZAAR) 100 MG tablet, Take 1 tablet by mouth daily, Disp: 90 tablet, Rfl: 3    metoprolol tartrate (LOPRESSOR) 25 MG tablet, Take 1 tablet by mouth 2 times daily, Disp: 180 tablet, Rfl: 3    DULoxetine (CYMBALTA) 60 MG extended release capsule, Take 1 capsule by mouth daily, Disp: 90 capsule, Rfl: 5    levothyroxine (SYNTHROID) 125 MCG tablet, Take 1 tablet by mouth daily, Disp: 90 tablet, Rfl: 3    methotrexate Sodium 50 MG/2ML chemo injection, INJECT 1 ML (25 MG) SUBCUTANEOUSLY ONCE A WEEK, Disp: 12 mL, Rfl: 1    Insulin Syringes, Disposable, U-100 1 ML MISC, 1 each by Does not apply route daily, Disp: 60 each, Rfl: 3

## 2024-02-27 ENCOUNTER — HOSPITAL ENCOUNTER (OUTPATIENT)
Dept: GENERAL RADIOLOGY | Age: 64
Discharge: HOME | End: 2024-02-29
Payer: COMMERCIAL

## 2024-02-27 VITALS — WEIGHT: 230 LBS | BODY MASS INDEX: 42.33 KG/M2 | HEIGHT: 62 IN

## 2024-02-27 DIAGNOSIS — Z12.31 BREAST CANCER SCREENING BY MAMMOGRAM: ICD-10-CM

## 2024-02-27 PROCEDURE — 77063 BREAST TOMOSYNTHESIS BI: CPT

## 2024-03-21 DIAGNOSIS — I10 ESSENTIAL HYPERTENSION: Chronic | ICD-10-CM

## 2024-03-21 NOTE — TELEPHONE ENCOUNTER
Patients last appointment 2/22/2024.  Patients next scheduled appointment   Future Appointments   Date Time Provider Department Center   4/24/2024  9:20 AM Louis Orourke DO BD [facility]   5/9/2024  8:45 AM Agustin Fields MD [facility]   8/22/2024  7:00 AM Evelio Mace DO [facility]

## 2024-03-22 RX ORDER — POTASSIUM CHLORIDE 20 MEQ/1
20 TABLET, EXTENDED RELEASE ORAL 2 TIMES DAILY
Qty: 180 TABLET | Refills: 3 | Status: SHIPPED | OUTPATIENT
Start: 2024-03-22

## 2024-03-27 DIAGNOSIS — G44.209 TENSION VASCULAR HEADACHE: ICD-10-CM

## 2024-03-27 DIAGNOSIS — G43.809 MIGRAINE VARIANT WITH HEADACHE: Chronic | ICD-10-CM

## 2024-03-27 NOTE — TELEPHONE ENCOUNTER
----- Message from Allyson Wiseman sent at 3/27/2024 11:35 AM EDT -----  Regarding: Refill for Topiramate   Contact: [phone]  25 MG 1 pill 2 times daily for migraines

## 2024-03-28 RX ORDER — TOPIRAMATE 25 MG/1
25 TABLET ORAL 2 TIMES DAILY
Qty: 180 TABLET | Refills: 3 | Status: SHIPPED | OUTPATIENT
Start: 2024-03-28 | End: 2025-03-23

## 2024-04-11 DIAGNOSIS — D84.9 IMMUNOSUPPRESSION (HCC): ICD-10-CM

## 2024-04-11 DIAGNOSIS — K50.90 CROHN'S RELATED ARTHRITIS (HCC): ICD-10-CM

## 2024-04-11 DIAGNOSIS — M07.60 CROHN'S RELATED ARTHRITIS (HCC): ICD-10-CM

## 2024-04-11 RX ORDER — METHOTREXATE 25 MG/ML
INJECTION, SOLUTION INTRA-ARTERIAL; INTRAMUSCULAR; INTRAVENOUS
Qty: 12 ML | Refills: 1 | Status: SHIPPED | OUTPATIENT
Start: 2024-04-11

## 2024-04-11 NOTE — TELEPHONE ENCOUNTER
[employer] message sent to patient with 's message on med refill.    Electronically signed by Sadia Bucio CMA on 4/11/2024 at 8:31 AM

## 2024-04-20 ENCOUNTER — HOSPITAL ENCOUNTER (OUTPATIENT)
Age: 64
Discharge: HOME | End: 2024-04-20
Payer: COMMERCIAL

## 2024-04-20 DIAGNOSIS — K50.90 CROHN'S RELATED ARTHRITIS (HCC): ICD-10-CM

## 2024-04-20 DIAGNOSIS — M07.60 CROHN'S RELATED ARTHRITIS (HCC): ICD-10-CM

## 2024-04-20 DIAGNOSIS — D84.9 IMMUNOSUPPRESSION (HCC): ICD-10-CM

## 2024-04-20 LAB
ALT SERPL-CCNC: 27 U/L (ref 0–32)
AST SERPL-CCNC: 29 U/L (ref 0–31)
BASOPHILS # BLD: 0.08 K/UL (ref 0–0.2)
BASOPHILS NFR BLD: 1 % (ref 0–2)
CREAT SERPL-MCNC: 0.8 MG/DL (ref 0.5–1)
CRP SERPL HS-MCNC: 8 MG/L (ref 0–5)
EOSINOPHIL # BLD: 0.25 K/UL (ref 0.05–0.5)
EOSINOPHILS RELATIVE PERCENT: 3 % (ref 0–6)
ERYTHROCYTE [DISTWIDTH] IN BLOOD BY AUTOMATED COUNT: 15.9 % (ref 11.5–15)
ERYTHROCYTE [SEDIMENTATION RATE] IN BLOOD BY WESTERGREN METHOD: 4 MM/HR (ref 0–20)
GFR SERPL CREATININE-BSD FRML MDRD: 81 ML/MIN/1.73M2
HCT VFR BLD AUTO: 46.8 % (ref 34–48)
HGB BLD-MCNC: 14.6 G/DL (ref 11.5–15.5)
IMM GRANULOCYTES # BLD AUTO: 0.04 K/UL (ref 0–0.58)
IMM GRANULOCYTES NFR BLD: 1 % (ref 0–5)
LYMPHOCYTES NFR BLD: 2.6 K/UL (ref 1.5–4)
LYMPHOCYTES RELATIVE PERCENT: 34 % (ref 20–42)
MCH RBC QN AUTO: 28.2 PG (ref 26–35)
MCHC RBC AUTO-ENTMCNC: 31.2 G/DL (ref 32–34.5)
MCV RBC AUTO: 90.3 FL (ref 80–99.9)
MONOCYTES NFR BLD: 0.61 K/UL (ref 0.1–0.95)
MONOCYTES NFR BLD: 8 % (ref 2–12)
NEUTROPHILS NFR BLD: 53 % (ref 43–80)
NEUTS SEG NFR BLD: 4.03 K/UL (ref 1.8–7.3)
PLATELET # BLD AUTO: 356 K/UL (ref 130–450)
PMV BLD AUTO: 10.1 FL (ref 7–12)
RBC # BLD AUTO: 5.18 M/UL (ref 3.5–5.5)
WBC OTHER # BLD: 7.6 K/UL (ref 4.5–11.5)

## 2024-04-20 PROCEDURE — 85652 RBC SED RATE AUTOMATED: CPT

## 2024-04-20 PROCEDURE — 85025 COMPLETE CBC W/AUTO DIFF WBC: CPT

## 2024-04-20 PROCEDURE — 84460 ALANINE AMINO (ALT) (SGPT): CPT

## 2024-04-20 PROCEDURE — 86140 C-REACTIVE PROTEIN: CPT

## 2024-04-20 PROCEDURE — 36415 COLL VENOUS BLD VENIPUNCTURE: CPT

## 2024-04-20 PROCEDURE — 84450 TRANSFERASE (AST) (SGOT): CPT

## 2024-04-20 PROCEDURE — 82565 ASSAY OF CREATININE: CPT

## 2024-04-22 DIAGNOSIS — K50.90 CROHN'S RELATED ARTHRITIS (HCC): ICD-10-CM

## 2024-04-22 DIAGNOSIS — M07.60 CROHN'S RELATED ARTHRITIS (HCC): ICD-10-CM

## 2024-04-22 RX ORDER — FOLIC ACID 1 MG/1
1 TABLET ORAL 2 TIMES DAILY
Qty: 180 TABLET | Refills: 5 | Status: SHIPPED | OUTPATIENT
Start: 2024-04-22

## 2024-04-24 ENCOUNTER — OFFICE VISIT (OUTPATIENT)
Dept: RHEUMATOLOGY | Age: 64
End: 2024-04-24
Payer: COMMERCIAL

## 2024-04-24 VITALS — HEIGHT: 62 IN | WEIGHT: 230 LBS | BODY MASS INDEX: 42.33 KG/M2

## 2024-04-24 DIAGNOSIS — K50.90 CROHN'S RELATED ARTHRITIS (HCC): Primary | ICD-10-CM

## 2024-04-24 DIAGNOSIS — M15.9 GENERALIZED OSTEOARTHRITIS: ICD-10-CM

## 2024-04-24 DIAGNOSIS — D84.9 IMMUNOSUPPRESSION (HCC): ICD-10-CM

## 2024-04-24 DIAGNOSIS — M07.60 CROHN'S RELATED ARTHRITIS (HCC): Primary | ICD-10-CM

## 2024-04-24 DIAGNOSIS — Z79.899 HIGH RISK MEDICATION USE: ICD-10-CM

## 2024-04-24 DIAGNOSIS — M81.0 AGE-RELATED OSTEOPOROSIS WITHOUT CURRENT PATHOLOGICAL FRACTURE: ICD-10-CM

## 2024-04-24 DIAGNOSIS — I10 ESSENTIAL HYPERTENSION: ICD-10-CM

## 2024-04-24 PROCEDURE — G8417 CALC BMI ABV UP PARAM F/U: HCPCS | Performed by: INTERNAL MEDICINE

## 2024-04-24 PROCEDURE — G2211 COMPLEX E/M VISIT ADD ON: HCPCS | Performed by: INTERNAL MEDICINE

## 2024-04-24 PROCEDURE — G8428 CUR MEDS NOT DOCUMENT: HCPCS | Performed by: INTERNAL MEDICINE

## 2024-04-24 PROCEDURE — 1036F TOBACCO NON-USER: CPT | Performed by: INTERNAL MEDICINE

## 2024-04-24 PROCEDURE — 99214 OFFICE O/P EST MOD 30 MIN: CPT | Performed by: INTERNAL MEDICINE

## 2024-04-24 PROCEDURE — 3017F COLORECTAL CA SCREEN DOC REV: CPT | Performed by: INTERNAL MEDICINE

## 2024-04-24 RX ORDER — DIPHENHYDRAMINE HYDROCHLORIDE 50 MG/ML
50 INJECTION INTRAMUSCULAR; INTRAVENOUS
OUTPATIENT
Start: 2024-04-24

## 2024-04-24 RX ORDER — FAMOTIDINE 10 MG/ML
20 INJECTION, SOLUTION INTRAVENOUS
OUTPATIENT
Start: 2024-04-24

## 2024-04-24 RX ORDER — ACETAMINOPHEN 325 MG/1
650 TABLET ORAL
OUTPATIENT
Start: 2024-04-24

## 2024-04-24 RX ORDER — ALBUTEROL SULFATE 90 UG/1
4 AEROSOL, METERED RESPIRATORY (INHALATION) PRN
OUTPATIENT
Start: 2024-04-24

## 2024-04-24 RX ORDER — SODIUM CHLORIDE 9 MG/ML
INJECTION, SOLUTION INTRAVENOUS CONTINUOUS
OUTPATIENT
Start: 2024-04-24

## 2024-04-24 RX ORDER — ONDANSETRON 2 MG/ML
8 INJECTION INTRAMUSCULAR; INTRAVENOUS
OUTPATIENT
Start: 2024-04-24

## 2024-04-24 RX ORDER — EPINEPHRINE 1 MG/ML
0.3 INJECTION, SOLUTION, CONCENTRATE INTRAVENOUS PRN
OUTPATIENT
Start: 2024-04-24

## 2024-04-24 ASSESSMENT — ENCOUNTER SYMPTOMS
ABDOMINAL PAIN: 0
VOMITING: 0
NAUSEA: 0
BACK PAIN: 1
SHORTNESS OF BREATH: 0
COLOR CHANGE: 0
TROUBLE SWALLOWING: 0
COUGH: 0

## 2024-04-24 NOTE — PATIENT INSTRUCTIONS
No changes to methotrexate or folic acid    Have Prolia    Have blood work every 3 months, next will be late July

## 2024-04-24 NOTE — PROGRESS NOTES
Allyson Wiseman 1960 is a 63 y.o. female, here for evaluation of the following chief complaint(s):  Follow-up (Patient here for follow up on Crohns related arthritis. Had a recent colonoscopy with [facility]. )      Assessment & Plan   ASSESSMENT/PLAN:    Allyson Wiseman 1960 is a 63 y.o. female seen in follow-up for Crohn's related inflammatory arthritis.    1.  Crohn's related inflammatory arthritis-doing pretty well from a joint standpoint on Stelara every 4 weeks from GI and methotrexate 25 mg injectable weekly plus folic acid 2 mg daily.  However, she had recent colonoscopy which did show active Crohn's.  She will be following up with GI and it sounds like they are planning on switching her to Skyrizi which is certainly fine with me.  Should help from a joint standpoint and she has actually had worsening psoriasis lately and Skyrizi works quite well for skin disease.  I see no synovitis, enthesitis, dactylitis on exam.  We will not make any changes from our end.    2.  Immunosuppression-I recommend she stay up-to-date on vaccinations.  In the event of any acute infectious illness she should hold Stelara and methotrexate.    3.  High risk medication use-we will need to monitor blood work every 3 months on methotrexate.  We will monitor for infection.    4.  Osteoarthritis-needs to avoid NSAIDs because of Crohn's disease.  Advised she can take Tylenol as needed not to exceed 3000 mg total in a day.    5.  Hypertension-patient's with inflammatory arthritis have an increased cardiovascular risk.  She is on blood pressure medication.    6.  Osteoporosis-on Reclast.  She has had 5 doses.  Still with osteoporosis on bone density scan from February 2023.  We will switch her to Prolia.  She is on vitamin D supplementation.      1. Crohn's related arthritis (HCC)  -     CBC with Auto Differential; Standing  -     ALT; Standing  -     AST; Standing  -     Creatinine; Standing  -     Sedimentation Rate;

## 2024-05-05 ENCOUNTER — APPOINTMENT (OUTPATIENT)
Dept: CT IMAGING | Age: 64
End: 2024-05-05
Payer: COMMERCIAL

## 2024-05-05 ENCOUNTER — APPOINTMENT (OUTPATIENT)
Dept: MRI IMAGING | Age: 64
End: 2024-05-05
Payer: COMMERCIAL

## 2024-05-05 ENCOUNTER — HOSPITAL ENCOUNTER (INPATIENT)
Age: 64
LOS: 3 days | DRG: 064 | End: 2024-05-08
Attending: INTERNAL MEDICINE | Admitting: INTERNAL MEDICINE
Payer: COMMERCIAL

## 2024-05-05 ENCOUNTER — APPOINTMENT (OUTPATIENT)
Dept: GENERAL RADIOLOGY | Age: 64
End: 2024-05-05
Payer: COMMERCIAL

## 2024-05-05 ENCOUNTER — HOSPITAL ENCOUNTER (EMERGENCY)
Age: 64
Discharge: ANOTHER HOSPITAL | End: 2024-05-05
Attending: EMERGENCY MEDICINE
Payer: COMMERCIAL

## 2024-05-05 VITALS
BODY MASS INDEX: 42.33 KG/M2 | WEIGHT: 230 LBS | RESPIRATION RATE: 16 BRPM | TEMPERATURE: 97.6 F | SYSTOLIC BLOOD PRESSURE: 138 MMHG | HEIGHT: 62 IN | HEART RATE: 62 BPM | OXYGEN SATURATION: 98 % | DIASTOLIC BLOOD PRESSURE: 84 MMHG

## 2024-05-05 DIAGNOSIS — I10 HYPERTENSION, UNSPECIFIED TYPE: ICD-10-CM

## 2024-05-05 DIAGNOSIS — I60.9 SAH (SUBARACHNOID HEMORRHAGE) (HCC): ICD-10-CM

## 2024-05-05 DIAGNOSIS — J96.01 ACUTE RESPIRATORY FAILURE WITH HYPOXIA (HCC): ICD-10-CM

## 2024-05-05 DIAGNOSIS — I63.9 ACUTE CVA (CEREBROVASCULAR ACCIDENT) (HCC): ICD-10-CM

## 2024-05-05 DIAGNOSIS — Q21.12 PATENT FORAMEN OVALE: ICD-10-CM

## 2024-05-05 DIAGNOSIS — Z79.899 HIGH RISK MEDICATION USE: Primary | ICD-10-CM

## 2024-05-05 DIAGNOSIS — I63.9 CEREBROVASCULAR ACCIDENT (CVA), UNSPECIFIED MECHANISM (HCC): Primary | ICD-10-CM

## 2024-05-05 LAB
ABO + RH BLD: NORMAL
ALBUMIN SERPL-MCNC: 4.3 G/DL (ref 3.5–5.2)
ALP SERPL-CCNC: 58 U/L (ref 35–104)
ALT SERPL-CCNC: 18 U/L (ref 0–32)
ANION GAP SERPL CALCULATED.3IONS-SCNC: 9 MMOL/L (ref 7–16)
ARM BAND NUMBER: NORMAL
AST SERPL-CCNC: 24 U/L (ref 0–31)
BASOPHILS # BLD: 0.08 K/UL (ref 0–0.2)
BASOPHILS NFR BLD: 1 % (ref 0–2)
BILIRUB SERPL-MCNC: 0.6 MG/DL (ref 0–1.2)
BILIRUB UR QL STRIP: NEGATIVE
BLOOD BANK SAMPLE EXPIRATION: NORMAL
BLOOD GROUP ANTIBODIES SERPL: NEGATIVE
BUN SERPL-MCNC: 11 MG/DL (ref 6–23)
CALCIUM SERPL-MCNC: 10.3 MG/DL (ref 8.6–10.2)
CHLORIDE SERPL-SCNC: 108 MMOL/L (ref 98–107)
CLARITY UR: ABNORMAL
CO2 SERPL-SCNC: 23 MMOL/L (ref 22–29)
COLOR UR: YELLOW
CREAT SERPL-MCNC: 1 MG/DL (ref 0.5–1)
EOSINOPHIL # BLD: 0.25 K/UL (ref 0.05–0.5)
EOSINOPHILS RELATIVE PERCENT: 3 % (ref 0–6)
ERYTHROCYTE [DISTWIDTH] IN BLOOD BY AUTOMATED COUNT: 16.1 % (ref 11.5–15)
ERYTHROCYTE [DISTWIDTH] IN BLOOD BY AUTOMATED COUNT: 16.1 % (ref 11.5–15)
FIBRINOGEN PPP-MCNC: 381 MG/DL (ref 200–400)
GFR, ESTIMATED: 66 ML/MIN/1.73M2
GLUCOSE BLD-MCNC: 121 MG/DL (ref 74–99)
GLUCOSE SERPL-MCNC: 117 MG/DL (ref 74–99)
GLUCOSE UR STRIP-MCNC: NEGATIVE MG/DL
HCT VFR BLD AUTO: 44.4 % (ref 34–48)
HCT VFR BLD AUTO: 46.9 % (ref 34–48)
HGB BLD-MCNC: 14.3 G/DL (ref 11.5–15.5)
HGB BLD-MCNC: 15.3 G/DL (ref 11.5–15.5)
HGB UR QL STRIP.AUTO: ABNORMAL
IMM GRANULOCYTES # BLD AUTO: 0.05 K/UL (ref 0–0.58)
IMM GRANULOCYTES NFR BLD: 1 % (ref 0–5)
INR PPP: 1.1
KETONES UR STRIP-MCNC: NEGATIVE MG/DL
LEUKOCYTE ESTERASE UR QL STRIP: NEGATIVE
LYMPHOCYTES NFR BLD: 2.61 K/UL (ref 1.5–4)
LYMPHOCYTES RELATIVE PERCENT: 28 % (ref 20–42)
MCH RBC QN AUTO: 28.7 PG (ref 26–35)
MCH RBC QN AUTO: 28.7 PG (ref 26–35)
MCHC RBC AUTO-ENTMCNC: 32.2 G/DL (ref 32–34.5)
MCHC RBC AUTO-ENTMCNC: 32.6 G/DL (ref 32–34.5)
MCV RBC AUTO: 88 FL (ref 80–99.9)
MCV RBC AUTO: 89 FL (ref 80–99.9)
MONOCYTES NFR BLD: 0.78 K/UL (ref 0.1–0.95)
MONOCYTES NFR BLD: 8 % (ref 2–12)
NEUTROPHILS NFR BLD: 60 % (ref 43–80)
NEUTS SEG NFR BLD: 5.58 K/UL (ref 1.8–7.3)
NITRITE UR QL STRIP: NEGATIVE
PARTIAL THROMBOPLASTIN TIME: 30.1 SEC (ref 24.5–35.1)
PH UR STRIP: 7 [PH] (ref 5–9)
PLATELET # BLD AUTO: 299 K/UL (ref 130–450)
PLATELET # BLD AUTO: 316 K/UL (ref 130–450)
PMV BLD AUTO: 10.8 FL (ref 7–12)
PMV BLD AUTO: 9.9 FL (ref 7–12)
POTASSIUM SERPL-SCNC: 3.7 MMOL/L (ref 3.5–5)
PROT SERPL-MCNC: 7.8 G/DL (ref 6.4–8.3)
PROT UR STRIP-MCNC: ABNORMAL MG/DL
PROTHROMBIN TIME: 12.6 SEC (ref 9.3–12.4)
RBC # BLD AUTO: 4.99 M/UL (ref 3.5–5.5)
RBC # BLD AUTO: 5.33 M/UL (ref 3.5–5.5)
RBC #/AREA URNS HPF: ABNORMAL /HPF
SODIUM SERPL-SCNC: 140 MMOL/L (ref 132–146)
SP GR UR STRIP: 1.01 (ref 1–1.03)
TROPONIN I SERPL HS-MCNC: 8 NG/L (ref 0–9)
UROBILINOGEN UR STRIP-ACNC: 0.2 EU/DL (ref 0–1)
WBC #/AREA URNS HPF: ABNORMAL /HPF
WBC OTHER # BLD: 12.3 K/UL (ref 4.5–11.5)
WBC OTHER # BLD: 9.4 K/UL (ref 4.5–11.5)
YEAST URNS QL MICRO: PRESENT

## 2024-05-05 PROCEDURE — 6360000002 HC RX W HCPCS

## 2024-05-05 PROCEDURE — 2500000003 HC RX 250 WO HCPCS

## 2024-05-05 PROCEDURE — 86900 BLOOD TYPING SEROLOGIC ABO: CPT

## 2024-05-05 PROCEDURE — 85384 FIBRINOGEN ACTIVITY: CPT

## 2024-05-05 PROCEDURE — 70450 CT HEAD/BRAIN W/O DYE: CPT

## 2024-05-05 PROCEDURE — 2580000003 HC RX 258

## 2024-05-05 PROCEDURE — 86901 BLOOD TYPING SEROLOGIC RH(D): CPT

## 2024-05-05 PROCEDURE — 94002 VENT MGMT INPAT INIT DAY: CPT

## 2024-05-05 PROCEDURE — 2060000000 HC ICU INTERMEDIATE R&B

## 2024-05-05 PROCEDURE — 96365 THER/PROPH/DIAG IV INF INIT: CPT

## 2024-05-05 PROCEDURE — P9012 CRYOPRECIPITATE EACH UNIT: HCPCS

## 2024-05-05 PROCEDURE — 85610 PROTHROMBIN TIME: CPT

## 2024-05-05 PROCEDURE — 2580000003 HC RX 258: Performed by: INTERNAL MEDICINE

## 2024-05-05 PROCEDURE — 93005 ELECTROCARDIOGRAM TRACING: CPT | Performed by: EMERGENCY MEDICINE

## 2024-05-05 PROCEDURE — 96367 TX/PROPH/DG ADDL SEQ IV INF: CPT

## 2024-05-05 PROCEDURE — 51702 INSERT TEMP BLADDER CATH: CPT

## 2024-05-05 PROCEDURE — 81001 URINALYSIS AUTO W/SCOPE: CPT

## 2024-05-05 PROCEDURE — 36430 TRANSFUSION BLD/BLD COMPNT: CPT

## 2024-05-05 PROCEDURE — 36556 INSERT NON-TUNNEL CV CATH: CPT

## 2024-05-05 PROCEDURE — 85025 COMPLETE CBC W/AUTO DIFF WBC: CPT

## 2024-05-05 PROCEDURE — 99285 EMERGENCY DEPT VISIT HI MDM: CPT

## 2024-05-05 PROCEDURE — 99291 CRITICAL CARE FIRST HOUR: CPT

## 2024-05-05 PROCEDURE — 84484 ASSAY OF TROPONIN QUANT: CPT

## 2024-05-05 PROCEDURE — 70551 MRI BRAIN STEM W/O DYE: CPT

## 2024-05-05 PROCEDURE — 85730 THROMBOPLASTIN TIME PARTIAL: CPT

## 2024-05-05 PROCEDURE — 85390 FIBRINOLYSINS SCREEN I&R: CPT

## 2024-05-05 PROCEDURE — 70496 CT ANGIOGRAPHY HEAD: CPT

## 2024-05-05 PROCEDURE — 85576 BLOOD PLATELET AGGREGATION: CPT

## 2024-05-05 PROCEDURE — 2500000003 HC RX 250 WO HCPCS: Performed by: EMERGENCY MEDICINE

## 2024-05-05 PROCEDURE — 85347 COAGULATION TIME ACTIVATED: CPT

## 2024-05-05 PROCEDURE — 71045 X-RAY EXAM CHEST 1 VIEW: CPT

## 2024-05-05 PROCEDURE — 96376 TX/PRO/DX INJ SAME DRUG ADON: CPT

## 2024-05-05 PROCEDURE — 6360000004 HC RX CONTRAST MEDICATION: Performed by: RADIOLOGY

## 2024-05-05 PROCEDURE — 80053 COMPREHEN METABOLIC PANEL: CPT

## 2024-05-05 PROCEDURE — 82962 GLUCOSE BLOOD TEST: CPT

## 2024-05-05 PROCEDURE — 70498 CT ANGIOGRAPHY NECK: CPT

## 2024-05-05 PROCEDURE — 96366 THER/PROPH/DIAG IV INF ADDON: CPT

## 2024-05-05 PROCEDURE — 86850 RBC ANTIBODY SCREEN: CPT

## 2024-05-05 PROCEDURE — 85027 COMPLETE CBC AUTOMATED: CPT

## 2024-05-05 PROCEDURE — 37195 THROMBOLYTIC THERAPY STROKE: CPT

## 2024-05-05 PROCEDURE — 6360000002 HC RX W HCPCS: Performed by: EMERGENCY MEDICINE

## 2024-05-05 PROCEDURE — 96375 TX/PRO/DX INJ NEW DRUG ADDON: CPT

## 2024-05-05 PROCEDURE — 86927 PLASMA FRESH FROZEN: CPT

## 2024-05-05 PROCEDURE — 31500 INSERT EMERGENCY AIRWAY: CPT

## 2024-05-05 RX ORDER — FOLIC ACID 1 MG/1
1 TABLET ORAL 2 TIMES DAILY
Status: DISCONTINUED | OUTPATIENT
Start: 2024-05-05 | End: 2024-05-06

## 2024-05-05 RX ORDER — ASPIRIN 81 MG/1
81 TABLET, CHEWABLE ORAL DAILY
Status: DISCONTINUED | OUTPATIENT
Start: 2024-05-06 | End: 2024-05-05

## 2024-05-05 RX ORDER — DULOXETIN HYDROCHLORIDE 30 MG/1
60 CAPSULE, DELAYED RELEASE ORAL DAILY
Status: DISCONTINUED | OUTPATIENT
Start: 2024-05-06 | End: 2024-05-06

## 2024-05-05 RX ORDER — SODIUM CHLORIDE 0.9 % (FLUSH) 0.9 %
5-40 SYRINGE (ML) INJECTION EVERY 12 HOURS SCHEDULED
Status: DISCONTINUED | OUTPATIENT
Start: 2024-05-05 | End: 2024-05-08 | Stop reason: HOSPADM

## 2024-05-05 RX ORDER — LEVETIRACETAM 500 MG/5ML
500 INJECTION, SOLUTION, CONCENTRATE INTRAVENOUS EVERY 12 HOURS
Status: DISCONTINUED | OUTPATIENT
Start: 2024-05-05 | End: 2024-05-06

## 2024-05-05 RX ORDER — ETOMIDATE 2 MG/ML
INJECTION INTRAVENOUS
Status: COMPLETED
Start: 2024-05-05 | End: 2024-05-05

## 2024-05-05 RX ORDER — LOSARTAN POTASSIUM 50 MG/1
100 TABLET ORAL DAILY
Status: DISCONTINUED | OUTPATIENT
Start: 2024-05-06 | End: 2024-05-06

## 2024-05-05 RX ORDER — LANOLIN ALCOHOL/MO/W.PET/CERES
100 CREAM (GRAM) TOPICAL DAILY
Status: DISCONTINUED | OUTPATIENT
Start: 2024-05-06 | End: 2024-05-06

## 2024-05-05 RX ORDER — LEVETIRACETAM 500 MG/5ML
1500 INJECTION, SOLUTION, CONCENTRATE INTRAVENOUS ONCE
Status: COMPLETED | OUTPATIENT
Start: 2024-05-05 | End: 2024-05-05

## 2024-05-05 RX ORDER — MANNITOL 20 G/100ML
100 INJECTION, SOLUTION INTRAVENOUS ONCE
Status: COMPLETED | OUTPATIENT
Start: 2024-05-05 | End: 2024-05-05

## 2024-05-05 RX ORDER — SODIUM CHLORIDE 0.9 % (FLUSH) 0.9 %
5-40 SYRINGE (ML) INJECTION PRN
Status: DISCONTINUED | OUTPATIENT
Start: 2024-05-05 | End: 2024-05-05 | Stop reason: HOSPADM

## 2024-05-05 RX ORDER — LABETALOL HYDROCHLORIDE 5 MG/ML
10 INJECTION, SOLUTION INTRAVENOUS EVERY 30 MIN PRN
Status: DISCONTINUED | OUTPATIENT
Start: 2024-05-05 | End: 2024-05-06

## 2024-05-05 RX ORDER — POTASSIUM CHLORIDE 20 MEQ/1
20 TABLET, EXTENDED RELEASE ORAL 2 TIMES DAILY
Status: DISCONTINUED | OUTPATIENT
Start: 2024-05-05 | End: 2024-05-06

## 2024-05-05 RX ORDER — HYDRALAZINE HYDROCHLORIDE 20 MG/ML
10 INJECTION INTRAMUSCULAR; INTRAVENOUS EVERY 30 MIN PRN
Status: DISCONTINUED | OUTPATIENT
Start: 2024-05-05 | End: 2024-05-06

## 2024-05-05 RX ORDER — LABETALOL HYDROCHLORIDE 5 MG/ML
10 INJECTION, SOLUTION INTRAVENOUS ONCE
Status: COMPLETED | OUTPATIENT
Start: 2024-05-05 | End: 2024-05-05

## 2024-05-05 RX ORDER — SUCCINYLCHOLINE CHLORIDE 20 MG/ML
INJECTION INTRAMUSCULAR; INTRAVENOUS
Status: COMPLETED
Start: 2024-05-05 | End: 2024-05-05

## 2024-05-05 RX ORDER — 3% SODIUM CHLORIDE 3 G/100ML
25 INJECTION, SOLUTION INTRAVENOUS CONTINUOUS
Status: DISPENSED | OUTPATIENT
Start: 2024-05-05 | End: 2024-05-07

## 2024-05-05 RX ORDER — LEVOTHYROXINE SODIUM 0.12 MG/1
125 TABLET ORAL DAILY
Status: DISCONTINUED | OUTPATIENT
Start: 2024-05-06 | End: 2024-05-06

## 2024-05-05 RX ORDER — ONDANSETRON 2 MG/ML
4 INJECTION INTRAMUSCULAR; INTRAVENOUS EVERY 6 HOURS PRN
Status: DISCONTINUED | OUTPATIENT
Start: 2024-05-05 | End: 2024-05-08 | Stop reason: HOSPADM

## 2024-05-05 RX ORDER — ASPIRIN 300 MG/1
300 SUPPOSITORY RECTAL DAILY
Status: DISCONTINUED | OUTPATIENT
Start: 2024-05-06 | End: 2024-05-05

## 2024-05-05 RX ORDER — SODIUM CHLORIDE 9 MG/ML
INJECTION, SOLUTION INTRAVENOUS PRN
Status: DISCONTINUED | OUTPATIENT
Start: 2024-05-05 | End: 2024-05-05 | Stop reason: HOSPADM

## 2024-05-05 RX ORDER — CETIRIZINE HYDROCHLORIDE 10 MG/1
10 TABLET ORAL DAILY
Status: DISCONTINUED | OUTPATIENT
Start: 2024-05-06 | End: 2024-05-06

## 2024-05-05 RX ORDER — ONDANSETRON 4 MG/1
4 TABLET, ORALLY DISINTEGRATING ORAL EVERY 8 HOURS PRN
Status: DISCONTINUED | OUTPATIENT
Start: 2024-05-05 | End: 2024-05-08 | Stop reason: HOSPADM

## 2024-05-05 RX ORDER — PROPOFOL 10 MG/ML
5-50 INJECTION, EMULSION INTRAVENOUS
Status: DISCONTINUED | OUTPATIENT
Start: 2024-05-05 | End: 2024-05-06

## 2024-05-05 RX ORDER — SODIUM CHLORIDE 0.9 % (FLUSH) 0.9 %
10 SYRINGE (ML) INJECTION ONCE
Status: COMPLETED | OUTPATIENT
Start: 2024-05-05 | End: 2024-05-05

## 2024-05-05 RX ORDER — FENTANYL CITRATE-0.9 % NACL/PF 10 MCG/ML
25-200 PLASTIC BAG, INJECTION (ML) INTRAVENOUS CONTINUOUS
Status: DISCONTINUED | OUTPATIENT
Start: 2024-05-05 | End: 2024-05-05 | Stop reason: HOSPADM

## 2024-05-05 RX ORDER — PROPOFOL 10 MG/ML
INJECTION, EMULSION INTRAVENOUS
Status: DISCONTINUED
Start: 2024-05-05 | End: 2024-05-05 | Stop reason: HOSPADM

## 2024-05-05 RX ORDER — SODIUM CHLORIDE 9 MG/ML
INJECTION, SOLUTION INTRAVENOUS CONTINUOUS
Status: DISCONTINUED | OUTPATIENT
Start: 2024-05-05 | End: 2024-05-06

## 2024-05-05 RX ORDER — POLYETHYLENE GLYCOL 3350 17 G/17G
17 POWDER, FOR SOLUTION ORAL DAILY PRN
Status: DISCONTINUED | OUTPATIENT
Start: 2024-05-05 | End: 2024-05-06

## 2024-05-05 RX ORDER — LORAZEPAM 2 MG/ML
0.5 INJECTION INTRAMUSCULAR ONCE
Status: COMPLETED | OUTPATIENT
Start: 2024-05-05 | End: 2024-05-05

## 2024-05-05 RX ORDER — ATORVASTATIN CALCIUM 40 MG/1
80 TABLET, FILM COATED ORAL NIGHTLY
Status: DISCONTINUED | OUTPATIENT
Start: 2024-05-05 | End: 2024-05-06

## 2024-05-05 RX ORDER — SODIUM CHLORIDE 9 MG/ML
INJECTION, SOLUTION INTRAVENOUS PRN
Status: DISCONTINUED | OUTPATIENT
Start: 2024-05-05 | End: 2024-05-08 | Stop reason: HOSPADM

## 2024-05-05 RX ORDER — LORAZEPAM 2 MG/ML
1 INJECTION INTRAMUSCULAR ONCE
Status: COMPLETED | OUTPATIENT
Start: 2024-05-05 | End: 2024-05-05

## 2024-05-05 RX ORDER — LORAZEPAM 2 MG/ML
INJECTION INTRAMUSCULAR
Status: COMPLETED
Start: 2024-05-05 | End: 2024-05-05

## 2024-05-05 RX ORDER — SODIUM CHLORIDE 0.9 % (FLUSH) 0.9 %
5-40 SYRINGE (ML) INJECTION PRN
Status: DISCONTINUED | OUTPATIENT
Start: 2024-05-05 | End: 2024-05-08 | Stop reason: HOSPADM

## 2024-05-05 RX ORDER — ENOXAPARIN SODIUM 100 MG/ML
40 INJECTION SUBCUTANEOUS DAILY
Status: DISCONTINUED | OUTPATIENT
Start: 2024-05-06 | End: 2024-05-05

## 2024-05-05 RX ORDER — LORAZEPAM 2 MG/ML
0.5 INJECTION INTRAMUSCULAR ONCE
Status: DISCONTINUED | OUTPATIENT
Start: 2024-05-05 | End: 2024-05-05

## 2024-05-05 RX ORDER — TOPIRAMATE 25 MG/1
25 TABLET ORAL 2 TIMES DAILY
Status: DISCONTINUED | OUTPATIENT
Start: 2024-05-05 | End: 2024-05-06

## 2024-05-05 RX ORDER — SODIUM CHLORIDE 0.9 % (FLUSH) 0.9 %
5-40 SYRINGE (ML) INJECTION EVERY 12 HOURS SCHEDULED
Status: DISCONTINUED | OUTPATIENT
Start: 2024-05-05 | End: 2024-05-05 | Stop reason: HOSPADM

## 2024-05-05 RX ORDER — FENTANYL CITRATE-0.9 % NACL/PF 20 MCG/2ML
50 SYRINGE (ML) INTRAVENOUS EVERY 30 MIN PRN
Status: DISCONTINUED | OUTPATIENT
Start: 2024-05-05 | End: 2024-05-05 | Stop reason: HOSPADM

## 2024-05-05 RX ORDER — LIDOCAINE HYDROCHLORIDE 10 MG/ML
INJECTION, SOLUTION INFILTRATION; PERINEURAL
Status: COMPLETED
Start: 2024-05-05 | End: 2024-05-06

## 2024-05-05 RX ORDER — TRANEXAMIC ACID 10 MG/ML
1000 INJECTION, SOLUTION INTRAVENOUS ONCE
Status: COMPLETED | OUTPATIENT
Start: 2024-05-05 | End: 2024-05-05

## 2024-05-05 RX ORDER — PANTOPRAZOLE SODIUM 40 MG/1
40 TABLET, DELAYED RELEASE ORAL
Status: DISCONTINUED | OUTPATIENT
Start: 2024-05-06 | End: 2024-05-06

## 2024-05-05 RX ADMIN — LABETALOL HYDROCHLORIDE 10 MG: 5 INJECTION INTRAVENOUS at 15:04

## 2024-05-05 RX ADMIN — LABETALOL HYDROCHLORIDE 10 MG: 5 INJECTION INTRAVENOUS at 14:49

## 2024-05-05 RX ADMIN — SODIUM CHLORIDE, PRESERVATIVE FREE 10 ML: 5 INJECTION INTRAVENOUS at 15:05

## 2024-05-05 RX ADMIN — SODIUM CHLORIDE, PRESERVATIVE FREE 10 ML: 5 INJECTION INTRAVENOUS at 22:00

## 2024-05-05 RX ADMIN — ETOMIDATE 20 MG: 2 INJECTION, SOLUTION INTRAVENOUS at 18:37

## 2024-05-05 RX ADMIN — TRANEXAMIC ACID 1000 MG: 10 INJECTION, SOLUTION INTRAVENOUS at 17:03

## 2024-05-05 RX ADMIN — LORAZEPAM 2 MG: 2 INJECTION INTRAMUSCULAR; INTRAVENOUS at 18:02

## 2024-05-05 RX ADMIN — LORAZEPAM 0.5 MG: 2 INJECTION INTRAMUSCULAR; INTRAVENOUS at 23:50

## 2024-05-05 RX ADMIN — SODIUM CHLORIDE, PRESERVATIVE FREE 10 ML: 5 INJECTION INTRAVENOUS at 14:52

## 2024-05-05 RX ADMIN — MANNITOL 100 G: 20 INJECTION, SOLUTION INTRAVENOUS at 20:02

## 2024-05-05 RX ADMIN — LORAZEPAM 0.5 MG: 2 INJECTION INTRAMUSCULAR; INTRAVENOUS at 23:15

## 2024-05-05 RX ADMIN — LORAZEPAM 0.5 MG: 2 INJECTION INTRAMUSCULAR; INTRAVENOUS at 17:03

## 2024-05-05 RX ADMIN — SUCCINYLCHOLINE CHLORIDE 100 MG: 20 INJECTION, SOLUTION INTRAMUSCULAR; INTRAVENOUS at 18:37

## 2024-05-05 RX ADMIN — HYDRALAZINE HYDROCHLORIDE 10 MG: 20 INJECTION INTRAMUSCULAR; INTRAVENOUS at 22:15

## 2024-05-05 RX ADMIN — SODIUM CHLORIDE 2.5 MG/HR: 9 INJECTION, SOLUTION INTRAVENOUS at 15:35

## 2024-05-05 RX ADMIN — LEVETIRACETAM 1500 MG: 100 INJECTION, SOLUTION INTRAVENOUS at 16:34

## 2024-05-05 RX ADMIN — Medication 25 MG: at 14:50

## 2024-05-05 RX ADMIN — IOPAMIDOL 75 ML: 755 INJECTION, SOLUTION INTRAVENOUS at 14:33

## 2024-05-05 ASSESSMENT — LIFESTYLE VARIABLES
HOW MANY STANDARD DRINKS CONTAINING ALCOHOL DO YOU HAVE ON A TYPICAL DAY: PATIENT DOES NOT DRINK
HOW OFTEN DO YOU HAVE A DRINK CONTAINING ALCOHOL: NEVER

## 2024-05-05 ASSESSMENT — PULMONARY FUNCTION TESTS
PIF_VALUE: 21
PIF_VALUE: 21

## 2024-05-05 NOTE — PROGRESS NOTES
@0756 access center notified of Stroke Alert/  Speak with Telestroke  Right side deficits/ R arm R leg weakness  LKW 1330  [facility] 4  @2375 Dr Borjas spoke with Dr Ramirez/telestroke

## 2024-05-05 NOTE — VIRTUAL HEALTH
Allyson Wiseman, was evaluated through a synchronous (real-time) audio-video encounter. The patient (and/or guardian if applicable) is aware that this is a billable service, which includes applicable co-pays. This virtual visit was conducted with patient's (and/or legal guardian's) consent. Patient identification was verified, and a caregiver was present when appropriate.  The patient was located at Facility (Appt Department): [facility] EMERGENCY DEPARTMENT  [address] [facility] [address]  Loc: [phone]  The provider was located at Facility (Login Dept): [facility] TELESTROKE  [address] [facility] [id number] [phone]  Confirm you are appropriately licensed, registered, or certified to deliver care in the [facility] where the patient is located as indicated above. If you are not or unsure, please re-schedule the visit: Yes, I confirm.   Consults     Total time spent on this encounter:  35    --Mike Ramirez MD on 5/5/2024 at 2:43 PM    An electronic signature was used to authenticate this note.      [facility] Stroke and Telestroke Consult for  [facility] Stroke Alert through [facility] @ 2:35pm  5/5/2024 2:43 PM    Pt Name: Allyson Wiseman  MRN: [id number]  YOB: 1960  Date of evaluation: 5/5/2024  Primary Care Physician: Evelio Mace DO  Reason for Evaluation: Stroke Evaluation with Discussion with Ed or primary team with Telemedicine and stroke evaluation with Review of imaging and labs    Allyson Wiseman is a 63 y.o. female with Cohrn disease, p/w acute onset of right side numbness and difficulty controling it since abouat 1 pm.    LKW: 1pm  NIH:  4  - right arm ataxia  - right heel to shin ataxia  - right arm drift  - right leg weakness    Allergies  is allergic to biaxin [clarithromycin], cefaclor, clavulanic acid, doxycycline, and macrobid [nitrofurantoin].  Medications  Prior to Admission

## 2024-05-05 NOTE — ED PROVIDER NOTES
HPI:  5/5/24, Time: 2:16 PM EDT         Allyson Wiseman is a 63 y.o. female presenting to the ED for strokelike symptoms.  Last known well was 1 PM today.  Patient reports sudden onset right arm and leg numbness and weakness.  No falls or trauma.  No headache, chest pain, shortness of breath, abdominal pain, nausea, vomiting, no prior history of stroke.  Patient is not anticoagulated.    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    Past Surgical History:  has a past surgical history that includes Cholecystectomy; Tubal ligation; hernia repair; Endometrial ablation; Tonsillectomy; Colonoscopy; Abdomen surgery (11/2/14); Total knee arthroplasty (Bilateral, 2013); Gastrostomy tube placement; Tunneled venous catheter placement; colectomy; colectomy; joint replacement (11/2013); Jejunostomy; Carpal tunnel release; and XR MIDLINE EQUAL OR GREATER THAN 5 YEARS (3/18/2015).    Social History:  reports that she has never smoked. She has never used smokeless tobacco. She reports that she does not currently use alcohol. She reports that she does not use drugs.    Family History: family history includes Bone Cancer in her maternal grandfather; Hypertension in her mother.     The patient’s home

## 2024-05-05 NOTE — ED NOTES
Patient family hit the call light stating the patient is slurring her words and feeling more confused. Dr Borjas and Dr Larkin notified. Orders taken

## 2024-05-05 NOTE — PROGRESS NOTES
Repeat CT head showed at 7:17pm showed right frontal IPH on the top of the diffuse cortical SAH.    Recommend keep SBP below 140/90 at all time.    Keppra load to prevent seizure. Unclear if patient had as seizure.      Reggie Ramirez MD

## 2024-05-05 NOTE — PROGRESS NOTES
Radiology Procedure Waiver   Name: Allyson Wiseman  : 1960  MRN: [id number]    Date:  24    Time: 2:15 PM EDT    Benefits of immediately proceeding with radiology exam(s) without pre-testing outweigh the risks or are not indicated as specified below and therefore the following is/are being waived:    [x] Benefits of immediate radiology exam(s) outweigh any risk.                                               OR    Pre-exam testing is not indicated for the following reason(s):  [] Pregnancy test   [] Patients LMP on-time and regular.   [] Patient had Tubal Ligation or has other Contraception Device.   [] Patient  is Menopausal or Premenarcheal.    [] Patient had Full or Partial Hysterectomy.    [] Protocol for CT contrast allegry   [] Patient has tolerated well previously   [] Patient does not have a true allergy    [] MRI Questionnaire     [] BUN/Creatinine   [] Patient age w/no hx of renal dysfunction.   [] Patient on Dialysis.   [] Recent Normal Labs.  Electronically signed by Brooks Borjas DO on 24 at 2:15 PM EDT

## 2024-05-06 ENCOUNTER — APPOINTMENT (OUTPATIENT)
Age: 64
DRG: 064 | End: 2024-05-06
Attending: INTERNAL MEDICINE
Payer: COMMERCIAL

## 2024-05-06 ENCOUNTER — APPOINTMENT (OUTPATIENT)
Dept: GENERAL RADIOLOGY | Age: 64
DRG: 064 | End: 2024-05-06
Attending: INTERNAL MEDICINE
Payer: COMMERCIAL

## 2024-05-06 ENCOUNTER — APPOINTMENT (OUTPATIENT)
Dept: NEUROLOGY | Age: 64
DRG: 064 | End: 2024-05-06
Attending: INTERNAL MEDICINE
Payer: COMMERCIAL

## 2024-05-06 ENCOUNTER — APPOINTMENT (OUTPATIENT)
Dept: CT IMAGING | Age: 64
DRG: 064 | End: 2024-05-06
Attending: INTERNAL MEDICINE
Payer: COMMERCIAL

## 2024-05-06 PROBLEM — E87.8 ELECTROLYTE IMBALANCE: Status: ACTIVE | Noted: 2024-05-06

## 2024-05-06 PROBLEM — J96.01 ACUTE RESPIRATORY FAILURE WITH HYPOXIA AND HYPERCAPNIA (HCC): Status: ACTIVE | Noted: 2024-05-06

## 2024-05-06 PROBLEM — J96.02 ACUTE RESPIRATORY FAILURE WITH HYPOXIA AND HYPERCAPNIA (HCC): Status: ACTIVE | Noted: 2024-05-06

## 2024-05-06 PROBLEM — I61.9 INTRAPARENCHYMAL HEMORRHAGE OF BRAIN (HCC): Status: ACTIVE | Noted: 2024-05-06

## 2024-05-06 PROBLEM — R13.12 OROPHARYNGEAL DYSPHAGIA: Status: ACTIVE | Noted: 2024-05-06

## 2024-05-06 PROBLEM — I16.1 HYPERTENSIVE EMERGENCY: Status: ACTIVE | Noted: 2024-05-06

## 2024-05-06 PROBLEM — I60.9 SUBARACHNOID HEMORRHAGE (HCC): Status: ACTIVE | Noted: 2024-05-06

## 2024-05-06 PROBLEM — T45.615A: Status: ACTIVE | Noted: 2024-05-06

## 2024-05-06 LAB
AADO2: 194.3 MMHG
AADO2: 344.4 MMHG
ANION GAP SERPL CALCULATED.3IONS-SCNC: 13 MMOL/L (ref 7–16)
ANION GAP SERPL CALCULATED.3IONS-SCNC: 14 MMOL/L (ref 7–16)
ANION GAP SERPL CALCULATED.3IONS-SCNC: 15 MMOL/L (ref 7–16)
ARM BAND NUMBER: NORMAL
B.E.: -2.5 MMOL/L (ref -3–3)
B.E.: -3.3 MMOL/L (ref -3–3)
BASOPHILS # BLD: 0.06 K/UL (ref 0–0.2)
BASOPHILS NFR BLD: 0 % (ref 0–2)
BLOOD BANK BLOOD PRODUCT EXPIRATION DATE: NORMAL
BLOOD BANK DISPENSE STATUS: NORMAL
BLOOD BANK ISBT PRODUCT BLOOD TYPE: 6200
BLOOD BANK PRODUCT CODE: NORMAL
BLOOD BANK UNIT TYPE AND RH: NORMAL
BPU ID: NORMAL
BUN SERPL-MCNC: 10 MG/DL (ref 6–23)
BUN SERPL-MCNC: 11 MG/DL (ref 6–23)
BUN SERPL-MCNC: 11 MG/DL (ref 6–23)
CALCIUM SERPL-MCNC: 10 MG/DL (ref 8.6–10.2)
CALCIUM SERPL-MCNC: 9.5 MG/DL (ref 8.6–10.2)
CALCIUM SERPL-MCNC: 9.9 MG/DL (ref 8.6–10.2)
CHLORIDE SERPL-SCNC: 105 MMOL/L (ref 98–107)
CHLORIDE SERPL-SCNC: 109 MMOL/L (ref 98–107)
CHLORIDE SERPL-SCNC: 112 MMOL/L (ref 98–107)
CHOLEST SERPL-MCNC: 163 MG/DL
CLOT ANGLE.KAOLIN INDUCED BLD RES TEG: 79.2 DEG (ref 53–70)
CO2 SERPL-SCNC: 18 MMOL/L (ref 22–29)
CO2 SERPL-SCNC: 19 MMOL/L (ref 22–29)
CO2 SERPL-SCNC: 22 MMOL/L (ref 22–29)
COHB: 0.2 % (ref 0–1.5)
COHB: 0.3 % (ref 0–1.5)
COMPONENT: NORMAL
CREAT SERPL-MCNC: 0.7 MG/DL (ref 0.5–1)
CRITICAL: ABNORMAL
CRITICAL: ABNORMAL
DATE ANALYZED: ABNORMAL
DATE ANALYZED: ABNORMAL
DATE OF COLLECTION: ABNORMAL
DATE OF COLLECTION: ABNORMAL
EOSINOPHIL # BLD: 0.03 K/UL (ref 0.05–0.5)
EOSINOPHILS RELATIVE PERCENT: 0 % (ref 0–6)
EPL-TEG: 0.5 % (ref 0–15)
ERYTHROCYTE [DISTWIDTH] IN BLOOD BY AUTOMATED COUNT: 16.2 % (ref 11.5–15)
FIO2: 100 %
FIO2: 60 %
G-TEG: 15.6 KDYN/CM2 (ref 4.5–11)
GFR, ESTIMATED: >90 ML/MIN/1.73M2
GLUCOSE BLD-MCNC: 148 MG/DL (ref 74–99)
GLUCOSE BLD-MCNC: 148 MG/DL (ref 74–99)
GLUCOSE BLD-MCNC: 152 MG/DL (ref 74–99)
GLUCOSE BLD-MCNC: 179 MG/DL (ref 74–99)
GLUCOSE SERPL-MCNC: 150 MG/DL (ref 74–99)
GLUCOSE SERPL-MCNC: 160 MG/DL (ref 74–99)
GLUCOSE SERPL-MCNC: 176 MG/DL (ref 74–99)
HBA1C MFR BLD: 5.8 % (ref 4–5.6)
HCO3: 20.3 MMOL/L (ref 22–26)
HCO3: 20.4 MMOL/L (ref 22–26)
HCT VFR BLD AUTO: 40.7 % (ref 34–48)
HDLC SERPL-MCNC: 44 MG/DL
HGB BLD-MCNC: 13.4 G/DL (ref 11.5–15.5)
HHB: 0.7 % (ref 0–5)
HHB: 0.9 % (ref 0–5)
IMM GRANULOCYTES # BLD AUTO: 0.07 K/UL (ref 0–0.58)
IMM GRANULOCYTES NFR BLD: 1 % (ref 0–5)
KINETICS TEG: 0.8 MIN (ref 1–3)
LAB: ABNORMAL
LAB: ABNORMAL
LDLC SERPL CALC-MCNC: 74 MG/DL
LY30 (LYSIS) TEG: 0.5 % (ref 0–8)
LYMPHOCYTES NFR BLD: 1.3 K/UL (ref 1.5–4)
LYMPHOCYTES RELATIVE PERCENT: 9 % (ref 20–42)
Lab: 137
Lab: 456
MA (MAX CLOT) TEG: 75.7 MM (ref 50–70)
MCH RBC QN AUTO: 28.8 PG (ref 26–35)
MCHC RBC AUTO-ENTMCNC: 32.9 G/DL (ref 32–34.5)
MCV RBC AUTO: 87.3 FL (ref 80–99.9)
METHB: 0.3 % (ref 0–1.5)
METHB: 0.4 % (ref 0–1.5)
MODE: AC
MODE: AC
MONOCYTES NFR BLD: 1.16 K/UL (ref 0.1–0.95)
MONOCYTES NFR BLD: 8 % (ref 2–12)
NEUTROPHILS NFR BLD: 81 % (ref 43–80)
NEUTS SEG NFR BLD: 11.35 K/UL (ref 1.8–7.3)
O2 CONTENT: 19.4 ML/DL
O2 CONTENT: 19.9 ML/DL
O2 SATURATION: 99.1 % (ref 92–98.5)
O2 SATURATION: 99.3 % (ref 92–98.5)
O2HB: 98.5 % (ref 94–97)
O2HB: 98.7 % (ref 94–97)
OPERATOR ID: ABNORMAL
OPERATOR ID: ABNORMAL
PATIENT TEMP: 37 C
PATIENT TEMP: 37 C
PCO2: 30.1 MMHG (ref 35–45)
PCO2: 32.1 MMHG (ref 35–45)
PEEP/CPAP: 5 CMH2O
PEEP/CPAP: 8 CMH2O
PFO2: 3.3 MMHG/%
PFO2: 3.38 MMHG/%
PH BLOOD GAS: 7.42 (ref 7.35–7.45)
PH BLOOD GAS: 7.45 (ref 7.35–7.45)
PLATELET # BLD AUTO: 302 K/UL (ref 130–450)
PMV BLD AUTO: 10.4 FL (ref 7–12)
PO2: 198.2 MMHG (ref 75–100)
PO2: 338.5 MMHG (ref 75–100)
POTASSIUM SERPL-SCNC: 3.2 MMOL/L (ref 3.5–5)
POTASSIUM SERPL-SCNC: 3.3 MMOL/L (ref 3.5–5)
POTASSIUM SERPL-SCNC: 3.6 MMOL/L (ref 3.5–5)
RBC # BLD AUTO: 4.66 M/UL (ref 3.5–5.5)
REACTION TIME TEG: 3.4 MIN (ref 5–10)
RI(T): 0.98
RI(T): 1.02
RR MECHANICAL: 14 B/MIN
RR MECHANICAL: 18 B/MIN
SODIUM SERPL-SCNC: 140 MMOL/L (ref 132–146)
SODIUM SERPL-SCNC: 142 MMOL/L (ref 132–146)
SODIUM SERPL-SCNC: 144 MMOL/L (ref 132–146)
SODIUM SERPL-SCNC: 144 MMOL/L (ref 132–146)
SODIUM SERPL-SCNC: 145 MMOL/L (ref 132–146)
SODIUM SERPL-SCNC: 145 MMOL/L (ref 132–146)
SOURCE, BLOOD GAS: ABNORMAL
SOURCE, BLOOD GAS: ABNORMAL
THB: 13.7 G/DL (ref 11.5–16.5)
THB: 13.7 G/DL (ref 11.5–16.5)
TIME ANALYZED: 138
TIME ANALYZED: 503
TRANSFUSION STATUS: NORMAL
TRIGL SERPL-MCNC: 224 MG/DL
UNIT DIVISION: 0
UNIT ISSUE DATE/TIME: NORMAL
VLDLC SERPL CALC-MCNC: 45 MG/DL
VT MECHANICAL: 300 ML
VT MECHANICAL: 450 ML
WBC OTHER # BLD: 14 K/UL (ref 4.5–11.5)

## 2024-05-06 PROCEDURE — 2060000000 HC ICU INTERMEDIATE R&B

## 2024-05-06 PROCEDURE — 2500000003 HC RX 250 WO HCPCS

## 2024-05-06 PROCEDURE — 2580000003 HC RX 258

## 2024-05-06 PROCEDURE — 94003 VENT MGMT INPAT SUBQ DAY: CPT

## 2024-05-06 PROCEDURE — 04HY32Z INSERTION OF MONITORING DEVICE INTO LOWER ARTERY, PERCUTANEOUS APPROACH: ICD-10-PCS | Performed by: INTERNAL MEDICINE

## 2024-05-06 PROCEDURE — 6360000002 HC RX W HCPCS

## 2024-05-06 PROCEDURE — 99223 1ST HOSP IP/OBS HIGH 75: CPT | Performed by: PSYCHIATRY & NEUROLOGY

## 2024-05-06 PROCEDURE — 80061 LIPID PANEL: CPT

## 2024-05-06 PROCEDURE — 2580000003 HC RX 258: Performed by: NURSE PRACTITIONER

## 2024-05-06 PROCEDURE — 2500000003 HC RX 250 WO HCPCS: Performed by: NURSE PRACTITIONER

## 2024-05-06 PROCEDURE — 71045 X-RAY EXAM CHEST 1 VIEW: CPT

## 2024-05-06 PROCEDURE — 82805 BLOOD GASES W/O2 SATURATION: CPT

## 2024-05-06 PROCEDURE — 36620 INSERTION CATHETER ARTERY: CPT

## 2024-05-06 PROCEDURE — 85025 COMPLETE CBC W/AUTO DIFF WBC: CPT

## 2024-05-06 PROCEDURE — 70450 CT HEAD/BRAIN W/O DYE: CPT

## 2024-05-06 PROCEDURE — 37799 UNLISTED PX VASCULAR SURGERY: CPT

## 2024-05-06 PROCEDURE — 95822 EEG COMA OR SLEEP ONLY: CPT

## 2024-05-06 PROCEDURE — 94002 VENT MGMT INPAT INIT DAY: CPT

## 2024-05-06 PROCEDURE — 6360000002 HC RX W HCPCS: Performed by: NURSE PRACTITIONER

## 2024-05-06 PROCEDURE — 6370000000 HC RX 637 (ALT 250 FOR IP)

## 2024-05-06 PROCEDURE — 6360000002 HC RX W HCPCS: Performed by: PSYCHIATRY & NEUROLOGY

## 2024-05-06 PROCEDURE — 6370000000 HC RX 637 (ALT 250 FOR IP): Performed by: NURSE PRACTITIONER

## 2024-05-06 PROCEDURE — 2580000003 HC RX 258: Performed by: INTERNAL MEDICINE

## 2024-05-06 PROCEDURE — 74018 RADEX ABDOMEN 1 VIEW: CPT

## 2024-05-06 PROCEDURE — 02HV33Z INSERTION OF INFUSION DEVICE INTO SUPERIOR VENA CAVA, PERCUTANEOUS APPROACH: ICD-10-PCS | Performed by: INTERNAL MEDICINE

## 2024-05-06 PROCEDURE — APPSS30 APP SPLIT SHARED TIME 16-30 MINUTES: Performed by: NURSE PRACTITIONER

## 2024-05-06 PROCEDURE — 99291 CRITICAL CARE FIRST HOUR: CPT | Performed by: SURGERY

## 2024-05-06 PROCEDURE — 82962 GLUCOSE BLOOD TEST: CPT

## 2024-05-06 PROCEDURE — 80048 BASIC METABOLIC PNL TOTAL CA: CPT

## 2024-05-06 PROCEDURE — 5A1945Z RESPIRATORY VENTILATION, 24-96 CONSECUTIVE HOURS: ICD-10-PCS | Performed by: INTERNAL MEDICINE

## 2024-05-06 PROCEDURE — 2580000003 HC RX 258: Performed by: PSYCHIATRY & NEUROLOGY

## 2024-05-06 PROCEDURE — 83036 HEMOGLOBIN GLYCOSYLATED A1C: CPT

## 2024-05-06 PROCEDURE — A4216 STERILE WATER/SALINE, 10 ML: HCPCS

## 2024-05-06 PROCEDURE — 84295 ASSAY OF SERUM SODIUM: CPT

## 2024-05-06 RX ORDER — LABETALOL HYDROCHLORIDE 5 MG/ML
10 INJECTION, SOLUTION INTRAVENOUS EVERY 10 MIN PRN
Status: DISCONTINUED | OUTPATIENT
Start: 2024-05-06 | End: 2024-05-08

## 2024-05-06 RX ORDER — LEVOTHYROXINE SODIUM 0.12 MG/1
125 TABLET ORAL DAILY
Status: DISCONTINUED | OUTPATIENT
Start: 2024-05-06 | End: 2024-05-08

## 2024-05-06 RX ORDER — POLYETHYLENE GLYCOL 3350 17 G/17G
17 POWDER, FOR SOLUTION ORAL DAILY PRN
Status: DISCONTINUED | OUTPATIENT
Start: 2024-05-06 | End: 2024-05-08

## 2024-05-06 RX ORDER — DEXTROSE MONOHYDRATE 100 MG/ML
INJECTION, SOLUTION INTRAVENOUS CONTINUOUS PRN
Status: DISCONTINUED | OUTPATIENT
Start: 2024-05-06 | End: 2024-05-08

## 2024-05-06 RX ORDER — DULOXETIN HYDROCHLORIDE 30 MG/1
60 CAPSULE, DELAYED RELEASE ORAL DAILY
Status: DISCONTINUED | OUTPATIENT
Start: 2024-05-07 | End: 2024-05-08

## 2024-05-06 RX ORDER — LEVETIRACETAM 500 MG/5ML
500 INJECTION, SOLUTION, CONCENTRATE INTRAVENOUS EVERY 12 HOURS
Status: DISCONTINUED | OUTPATIENT
Start: 2024-05-06 | End: 2024-05-07

## 2024-05-06 RX ORDER — FENTANYL CITRATE 50 UG/ML
25 INJECTION, SOLUTION INTRAMUSCULAR; INTRAVENOUS
Status: DISCONTINUED | OUTPATIENT
Start: 2024-05-06 | End: 2024-05-08 | Stop reason: HOSPADM

## 2024-05-06 RX ORDER — CHLORHEXIDINE GLUCONATE ORAL RINSE 1.2 MG/ML
15 SOLUTION DENTAL EVERY 6 HOURS
Status: DISCONTINUED | OUTPATIENT
Start: 2024-05-06 | End: 2024-05-08

## 2024-05-06 RX ORDER — ACETAMINOPHEN 500 MG
500 TABLET ORAL EVERY 4 HOURS PRN
Status: DISCONTINUED | OUTPATIENT
Start: 2024-05-06 | End: 2024-05-08 | Stop reason: HOSPADM

## 2024-05-06 RX ORDER — FAMOTIDINE 40 MG/5ML
10 POWDER, FOR SUSPENSION ORAL 2 TIMES DAILY
Status: DISCONTINUED | OUTPATIENT
Start: 2024-05-06 | End: 2024-05-08 | Stop reason: HOSPADM

## 2024-05-06 RX ORDER — MINERAL OIL AND WHITE PETROLATUM 150; 830 MG/G; MG/G
OINTMENT OPHTHALMIC EVERY 4 HOURS
Status: DISCONTINUED | OUTPATIENT
Start: 2024-05-06 | End: 2024-05-08

## 2024-05-06 RX ORDER — PROPOFOL 10 MG/ML
5-50 INJECTION, EMULSION INTRAVENOUS CONTINUOUS
Status: DISCONTINUED | OUTPATIENT
Start: 2024-05-06 | End: 2024-05-08 | Stop reason: HOSPADM

## 2024-05-06 RX ORDER — ACETAMINOPHEN 325 MG/1
650 TABLET ORAL EVERY 4 HOURS PRN
Status: DISCONTINUED | OUTPATIENT
Start: 2024-05-06 | End: 2024-05-06

## 2024-05-06 RX ORDER — TOPIRAMATE 25 MG/1
25 TABLET ORAL 2 TIMES DAILY
Status: DISCONTINUED | OUTPATIENT
Start: 2024-05-06 | End: 2024-05-07

## 2024-05-06 RX ORDER — LOSARTAN POTASSIUM 50 MG/1
100 TABLET ORAL DAILY
Status: DISCONTINUED | OUTPATIENT
Start: 2024-05-06 | End: 2024-05-08

## 2024-05-06 RX ORDER — ATORVASTATIN CALCIUM 40 MG/1
80 TABLET, FILM COATED ORAL NIGHTLY
Status: DISCONTINUED | OUTPATIENT
Start: 2024-05-06 | End: 2024-05-08

## 2024-05-06 RX ORDER — OXYMETAZOLINE HYDROCHLORIDE 0.05 G/100ML
2 SPRAY NASAL ONCE
Status: DISCONTINUED | OUTPATIENT
Start: 2024-05-06 | End: 2024-05-06

## 2024-05-06 RX ORDER — ACETAMINOPHEN 500 MG
500 TABLET ORAL EVERY 6 HOURS SCHEDULED
Status: DISCONTINUED | OUTPATIENT
Start: 2024-05-06 | End: 2024-05-08 | Stop reason: HOSPADM

## 2024-05-06 RX ORDER — POTASSIUM CHLORIDE 29.8 MG/ML
20 INJECTION INTRAVENOUS ONCE
Status: COMPLETED | OUTPATIENT
Start: 2024-05-06 | End: 2024-05-06

## 2024-05-06 RX ORDER — DULOXETIN HYDROCHLORIDE 30 MG/1
60 CAPSULE, DELAYED RELEASE ORAL DAILY
Status: DISCONTINUED | OUTPATIENT
Start: 2024-05-06 | End: 2024-05-06

## 2024-05-06 RX ORDER — HYDRALAZINE HYDROCHLORIDE 20 MG/ML
10 INJECTION INTRAMUSCULAR; INTRAVENOUS EVERY 10 MIN PRN
Status: DISCONTINUED | OUTPATIENT
Start: 2024-05-06 | End: 2024-05-08

## 2024-05-06 RX ORDER — GLUCAGON 1 MG/ML
1 KIT INJECTION PRN
Status: DISCONTINUED | OUTPATIENT
Start: 2024-05-06 | End: 2024-05-08

## 2024-05-06 RX ORDER — INSULIN LISPRO 100 [IU]/ML
0-4 INJECTION, SOLUTION INTRAVENOUS; SUBCUTANEOUS EVERY 4 HOURS
Status: DISCONTINUED | OUTPATIENT
Start: 2024-05-06 | End: 2024-05-07

## 2024-05-06 RX ORDER — LANOLIN ALCOHOL/MO/W.PET/CERES
100 CREAM (GRAM) TOPICAL DAILY
Status: DISCONTINUED | OUTPATIENT
Start: 2024-05-06 | End: 2024-05-08

## 2024-05-06 RX ORDER — AMLODIPINE BESYLATE 10 MG/1
10 TABLET ORAL DAILY
Status: DISCONTINUED | OUTPATIENT
Start: 2024-05-06 | End: 2024-05-08

## 2024-05-06 RX ORDER — CHLORHEXIDINE GLUCONATE ORAL RINSE 1.2 MG/ML
15 SOLUTION DENTAL 2 TIMES DAILY
Status: DISCONTINUED | OUTPATIENT
Start: 2024-05-06 | End: 2024-05-06

## 2024-05-06 RX ORDER — FOLIC ACID 1 MG/1
1 TABLET ORAL 2 TIMES DAILY
Status: DISCONTINUED | OUTPATIENT
Start: 2024-05-06 | End: 2024-05-08

## 2024-05-06 RX ORDER — PROPOFOL 10 MG/ML
INJECTION, EMULSION INTRAVENOUS
Status: COMPLETED
Start: 2024-05-06 | End: 2024-05-06

## 2024-05-06 RX ADMIN — SODIUM CHLORIDE, PRESERVATIVE FREE 10 ML: 5 INJECTION INTRAVENOUS at 08:37

## 2024-05-06 RX ADMIN — FAMOTIDINE 20 MG: 10 INJECTION, SOLUTION INTRAVENOUS at 04:02

## 2024-05-06 RX ADMIN — LOSARTAN POTASSIUM 100 MG: 50 TABLET, FILM COATED ORAL at 08:33

## 2024-05-06 RX ADMIN — PROPOFOL 35 MCG/KG/MIN: 10 INJECTION, EMULSION INTRAVENOUS at 20:18

## 2024-05-06 RX ADMIN — LABETALOL HYDROCHLORIDE 10 MG: 5 INJECTION INTRAVENOUS at 05:12

## 2024-05-06 RX ADMIN — LABETALOL HYDROCHLORIDE 10 MG: 5 INJECTION INTRAVENOUS at 03:13

## 2024-05-06 RX ADMIN — LABETALOL HYDROCHLORIDE 10 MG: 5 INJECTION INTRAVENOUS at 02:01

## 2024-05-06 RX ADMIN — SODIUM CHLORIDE 5 MG/HR: 9 INJECTION, SOLUTION INTRAVENOUS at 11:58

## 2024-05-06 RX ADMIN — SODIUM CHLORIDE 25 ML/HR: 3 INJECTION, SOLUTION INTRAVENOUS at 02:51

## 2024-05-06 RX ADMIN — PROPOFOL 20 MCG/KG/MIN: 10 INJECTION, EMULSION INTRAVENOUS at 00:38

## 2024-05-06 RX ADMIN — FENTANYL CITRATE 25 MCG: 50 INJECTION INTRAMUSCULAR; INTRAVENOUS at 14:48

## 2024-05-06 RX ADMIN — PROPOFOL 10 MCG/KG/MIN: 10 INJECTION, EMULSION INTRAVENOUS at 15:51

## 2024-05-06 RX ADMIN — FAMOTIDINE 20 MG: 10 INJECTION, SOLUTION INTRAVENOUS at 08:32

## 2024-05-06 RX ADMIN — LABETALOL HYDROCHLORIDE 10 MG: 5 INJECTION INTRAVENOUS at 13:59

## 2024-05-06 RX ADMIN — LABETALOL HYDROCHLORIDE 10 MG: 5 INJECTION INTRAVENOUS at 11:20

## 2024-05-06 RX ADMIN — LABETALOL HYDROCHLORIDE 10 MG: 5 INJECTION INTRAVENOUS at 12:43

## 2024-05-06 RX ADMIN — SODIUM CHLORIDE 25 ML/HR: 3 INJECTION, SOLUTION INTRAVENOUS at 22:35

## 2024-05-06 RX ADMIN — FENTANYL CITRATE 25 MCG: 50 INJECTION INTRAMUSCULAR; INTRAVENOUS at 17:22

## 2024-05-06 RX ADMIN — 0.12% CHLORHEXIDINE GLUCONATE 15 ML: 1.2 RINSE ORAL at 22:27

## 2024-05-06 RX ADMIN — LABETALOL HYDROCHLORIDE 10 MG: 5 INJECTION INTRAVENOUS at 11:05

## 2024-05-06 RX ADMIN — SODIUM CHLORIDE, PRESERVATIVE FREE 10 ML: 5 INJECTION INTRAVENOUS at 20:09

## 2024-05-06 RX ADMIN — FOLIC ACID 1 MG: 1 TABLET ORAL at 20:09

## 2024-05-06 RX ADMIN — HYDRALAZINE HYDROCHLORIDE 10 MG: 20 INJECTION INTRAMUSCULAR; INTRAVENOUS at 06:28

## 2024-05-06 RX ADMIN — METOPROLOL TARTRATE 25 MG: 25 TABLET, FILM COATED ORAL at 20:09

## 2024-05-06 RX ADMIN — HYDRALAZINE HYDROCHLORIDE 10 MG: 20 INJECTION INTRAMUSCULAR; INTRAVENOUS at 03:36

## 2024-05-06 RX ADMIN — ACETAMINOPHEN 650 MG: 325 TABLET ORAL at 12:26

## 2024-05-06 RX ADMIN — LABETALOL HYDROCHLORIDE 10 MG: 5 INJECTION INTRAVENOUS at 09:58

## 2024-05-06 RX ADMIN — LABETALOL HYDROCHLORIDE 10 MG: 5 INJECTION INTRAVENOUS at 10:24

## 2024-05-06 RX ADMIN — POLYVINYL ALCOHOL, POVIDONE 1 DROP: 14; 6 SOLUTION/ DROPS OPHTHALMIC at 06:54

## 2024-05-06 RX ADMIN — ATORVASTATIN CALCIUM 80 MG: 40 TABLET, FILM COATED ORAL at 20:09

## 2024-05-06 RX ADMIN — ACETAMINOPHEN 500 MG: 325 TABLET ORAL at 22:28

## 2024-05-06 RX ADMIN — ACETAMINOPHEN 500 MG: 325 TABLET ORAL at 17:25

## 2024-05-06 RX ADMIN — LABETALOL HYDROCHLORIDE 10 MG: 5 INJECTION INTRAVENOUS at 01:12

## 2024-05-06 RX ADMIN — TOPIRAMATE 25 MG: 25 TABLET, FILM COATED ORAL at 08:36

## 2024-05-06 RX ADMIN — FOLIC ACID 1 MG: 1 TABLET ORAL at 08:32

## 2024-05-06 RX ADMIN — FENTANYL CITRATE 25 MCG: 50 INJECTION INTRAMUSCULAR; INTRAVENOUS at 20:00

## 2024-05-06 RX ADMIN — LEVETIRACETAM 500 MG: 100 INJECTION INTRAVENOUS at 09:53

## 2024-05-06 RX ADMIN — PROPOFOL 30 MCG/KG/MIN: 10 INJECTION, EMULSION INTRAVENOUS at 06:26

## 2024-05-06 RX ADMIN — METOPROLOL TARTRATE 25 MG: 25 TABLET, FILM COATED ORAL at 08:43

## 2024-05-06 RX ADMIN — DEXMEDETOMIDINE 0.2 MCG/KG/HR: 100 INJECTION, SOLUTION INTRAVENOUS at 14:59

## 2024-05-06 RX ADMIN — LABETALOL HYDROCHLORIDE 10 MG: 5 INJECTION INTRAVENOUS at 21:01

## 2024-05-06 RX ADMIN — AMLODIPINE BESYLATE 10 MG: 10 TABLET ORAL at 11:36

## 2024-05-06 RX ADMIN — SODIUM CHLORIDE 5 MG/HR: 9 INJECTION, SOLUTION INTRAVENOUS at 15:56

## 2024-05-06 RX ADMIN — FAMOTIDINE 10 MG: 40 POWDER, FOR SUSPENSION ORAL at 20:22

## 2024-05-06 RX ADMIN — POLYVINYL ALCOHOL, POVIDONE 1 DROP: 14; 6 SOLUTION/ DROPS OPHTHALMIC at 15:00

## 2024-05-06 RX ADMIN — SODIUM CHLORIDE: 9 INJECTION, SOLUTION INTRAVENOUS at 02:57

## 2024-05-06 RX ADMIN — POLYVINYL ALCOHOL, POVIDONE 1 DROP: 14; 6 SOLUTION/ DROPS OPHTHALMIC at 03:13

## 2024-05-06 RX ADMIN — LABETALOL HYDROCHLORIDE 10 MG: 5 INJECTION INTRAVENOUS at 06:00

## 2024-05-06 RX ADMIN — LABETALOL HYDROCHLORIDE 10 MG: 5 INJECTION INTRAVENOUS at 11:32

## 2024-05-06 RX ADMIN — LABETALOL HYDROCHLORIDE 10 MG: 5 INJECTION INTRAVENOUS at 04:47

## 2024-05-06 RX ADMIN — PROPOFOL 45 MCG/KG/MIN: 10 INJECTION, EMULSION INTRAVENOUS at 23:59

## 2024-05-06 RX ADMIN — HYDRALAZINE HYDROCHLORIDE 10 MG: 20 INJECTION INTRAMUSCULAR; INTRAVENOUS at 02:35

## 2024-05-06 RX ADMIN — LABETALOL HYDROCHLORIDE 10 MG: 5 INJECTION INTRAVENOUS at 09:48

## 2024-05-06 RX ADMIN — LABETALOL HYDROCHLORIDE 10 MG: 5 INJECTION INTRAVENOUS at 09:35

## 2024-05-06 RX ADMIN — Medication 100 MG: at 08:32

## 2024-05-06 RX ADMIN — POLYVINYL ALCOHOL, POVIDONE 1 DROP: 14; 6 SOLUTION/ DROPS OPHTHALMIC at 11:09

## 2024-05-06 RX ADMIN — POLYVINYL ALCOHOL, POVIDONE 1 DROP: 14; 6 SOLUTION/ DROPS OPHTHALMIC at 19:09

## 2024-05-06 RX ADMIN — LABETALOL HYDROCHLORIDE 10 MG: 5 INJECTION INTRAVENOUS at 10:54

## 2024-05-06 RX ADMIN — LIDOCAINE HYDROCHLORIDE: 10 INJECTION, SOLUTION INFILTRATION; PERINEURAL at 00:00

## 2024-05-06 RX ADMIN — LEVETIRACETAM 500 MG: 100 INJECTION INTRAVENOUS at 22:27

## 2024-05-06 RX ADMIN — 0.12% CHLORHEXIDINE GLUCONATE 15 ML: 1.2 RINSE ORAL at 08:32

## 2024-05-06 RX ADMIN — 0.12% CHLORHEXIDINE GLUCONATE 15 ML: 1.2 RINSE ORAL at 03:13

## 2024-05-06 RX ADMIN — 0.12% CHLORHEXIDINE GLUCONATE 15 ML: 1.2 RINSE ORAL at 16:13

## 2024-05-06 RX ADMIN — LABETALOL HYDROCHLORIDE 10 MG: 5 INJECTION INTRAVENOUS at 09:22

## 2024-05-06 RX ADMIN — DULOXETINE HYDROCHLORIDE 60 MG: 30 CAPSULE, DELAYED RELEASE ORAL at 08:33

## 2024-05-06 RX ADMIN — POTASSIUM CHLORIDE 20 MEQ: 29.8 INJECTION, SOLUTION INTRAVENOUS at 02:58

## 2024-05-06 RX ADMIN — POLYVINYL ALCOHOL, POVIDONE 1 DROP: 14; 6 SOLUTION/ DROPS OPHTHALMIC at 23:58

## 2024-05-06 RX ADMIN — LABETALOL HYDROCHLORIDE 10 MG: 5 INJECTION INTRAVENOUS at 01:47

## 2024-05-06 ASSESSMENT — PULMONARY FUNCTION TESTS
PIF_VALUE: 16
PIF_VALUE: 19
PIF_VALUE: 18
PIF_VALUE: 11
PIF_VALUE: 18
PIF_VALUE: 15
PIF_VALUE: 19
PIF_VALUE: 14
PIF_VALUE: 17
PIF_VALUE: 19
PIF_VALUE: 18
PIF_VALUE: 15
PIF_VALUE: 18
PIF_VALUE: 18
PIF_VALUE: 19
PIF_VALUE: 15
PIF_VALUE: 19
PIF_VALUE: 18
PIF_VALUE: 18
PIF_VALUE: 16
PIF_VALUE: 18
PIF_VALUE: 19
PIF_VALUE: 16
PIF_VALUE: 19
PIF_VALUE: 18
PIF_VALUE: 19
PIF_VALUE: 18
PIF_VALUE: 11
PIF_VALUE: 20
PIF_VALUE: 19
PIF_VALUE: 18
PIF_VALUE: 18
PIF_VALUE: 19
PIF_VALUE: 19
PIF_VALUE: 18
PIF_VALUE: 18
PIF_VALUE: 15
PIF_VALUE: 13
PIF_VALUE: 14
PIF_VALUE: 18
PIF_VALUE: 19
PIF_VALUE: 18
PIF_VALUE: 16
PIF_VALUE: 16
PIF_VALUE: 18
PIF_VALUE: 19
PIF_VALUE: 19
PIF_VALUE: 14
PIF_VALUE: 18
PIF_VALUE: 13
PIF_VALUE: 18
PIF_VALUE: 20
PIF_VALUE: 14

## 2024-05-06 NOTE — ACP (ADVANCE CARE PLANNING)
Advance Care Planning   Healthcare Decision Maker:    Primary Decision Maker: Melvin Wiseman - [phone]    Secondary Decision Maker: Quinn Wiseman - [phone]    Supplemental (Other) Decision Maker: Nicci Wiseman - [phone]    Click here to complete Healthcare Decision Makers including selection of the Healthcare Decision Maker Relationship (ie \"Primary\").

## 2024-05-06 NOTE — PROCEDURES
PROCEDURE NOTE  Date: 5/6/2024   Name: Allyson Wiseman  YOB: 1960    Central Line    Date/Time: 5/6/2024 2:20 AM    Performed by: Naga Patel DO  Authorized by: Naga Patel DO  Consent: Written consent obtained.  Consent given by: guardian  Required items: required blood products, implants, devices, and special equipment available  Patient identity confirmed: hospital-assigned identification number and arm band  Time out: Immediately prior to procedure a \"time out\" was called to verify the correct patient, procedure, equipment, support staff and site/side marked as required.  Indications: vascular access    Anesthesia:  Local Anesthetic: lidocaine 1% with epinephrine  Anesthetic total: 5 mL    Sedation:  Patient sedated: yes  Sedation type: anxiolysis  Vitals: Vital signs were monitored during sedation.    Preparation: skin prepped with ChloraPrep  Skin prep agent dried: skin prep agent completely dried prior to procedure  Sterile barriers: all five maximum sterile barriers used - cap, mask, sterile gown, sterile gloves, and large sterile sheet  Hand hygiene: hand hygiene performed prior to central venous catheter insertion  Location details: right internal jugular  Patient position: flat  Catheter type: triple lumen  Catheter size: 7 Fr  Ultrasound guidance: yes  Number of attempts: 1  Post-procedure: line sutured  Assessment: blood return through all ports and placement verified by x-ray  Patient tolerance: patient tolerated the procedure well with no immediate complications

## 2024-05-06 NOTE — ED NOTES
Patient taken to MRI, extremely combative, attempted to hold patient for the MRI To be completed. Assistance of 5 people needed to transport patient back to room. Drs notified of patients condition. Patient soon became unresponsive, patients blood pressure extremely labile. Orders followed . Patient intubated .

## 2024-05-06 NOTE — PROCEDURES
PROCEDURE NOTE  Date: 5/6/2024   Name: Allyson Wiseman  YOB: 1960    Insert Arterial Line    Date/Time: 5/6/2024 2:18 AM    Performed by: Naga Patel DO  Authorized by: Naga Patel DO  Consent: Written consent obtained.  Risks and benefits: risks, benefits and alternatives were discussed  Consent given by: guardian  Required items: required blood products, implants, devices, and special equipment available  Patient identity confirmed: arm band and hospital-assigned identification number  Time out: Immediately prior to procedure a \"time out\" was called to verify the correct patient, procedure, equipment, support staff and site/side marked as required.  Preparation: Patient was prepped and draped in the usual sterile fashion.  Indications: hemodynamic monitoring  Location: right radial  Anesthesia: local infiltration    Anesthesia:  Local Anesthetic: lidocaine 1% with epinephrine  Anesthetic total: 5 mL    Sedation:  Patient sedated: yes  Sedation type: anxiolysis    Jesús's test normal: yes  Needle gauge: 20  Seldinger technique: Seldinger technique used  Number of attempts: 2  Post-procedure: line sutured and dressing applied  Post-procedure CMS: unchanged  Patient tolerance: patient tolerated the procedure well with no immediate complications

## 2024-05-06 NOTE — PROCEDURES
EEG portable with video:    Method:   This recording was done using 16 channel of EEG recording and 1 channel of EKG recording. It is performed at the patient's bedside on a ventilator. It captures periods of sedation. No activation procedures are performed during this study.     Interpretation:   There is a posterior dominant rhythm of 3-4 Hz delta activity with prominent bifrontal slowing.  Beta activity could be seen superimposed on the delta activity bilaterally. There were no epileptiform abnormalities or electrographic seizures in this recording.     EKG demonstrates a regular rhythm with rate of 108 bpm.     Summary: This is an abnormal Electroencephalogram with severe generalized background slowing which is non specific and can be secondary to subarachnoid bleed, metabolic or toxic encephalopathy, delirium, or medication effect. There is prominent bifrontal slowing suggestive of cortical and subcortical dysfunction involving the bifrontal lobe.     There are no epileptiform abnormality or electrographic seizures in this recording .        Fanny Dillon MD

## 2024-05-06 NOTE — FLOWSHEET NOTE
Pt admitted to NSICU at this time with the following belongings:       05/05/24 2030   Belongings   Dental Appliances None   Vision - Corrective Lenses None   Hearing Aid None   Clothing At home   Jewelry None   Body Piercings Removed N/A   Electronic Devices None   Weapons (Notify Protective Services/Security) None   Other Valuables Purse;At bedside   Home Medications None   Valuables Given To Patient;Other (Comment)  (at bedside)   Provide Name(s) of Who Valuable(s) Were Given To n/a  (all belongings bedside)

## 2024-05-06 NOTE — MANAGEMENT PLAN
Brief Stroke Team Update note    Medications:  PRN Labetalol, Hydralazine and Cardene if needed for SBP>140  Tests: Ct scans and MRI reviewed.   Dispo Continue ICU    Abeba Judge RN,BSN, Stroke Navigator    This note is meant for timely update and informational purposes to communicate with other teams, nursing and case management about plan during stroke table rounds.  This is not a official clinical documentation note.  Please see attending neurology physician note for further final plan of care and updates.

## 2024-05-06 NOTE — H&P
[facility]              [address] [place] [address]                           HISTORY & PHYSICAL      PATIENT NAME: JACEY VILLASEÑOR              : 1960  MED REC NO: [id number]                        ROOM: 4523  ACCOUNT NO: [id number]                       ADMIT DATE: 2024  PROVIDER: Dario Can DO      REFERRING PHYSICIAN:  JEREMY GERARDO    PRIMARY CARE PHYSICIAN:  Evelio Mace DO    CHIEF COMPLAINT:  Intracranial hemorrhage.    HISTORY OF PRESENT ILLNESS:  The patient is a 63-year-old  female who was transferred from outside hospital to [facility] for treatment of intracranial hemorrhage.  The patient presented to outside hospital with stroke-like symptoms, given thrombolytic therapy.  Repeat CT revealed intracranial hemorrhage.  The patient was transferred to [facility].    PAST MEDICAL HISTORY:  Arthritis, Crohn disease, fatty liver, DVT, PE, hyperlipidemia, hypertension, hypothyroidism, obesity.    MEDICATIONS:  Prior to admission:  Vitamin C, biotin, Zyrtec, vitamin D, Cymbalta, folic acid, L-Lysine, Synthroid, Cozaar, Mag-Ox, methotrexate weekly, metoprolol tartrate, Metrogel, Prilosec, Oxistat cream, potassium chloride, Stelara every 6 weeks, Exelderm topical, thiamine, Topamax, turmeric, zinc.    PAST SURGICAL HISTORY:  Abdominal wound I and D, carpal tunnel release, cholecystectomy, colectomy, endometrial ablation, G-tube placement, hernia repair, ileostomy with reversal, bilateral knee replacement, tonsillectomy, tubal ligation, tunneled venous catheter placement.    REVIEW OF SYSTEMS:  Remarkable for above-stated chief complaint.    ALLERGIES:  BIAXIN, CEFACLOR, CLAVULANIC ACID, DOXYCYCLINE, MACROBID.      SOCIAL HISTORY:  No tobacco, no alcohol.    PHYSICAL EXAMINATION:  GENERAL APPEARANCE:  Reveals a 63-year-old  female, who is seen in the neuro ICU, intubated on a

## 2024-05-07 ENCOUNTER — APPOINTMENT (OUTPATIENT)
Dept: CT IMAGING | Age: 64
DRG: 064 | End: 2024-05-07
Attending: INTERNAL MEDICINE
Payer: COMMERCIAL

## 2024-05-07 ENCOUNTER — APPOINTMENT (OUTPATIENT)
Age: 64
DRG: 064 | End: 2024-05-07
Attending: INTERNAL MEDICINE
Payer: COMMERCIAL

## 2024-05-07 ENCOUNTER — APPOINTMENT (OUTPATIENT)
Dept: GENERAL RADIOLOGY | Age: 64
DRG: 064 | End: 2024-05-07
Attending: INTERNAL MEDICINE
Payer: COMMERCIAL

## 2024-05-07 LAB
AADO2: 180.4 MMHG
AADO2: 576.7 MMHG
ANION GAP SERPL CALCULATED.3IONS-SCNC: 12 MMOL/L (ref 7–16)
ANION GAP SERPL CALCULATED.3IONS-SCNC: 12 MMOL/L (ref 7–16)
B.E.: -3.9 MMOL/L (ref -3–3)
B.E.: -5.3 MMOL/L (ref -3–3)
BASOPHILS # BLD: 0.06 K/UL (ref 0–0.2)
BASOPHILS NFR BLD: 1 % (ref 0–2)
BUN SERPL-MCNC: 13 MG/DL (ref 6–23)
BUN SERPL-MCNC: 16 MG/DL (ref 6–23)
CA-I BLD-SCNC: 1.35 MMOL/L (ref 1.15–1.33)
CALCIUM SERPL-MCNC: 10.1 MG/DL (ref 8.6–10.2)
CALCIUM SERPL-MCNC: 10.6 MG/DL (ref 8.6–10.2)
CHLORIDE SERPL-SCNC: 113 MMOL/L (ref 98–107)
CHLORIDE SERPL-SCNC: 116 MMOL/L (ref 98–107)
CO2 SERPL-SCNC: 19 MMOL/L (ref 22–29)
CO2 SERPL-SCNC: 21 MMOL/L (ref 22–29)
COHB: 0.1 % (ref 0–1.5)
COHB: 0.3 % (ref 0–1.5)
CREAT SERPL-MCNC: 0.6 MG/DL (ref 0.5–1)
CREAT SERPL-MCNC: 0.6 MG/DL (ref 0.5–1)
CRITICAL: ABNORMAL
CRITICAL: ABNORMAL
DATE ANALYZED: ABNORMAL
DATE ANALYZED: ABNORMAL
DATE OF COLLECTION: ABNORMAL
DATE OF COLLECTION: ABNORMAL
ECHO AO ASC DIAM: 2.9 CM
ECHO AO ASCENDING AORTA INDEX: 1.41 CM/M2
ECHO AV AREA PEAK VELOCITY: 2.1 CM2
ECHO AV AREA VTI: 2.3 CM2
ECHO AV AREA/BSA PEAK VELOCITY: 1 CM2/M2
ECHO AV AREA/BSA VTI: 1.1 CM2/M2
ECHO AV MEAN GRADIENT: 8 MMHG
ECHO AV MEAN VELOCITY: 1.3 M/S
ECHO AV PEAK GRADIENT: 16 MMHG
ECHO AV PEAK VELOCITY: 2 M/S
ECHO AV VELOCITY RATIO: 0.65
ECHO AV VTI: 41.4 CM
ECHO BSA: 2.17 M2
ECHO LA DIAMETER INDEX: 2 CM/M2
ECHO LA DIAMETER: 4.1 CM
ECHO LA VOL A-L A2C: 94 ML (ref 22–52)
ECHO LA VOL A-L A4C: 114 ML (ref 22–52)
ECHO LA VOL MOD A2C: 90 ML (ref 22–52)
ECHO LA VOL MOD A4C: 104 ML (ref 22–52)
ECHO LA VOLUME AREA LENGTH: 107 ML
ECHO LA VOLUME INDEX A-L A2C: 46 ML/M2 (ref 16–34)
ECHO LA VOLUME INDEX A-L A4C: 56 ML/M2 (ref 16–34)
ECHO LA VOLUME INDEX AREA LENGTH: 52 ML/M2 (ref 16–34)
ECHO LA VOLUME INDEX MOD A2C: 44 ML/M2 (ref 16–34)
ECHO LA VOLUME INDEX MOD A4C: 51 ML/M2 (ref 16–34)
ECHO LV EDV A2C: 104 ML
ECHO LV EDV A4C: 92 ML
ECHO LV EDV BP: 99 ML (ref 56–104)
ECHO LV EDV INDEX A4C: 45 ML/M2
ECHO LV EDV INDEX BP: 48 ML/M2
ECHO LV EDV NDEX A2C: 51 ML/M2
ECHO LV EJECTION FRACTION A2C: 66 %
ECHO LV EJECTION FRACTION A4C: 65 %
ECHO LV EJECTION FRACTION BIPLANE: 66 % (ref 55–100)
ECHO LV ESV A2C: 35 ML
ECHO LV ESV A4C: 32 ML
ECHO LV ESV BP: 34 ML (ref 19–49)
ECHO LV ESV INDEX A2C: 17 ML/M2
ECHO LV ESV INDEX A4C: 16 ML/M2
ECHO LV ESV INDEX BP: 17 ML/M2
ECHO LV FRACTIONAL SHORTENING: 65 % (ref 28–44)
ECHO LV INTERNAL DIMENSION DIASTOLE INDEX: 2.24 CM/M2
ECHO LV INTERNAL DIMENSION DIASTOLIC: 4.6 CM (ref 3.9–5.3)
ECHO LV INTERNAL DIMENSION SYSTOLIC INDEX: 0.78 CM/M2
ECHO LV INTERNAL DIMENSION SYSTOLIC: 1.6 CM
ECHO LV ISOVOLUMETRIC RELAXATION TIME (IVRT): 87.7 MS
ECHO LV IVSD: 1.7 CM (ref 0.6–0.9)
ECHO LV MASS 2D: 284.8 G (ref 67–162)
ECHO LV MASS INDEX 2D: 138.9 G/M2 (ref 43–95)
ECHO LV POSTERIOR WALL DIASTOLIC: 1.3 CM (ref 0.6–0.9)
ECHO LV RELATIVE WALL THICKNESS RATIO: 0.57
ECHO LVOT AREA: 3.1 CM2
ECHO LVOT AV VTI INDEX: 0.74
ECHO LVOT DIAM: 2 CM
ECHO LVOT MEAN GRADIENT: 4 MMHG
ECHO LVOT PEAK GRADIENT: 7 MMHG
ECHO LVOT PEAK VELOCITY: 1.3 M/S
ECHO LVOT STROKE VOLUME INDEX: 46.9 ML/M2
ECHO LVOT SV: 96.1 ML
ECHO LVOT VTI: 30.6 CM
ECHO MV A VELOCITY: 1 M/S
ECHO MV AREA PHT: 2.3 CM2
ECHO MV AREA VTI: 2.2 CM2
ECHO MV E DECELERATION TIME (DT): 267.4 MS
ECHO MV E VELOCITY: 0.8 M/S
ECHO MV E/A RATIO: 0.8
ECHO MV LVOT VTI INDEX: 1.45
ECHO MV MAX VELOCITY: 1 M/S
ECHO MV MEAN GRADIENT: 2 MMHG
ECHO MV MEAN VELOCITY: 0.6 M/S
ECHO MV PEAK GRADIENT: 4 MMHG
ECHO MV PRESSURE HALF TIME (PHT): 94.6 MS
ECHO MV VTI: 44.4 CM
ECHO PV MAX VELOCITY: 1.2 M/S
ECHO PV MEAN GRADIENT: 4 MMHG
ECHO PV MEAN VELOCITY: 1 M/S
ECHO PV PEAK GRADIENT: 6 MMHG
ECHO PV VTI: 31.3 CM
ECHO PVEIN A DURATION: 129.2 MS
ECHO PVEIN A VELOCITY: 0.3 M/S
ECHO PVEIN PEAK D VELOCITY: 0.5 M/S
ECHO PVEIN PEAK S VELOCITY: 0.8 M/S
ECHO PVEIN S/D RATIO: 1.6
ECHO RV INTERNAL DIMENSION: 3.2 CM
ECHO RV TAPSE: 3 CM (ref 1.7–?)
EKG ATRIAL RATE: 81 BPM
EKG P AXIS: -13 DEGREES
EKG P-R INTERVAL: 190 MS
EKG Q-T INTERVAL: 380 MS
EKG QRS DURATION: 86 MS
EKG QTC CALCULATION (BAZETT): 441 MS
EKG R AXIS: -4 DEGREES
EKG T AXIS: 74 DEGREES
EKG VENTRICULAR RATE: 81 BPM
EOSINOPHIL # BLD: 0.01 K/UL (ref 0.05–0.5)
EOSINOPHILS RELATIVE PERCENT: 0 % (ref 0–6)
ERYTHROCYTE [DISTWIDTH] IN BLOOD BY AUTOMATED COUNT: 16.6 % (ref 11.5–15)
FIO2: 100 %
FIO2: 45 %
GFR, ESTIMATED: >90 ML/MIN/1.73M2
GFR, ESTIMATED: >90 ML/MIN/1.73M2
GLUCOSE BLD-MCNC: 125 MG/DL (ref 74–99)
GLUCOSE BLD-MCNC: 145 MG/DL (ref 74–99)
GLUCOSE SERPL-MCNC: 146 MG/DL (ref 74–99)
HCO3: 19.5 MMOL/L (ref 22–26)
HCO3: 22.9 MMOL/L (ref 22–26)
HCT VFR BLD AUTO: 35.7 % (ref 34–48)
HGB BLD-MCNC: 11.6 G/DL (ref 11.5–15.5)
HHB: 2.2 % (ref 0–5)
HHB: 6.3 % (ref 0–5)
IMM GRANULOCYTES # BLD AUTO: 0.07 K/UL (ref 0–0.58)
IMM GRANULOCYTES NFR BLD: 1 % (ref 0–5)
LAB: ABNORMAL
LAB: ABNORMAL
LYMPHOCYTES NFR BLD: 1.53 K/UL (ref 1.5–4)
LYMPHOCYTES RELATIVE PERCENT: 12 % (ref 20–42)
Lab: 2326
Lab: 548
MAGNESIUM SERPL-MCNC: 2.3 MG/DL (ref 1.6–2.6)
MCH RBC QN AUTO: 29 PG (ref 26–35)
MCHC RBC AUTO-ENTMCNC: 32.5 G/DL (ref 32–34.5)
MCV RBC AUTO: 89.3 FL (ref 80–99.9)
METHB: 0.4 % (ref 0–1.5)
METHB: 0.4 % (ref 0–1.5)
MODE: ABNORMAL
MODE: AC
MONOCYTES NFR BLD: 1.51 K/UL (ref 0.1–0.95)
MONOCYTES NFR BLD: 11 % (ref 2–12)
NEUTROPHILS NFR BLD: 76 % (ref 43–80)
NEUTS SEG NFR BLD: 10.09 K/UL (ref 1.8–7.3)
O2 CONTENT: 17 ML/DL
O2 CONTENT: 18.1 ML/DL
O2 SATURATION: 93.7 % (ref 92–98.5)
O2 SATURATION: 97.8 % (ref 92–98.5)
O2HB: 93 % (ref 94–97)
O2HB: 97.3 % (ref 94–97)
OPERATOR ID: 2577
OPERATOR ID: 7296
PATIENT TEMP: 37 C
PATIENT TEMP: 37 C
PCO2: 30.6 MMHG (ref 35–45)
PCO2: 56.4 MMHG (ref 35–45)
PEEP/CPAP: 8 CMH2O
PEEP/CPAP: 8 CMH2O
PFO2: 0.8 MMHG/%
PFO2: 2.35 MMHG/%
PH BLOOD GAS: 7.23 (ref 7.35–7.45)
PH BLOOD GAS: 7.42 (ref 7.35–7.45)
PHOSPHATE SERPL-MCNC: 1.8 MG/DL (ref 2.5–4.5)
PLATELET # BLD AUTO: 257 K/UL (ref 130–450)
PMV BLD AUTO: 10.5 FL (ref 7–12)
PO2: 105.6 MMHG (ref 75–100)
PO2: 79.9 MMHG (ref 75–100)
POTASSIUM SERPL-SCNC: 3.2 MMOL/L (ref 3.5–5)
POTASSIUM SERPL-SCNC: 3.4 MMOL/L (ref 3.5–5)
PS: 12 CMH20
RBC # BLD AUTO: 4 M/UL (ref 3.5–5.5)
RBC # BLD: ABNORMAL 10*6/UL
RI(T): 1.71
RI(T): 7.22
RR MECHANICAL: 14 B/MIN
SODIUM SERPL-SCNC: 146 MMOL/L (ref 132–146)
SODIUM SERPL-SCNC: 147 MMOL/L (ref 132–146)
SODIUM SERPL-SCNC: 147 MMOL/L (ref 132–146)
SODIUM SERPL-SCNC: 148 MMOL/L (ref 132–146)
SODIUM SERPL-SCNC: 154 MMOL/L (ref 132–146)
SOURCE, BLOOD GAS: ABNORMAL
SOURCE, BLOOD GAS: ABNORMAL
THB: 12.3 G/DL (ref 11.5–16.5)
THB: 13.8 G/DL (ref 11.5–16.5)
TIME ANALYZED: 2328
TIME ANALYZED: 551
VT MECHANICAL: 300 ML
WBC OTHER # BLD: 13.3 K/UL (ref 4.5–11.5)

## 2024-05-07 PROCEDURE — 2580000003 HC RX 258: Performed by: INTERNAL MEDICINE

## 2024-05-07 PROCEDURE — APPSS45 APP SPLIT SHARED TIME 31-45 MINUTES: Performed by: NURSE PRACTITIONER

## 2024-05-07 PROCEDURE — 2580000003 HC RX 258: Performed by: NURSE PRACTITIONER

## 2024-05-07 PROCEDURE — 70450 CT HEAD/BRAIN W/O DYE: CPT

## 2024-05-07 PROCEDURE — 82330 ASSAY OF CALCIUM: CPT

## 2024-05-07 PROCEDURE — 87081 CULTURE SCREEN ONLY: CPT

## 2024-05-07 PROCEDURE — 93306 TTE W/DOPPLER COMPLETE: CPT

## 2024-05-07 PROCEDURE — 99233 SBSQ HOSP IP/OBS HIGH 50: CPT | Performed by: PSYCHIATRY & NEUROLOGY

## 2024-05-07 PROCEDURE — 2060000000 HC ICU INTERMEDIATE R&B

## 2024-05-07 PROCEDURE — 6370000000 HC RX 637 (ALT 250 FOR IP): Performed by: STUDENT IN AN ORGANIZED HEALTH CARE EDUCATION/TRAINING PROGRAM

## 2024-05-07 PROCEDURE — 82962 GLUCOSE BLOOD TEST: CPT

## 2024-05-07 PROCEDURE — 71045 X-RAY EXAM CHEST 1 VIEW: CPT

## 2024-05-07 PROCEDURE — 99291 CRITICAL CARE FIRST HOUR: CPT | Performed by: SURGERY

## 2024-05-07 PROCEDURE — 6370000000 HC RX 637 (ALT 250 FOR IP): Performed by: NURSE PRACTITIONER

## 2024-05-07 PROCEDURE — 6370000000 HC RX 637 (ALT 250 FOR IP)

## 2024-05-07 PROCEDURE — 83735 ASSAY OF MAGNESIUM: CPT

## 2024-05-07 PROCEDURE — 82805 BLOOD GASES W/O2 SATURATION: CPT

## 2024-05-07 PROCEDURE — 6360000002 HC RX W HCPCS

## 2024-05-07 PROCEDURE — 99233 SBSQ HOSP IP/OBS HIGH 50: CPT | Performed by: CLINICAL NURSE SPECIALIST

## 2024-05-07 PROCEDURE — 2500000003 HC RX 250 WO HCPCS: Performed by: STUDENT IN AN ORGANIZED HEALTH CARE EDUCATION/TRAINING PROGRAM

## 2024-05-07 PROCEDURE — 84100 ASSAY OF PHOSPHORUS: CPT

## 2024-05-07 PROCEDURE — 85025 COMPLETE CBC W/AUTO DIFF WBC: CPT

## 2024-05-07 PROCEDURE — 6360000002 HC RX W HCPCS: Performed by: NURSE PRACTITIONER

## 2024-05-07 PROCEDURE — 80048 BASIC METABOLIC PNL TOTAL CA: CPT

## 2024-05-07 PROCEDURE — 93306 TTE W/DOPPLER COMPLETE: CPT | Performed by: INTERNAL MEDICINE

## 2024-05-07 PROCEDURE — 84295 ASSAY OF SERUM SODIUM: CPT

## 2024-05-07 PROCEDURE — 93010 ELECTROCARDIOGRAM REPORT: CPT | Performed by: INTERNAL MEDICINE

## 2024-05-07 PROCEDURE — 99222 1ST HOSP IP/OBS MODERATE 55: CPT

## 2024-05-07 PROCEDURE — 94003 VENT MGMT INPAT SUBQ DAY: CPT

## 2024-05-07 RX ORDER — OXYCODONE HYDROCHLORIDE 5 MG/1
5 TABLET ORAL EVERY 6 HOURS
Status: DISCONTINUED | OUTPATIENT
Start: 2024-05-07 | End: 2024-05-07

## 2024-05-07 RX ORDER — POTASSIUM CHLORIDE 29.8 MG/ML
20 INJECTION INTRAVENOUS ONCE
Status: COMPLETED | OUTPATIENT
Start: 2024-05-07 | End: 2024-05-07

## 2024-05-07 RX ORDER — FENTANYL CITRATE-0.9 % NACL/PF 10 MCG/ML
25-200 PLASTIC BAG, INJECTION (ML) INTRAVENOUS CONTINUOUS
Status: DISCONTINUED | OUTPATIENT
Start: 2024-05-07 | End: 2024-05-08 | Stop reason: HOSPADM

## 2024-05-07 RX ORDER — FENTANYL CITRATE 50 UG/ML
INJECTION, SOLUTION INTRAMUSCULAR; INTRAVENOUS
Status: COMPLETED
Start: 2024-05-07 | End: 2024-05-07

## 2024-05-07 RX ORDER — 3% SODIUM CHLORIDE 3 G/100ML
50 INJECTION, SOLUTION INTRAVENOUS CONTINUOUS
Status: DISCONTINUED | OUTPATIENT
Start: 2024-05-07 | End: 2024-05-08 | Stop reason: HOSPADM

## 2024-05-07 RX ORDER — OXYCODONE HYDROCHLORIDE 5 MG/1
5 TABLET ORAL EVERY 4 HOURS
Status: DISCONTINUED | OUTPATIENT
Start: 2024-05-07 | End: 2024-05-08 | Stop reason: HOSPADM

## 2024-05-07 RX ORDER — FENTANYL CITRATE-0.9 % NACL/PF 20 MCG/2ML
50 SYRINGE (ML) INTRAVENOUS EVERY 30 MIN PRN
Status: DISCONTINUED | OUTPATIENT
Start: 2024-05-07 | End: 2024-05-08 | Stop reason: HOSPADM

## 2024-05-07 RX ORDER — FENTANYL CITRATE 50 UG/ML
100 INJECTION, SOLUTION INTRAMUSCULAR; INTRAVENOUS ONCE
Status: COMPLETED | OUTPATIENT
Start: 2024-05-07 | End: 2024-05-07

## 2024-05-07 RX ORDER — HYDRALAZINE HYDROCHLORIDE 25 MG/1
25 TABLET, FILM COATED ORAL EVERY 8 HOURS SCHEDULED
Status: DISCONTINUED | OUTPATIENT
Start: 2024-05-07 | End: 2024-05-08

## 2024-05-07 RX ORDER — LEVETIRACETAM 100 MG/ML
500 SOLUTION ORAL 2 TIMES DAILY
Status: DISCONTINUED | OUTPATIENT
Start: 2024-05-07 | End: 2024-05-08

## 2024-05-07 RX ADMIN — HYDRALAZINE HYDROCHLORIDE 25 MG: 25 TABLET ORAL at 20:07

## 2024-05-07 RX ADMIN — METOPROLOL TARTRATE 25 MG: 25 TABLET, FILM COATED ORAL at 20:07

## 2024-05-07 RX ADMIN — OXYCODONE HYDROCHLORIDE 5 MG: 5 TABLET ORAL at 22:34

## 2024-05-07 RX ADMIN — 0.12% CHLORHEXIDINE GLUCONATE 15 ML: 1.2 RINSE ORAL at 22:34

## 2024-05-07 RX ADMIN — 0.12% CHLORHEXIDINE GLUCONATE 15 ML: 1.2 RINSE ORAL at 04:38

## 2024-05-07 RX ADMIN — SODIUM CHLORIDE 50 ML/HR: 3 INJECTION, SOLUTION INTRAVENOUS at 13:28

## 2024-05-07 RX ADMIN — FENTANYL CITRATE 25 MCG: 50 INJECTION INTRAMUSCULAR; INTRAVENOUS at 14:59

## 2024-05-07 RX ADMIN — PROPOFOL 50 MCG/KG/MIN: 10 INJECTION, EMULSION INTRAVENOUS at 20:05

## 2024-05-07 RX ADMIN — Medication 500 MG: at 17:01

## 2024-05-07 RX ADMIN — LABETALOL HYDROCHLORIDE 10 MG: 5 INJECTION INTRAVENOUS at 23:19

## 2024-05-07 RX ADMIN — SODIUM CHLORIDE 5 MG/HR: 9 INJECTION, SOLUTION INTRAVENOUS at 00:25

## 2024-05-07 RX ADMIN — POTASSIUM CHLORIDE 20 MEQ: 29.8 INJECTION, SOLUTION INTRAVENOUS at 09:56

## 2024-05-07 RX ADMIN — LABETALOL HYDROCHLORIDE 10 MG: 5 INJECTION INTRAVENOUS at 23:35

## 2024-05-07 RX ADMIN — Medication 500 MG: at 20:07

## 2024-05-07 RX ADMIN — Medication 500 MG: at 09:56

## 2024-05-07 RX ADMIN — PROPOFOL 40 MCG/KG/MIN: 10 INJECTION, EMULSION INTRAVENOUS at 06:27

## 2024-05-07 RX ADMIN — FENTANYL CITRATE 25 MCG: 50 INJECTION INTRAMUSCULAR; INTRAVENOUS at 12:42

## 2024-05-07 RX ADMIN — PROPOFOL 45 MCG/KG/MIN: 10 INJECTION, EMULSION INTRAVENOUS at 03:34

## 2024-05-07 RX ADMIN — ACETAMINOPHEN 500 MG: 325 TABLET ORAL at 06:36

## 2024-05-07 RX ADMIN — FENTANYL CITRATE 25 MCG: 50 INJECTION INTRAMUSCULAR; INTRAVENOUS at 00:00

## 2024-05-07 RX ADMIN — 0.12% CHLORHEXIDINE GLUCONATE 15 ML: 1.2 RINSE ORAL at 16:00

## 2024-05-07 RX ADMIN — POLYVINYL ALCOHOL, POVIDONE 1 DROP: 14; 6 SOLUTION/ DROPS OPHTHALMIC at 06:36

## 2024-05-07 RX ADMIN — LEVETIRACETAM 500 MG: 500 SOLUTION ORAL at 20:07

## 2024-05-07 RX ADMIN — FOLIC ACID 1 MG: 1 TABLET ORAL at 20:07

## 2024-05-07 RX ADMIN — METOPROLOL TARTRATE 25 MG: 25 TABLET, FILM COATED ORAL at 08:00

## 2024-05-07 RX ADMIN — ACETAMINOPHEN 500 MG: 325 TABLET ORAL at 12:17

## 2024-05-07 RX ADMIN — SODIUM CHLORIDE 50 ML/HR: 3 INJECTION, SOLUTION INTRAVENOUS at 16:55

## 2024-05-07 RX ADMIN — POLYVINYL ALCOHOL, POVIDONE 1 DROP: 14; 6 SOLUTION/ DROPS OPHTHALMIC at 15:02

## 2024-05-07 RX ADMIN — POLYVINYL ALCOHOL, POVIDONE 1 DROP: 14; 6 SOLUTION/ DROPS OPHTHALMIC at 22:37

## 2024-05-07 RX ADMIN — ATORVASTATIN CALCIUM 80 MG: 40 TABLET, FILM COATED ORAL at 20:08

## 2024-05-07 RX ADMIN — 0.12% CHLORHEXIDINE GLUCONATE 15 ML: 1.2 RINSE ORAL at 09:57

## 2024-05-07 RX ADMIN — LEVOTHYROXINE SODIUM 125 MCG: 0.12 TABLET ORAL at 08:02

## 2024-05-07 RX ADMIN — FENTANYL CITRATE 100 MCG: 50 INJECTION, SOLUTION INTRAMUSCULAR; INTRAVENOUS at 18:39

## 2024-05-07 RX ADMIN — SODIUM CHLORIDE, PRESERVATIVE FREE 10 ML: 5 INJECTION INTRAVENOUS at 20:08

## 2024-05-07 RX ADMIN — ACETAMINOPHEN 500 MG: 325 TABLET ORAL at 18:11

## 2024-05-07 RX ADMIN — Medication 100 MG: at 08:00

## 2024-05-07 RX ADMIN — POLYVINYL ALCOHOL, POVIDONE 1 DROP: 14; 6 SOLUTION/ DROPS OPHTHALMIC at 11:03

## 2024-05-07 RX ADMIN — DULOXETINE HYDROCHLORIDE 60 MG: 30 CAPSULE, DELAYED RELEASE ORAL at 08:00

## 2024-05-07 RX ADMIN — SODIUM CHLORIDE, PRESERVATIVE FREE 10 ML: 5 INJECTION INTRAVENOUS at 08:03

## 2024-05-07 RX ADMIN — LOSARTAN POTASSIUM 100 MG: 50 TABLET, FILM COATED ORAL at 08:00

## 2024-05-07 RX ADMIN — PROPOFOL 30 MCG/KG/MIN: 10 INJECTION, EMULSION INTRAVENOUS at 11:02

## 2024-05-07 RX ADMIN — POLYVINYL ALCOHOL, POVIDONE 1 DROP: 14; 6 SOLUTION/ DROPS OPHTHALMIC at 20:07

## 2024-05-07 RX ADMIN — POLYVINYL ALCOHOL, POVIDONE 1 DROP: 14; 6 SOLUTION/ DROPS OPHTHALMIC at 04:38

## 2024-05-07 RX ADMIN — FOLIC ACID 1 MG: 1 TABLET ORAL at 08:00

## 2024-05-07 RX ADMIN — Medication 50 MCG/HR: at 18:56

## 2024-05-07 RX ADMIN — PROPOFOL 50 MCG/KG/MIN: 10 INJECTION, EMULSION INTRAVENOUS at 22:56

## 2024-05-07 RX ADMIN — LEVETIRACETAM 500 MG: 500 SOLUTION ORAL at 09:56

## 2024-05-07 RX ADMIN — FENTANYL CITRATE 100 MCG: 50 INJECTION INTRAMUSCULAR; INTRAVENOUS at 18:39

## 2024-05-07 RX ADMIN — FAMOTIDINE 10 MG: 40 POWDER, FOR SUSPENSION ORAL at 08:03

## 2024-05-07 RX ADMIN — ACETAMINOPHEN 500 MG: 325 TABLET ORAL at 23:37

## 2024-05-07 RX ADMIN — HYDRALAZINE HYDROCHLORIDE 25 MG: 25 TABLET ORAL at 13:30

## 2024-05-07 RX ADMIN — AMLODIPINE BESYLATE 10 MG: 10 TABLET ORAL at 08:00

## 2024-05-07 RX ADMIN — FAMOTIDINE 10 MG: 40 POWDER, FOR SUSPENSION ORAL at 20:08

## 2024-05-07 RX ADMIN — FENTANYL CITRATE 25 MCG: 50 INJECTION INTRAMUSCULAR; INTRAVENOUS at 18:08

## 2024-05-07 RX ADMIN — LABETALOL HYDROCHLORIDE 10 MG: 5 INJECTION INTRAVENOUS at 00:04

## 2024-05-07 RX ADMIN — SODIUM CHLORIDE 5 MG/HR: 9 INJECTION, SOLUTION INTRAVENOUS at 11:27

## 2024-05-07 RX ADMIN — PROPOFOL 35 MCG/KG/MIN: 10 INJECTION, EMULSION INTRAVENOUS at 17:27

## 2024-05-07 ASSESSMENT — PULMONARY FUNCTION TESTS
PIF_VALUE: 19
PIF_VALUE: 17
PIF_VALUE: 13
PIF_VALUE: 16
PIF_VALUE: 18
PIF_VALUE: 21
PIF_VALUE: 19
PIF_VALUE: 15
PIF_VALUE: 17
PIF_VALUE: 22
PIF_VALUE: 16
PIF_VALUE: 12
PIF_VALUE: 15
PIF_VALUE: 13
PIF_VALUE: 12
PIF_VALUE: 15
PIF_VALUE: 15
PIF_VALUE: 16
PIF_VALUE: 15
PIF_VALUE: 15
PIF_VALUE: 16
PIF_VALUE: 19
PIF_VALUE: 15
PIF_VALUE: 15
PIF_VALUE: 22
PIF_VALUE: 16

## 2024-05-07 NOTE — CARE COORDINATION
Patient intubated. Spoke with son, Melvin on the phone. He asked me to speak with him when he returns to the hospital later today. Will follow up.     For questions I can be reached at [phone]. MARY Langley      
Spoke with patient's mother and HCPOA, she was able to be on speaker phone during family discussion with Dr. Bonilla. She will be in tomorrow to see patient and speak with children. She is aware she is primary decision maker but plans to include children in decisions. She feels patient would likely prefer comfort care in light of her current condition. She would like to discuss further with patient's children tomorrow. She would like to be contacted by cell number in the future.  I did add that to emergency contacts as it was not previously available to us.     For questions I can be reached at [phone]. MARY Langley      
- Child  [phone]    Supplemental (Other) Decision Maker: Nicci Wiseman - Child - [phone]    Discharge Planning:    Patient lives with:   Type of Home:    Primary Care Giver: Self  Patient Support Systems include: Children   Current Financial resources:    Current community resources:    Current services prior to admission:              Current DME:              Type of Home Care services:       ADLS  Prior functional level: Independent in ADLs/IADLs  Current functional level: Assistance with the following:, Mobility, Shopping, Bathing, Dressing, Toileting, Feeding, Housework, Cooking    PT AM-PAC:   /24  OT AM-PAC:   /24    Family can provide assistance at DC: Yes  Would you like Case Management to discuss the discharge plan with any other family members/significant others, and if so, who? Yes (NOK)  Plans to Return to Present Housing: No  Other Identified Issues/Barriers to RETURNING to current housing: weakness  Potential Assistance needed at discharge:              Potential DME:    Patient expects to discharge to:    Plan for transportation at discharge:      Financial    Payor: [employer] / Plan: [employer] - CHOICE PLU / Product Type: *No Product type* /     Does insurance require precert for SNF: Yes    Potential assistance Purchasing Medications:    Meds-to-Beds request:        [id number] - [facility] [address] [facility] - P [phone] - F [phone]  [address] [place] [id number]  Phone: [phone] Fax: [phone]    [id number] - [place], [place] - [address] [facility] - P [phone] - F [phone]  [address] [place], [place] [address]  Phone: [phone] Fax: [phone]      Notes:    Factors facilitating achievement of predicted outcomes: Family support    Barriers to discharge: Upper extremity weakness and Lower extremity weakness    Additional Case Management Notes:     The Plan for Transition of Care is related to the

## 2024-05-07 NOTE — CONSULTS
Palliative Care Department  [phone]  Palliative Care Initial Consult  Provider KASSANDRA Jean - CNP      PATIENT: Allyson Wiseman  : 1960  MRN: [id number]  ADMISSION DATE: 2024  8:45 PM  Referring Provider: Sharon Taylor APRN - CNS     Palliative Medicine was consulted on hospital day 2 for assistance with Goals of care:  Stroke post TNK hemorrhagic conversion    HPI:     Clinical Summary:Allyson Wiseman is a 63 y.o. y/o female with a history of aberrant right subclavian artery, altered bowel elimination due to interstitial ostomy, anemia, arthritis, Crohn's disease, fatty liver, hernia, DVT, PE, hyperlipidemia, hypertension, hypothyroidism, obesity, thyroid disease, uterine fibrosis, who presented to [facility] on 2024 strokelike symptoms.  NIH score was 4, with altered mentation post IV tenecteplase, CT head showed new multifocal hemorrhage.  She was given TXA to reverse TNK, and transferred to [facility] for further neuro management.    ASSESSMENT/PLAN:     Pertinent Hospital Diagnoses     Acute ischemic stroke  Recurrent hemorrhage with right frontal parietal hemorrhage ICH  Hypertensive emergency      Palliative Care Encounter / Counseling Regarding Goals of Care  Please see detailed goals of care discussion as below  At this time, Allyson Wiseman, Does Not have capacity for medical decision-making.  Capacity is time limited and situation/question specific  During encounter Mother Paz  was surrogate medical decision-maker  Outcome of goals of care meeting:  Was able to get a hold of patient's mother, Paz, goal is for continue current medical treatment, until she arrives tomorrow, however if patient's conditions worsen, and the patient had a cardiac arrest, Paz would not want patient to be resuscitated.  CODE STATUS changed to DNR CCA    Patient's adult children do not believe their mother would have wanted to be resuscitated given current condition, 
Neurosurgery Consult Note      CHIEF COMPLAINT: Reason for neurosurgical consultation is multifocal intracranial subarachnoid hemorrhage following thrombolytic treatment of ischemic stroke    HPI:Allyson Wiseman is a 63 y.o. female who presents to the neuro ICU for multifocal ICH following thrombolytic administration at OSH. The patient has been admitted to the hospital since 5/5/2024 for treatment of strokelike symptoms.  Patient is currently intubated and unable to provide any history.  Per chart review: Patient arrived to OSH with reported right-sided weakness, NIH score was 4.  Mental status reported to change following administration of IV tenecteplase, CT head at that time showed new multifocal hemorrhages.  She was given TXA to reverse the TNK.  She was ultimately transferred to [facility] for neuro ICU management.   History and physical reviewed consults reviewed films reviewed examined the patient discussed the case extensively with the nurses at the bedside.    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)     Anemia     Arthritis     Crohn disease (HCC)     Fatty liver     Hernia     History of DVT (deep vein thrombosis) 6/10/2015    History of pulmonary embolus (PE) 6/10/2015    Hyperlipemia     Hypertension     Hypothyroidism     Intervertebral lumbar disc disorder with myelopathy, lumbar region     Leukocytosis     Migraine variant with headache 1/26/2022    Movement disorder     MRSA infection     UTI    Obesity     Osteoarthritis of both knees     Palpitations     PE (pulmonary embolism)     Primary hypertension 9/3/2022    Rash     fine skin rash not itchy gastro dr aware    Subclavian vein stenosis     Syncope     Thyroid disease     Uterine fibroid     Vitamin D deficiency      Past Surgical History:   Procedure Laterality Date    ABDOMEN SURGERY  11/2/14    I&d abdominal wound    
\  STROKE NEUROLOGY CONSULT NOTE    Consult requested by:   Attending: Dario Can DO   Reason for Consultation: STROKE /STROKE LIKE SYMPTOMS    Patient:   Allyson Wiseman   : 1960   MRN: [id number]   Date of Service: 2024     Impression  1  Acute ischemic stroke s/p TNK s/p Hemorrhagic conversion with SAH  2. Recurrent hemorrhage last night 2024 with right fronto parietal hemorrhage ICH score 1  3. Hypertensive emergency           Recommendations      Symptommatic TNK associated hemorrhagic conversion HI 2 with SAH  Uncontrolled blood pressure refractory to multiple agents    Discussed in detail with son at bedside personally, best and worst case scenarios were explained.   Patient has had a history of difficult to control BP addressed by her PCP per son.   Patient even in the icu during conversation has multiple runs requiring labetalol boluses    Overall -in summary I explained to him that the only situation where I see a meaningful recovery is if she would improve by herself in terms of neurological function.  And even in this scenario I do think she will require a trach and a PEG.  Will obtain an EEG today    At this moment continue to observe progression     Nicardipine to used if needed for smoother control   SBP target to be shifted to less than 140 to give her the best medical management     History of Present Illness:    This is a 63-year-old female who comes in today after being transferred from [facility].  Patient was a stroke alert at [facility] yesterday.  Patient received TNK.  After this patient blood pressure persistently high and refractory to intravenous antihypertensives reportedly.  Patient ended up with an acute alteration of awareness and CT head was repeated which showed diffuse subarachnoid hemorrhage.  Has some 1+ TXA was given for reversal.  Patient was then taken for stat MRI after discussion with my partner Dr. Villanueva.  I have taken over service this morning.  
the lip  Eyes: Pupils 5 mm, reactive, nystagmus present  Lungs/Chest: Mechanically ventilated  Heart: Warm throughout. Regular rate   Abdomen:  Soft, no grimace to deep palpation, nondistended, reducible soft hernia present  Extremities: Extremities warm to touch, pink, with no edema.    LABS:  CBC  Recent Labs     05/05/24  1645   WBC 12.3*   HGB 15.3   HCT 46.9        BMP  Recent Labs     05/05/24  1437      K 3.7   *   CO2 23   BUN 11   CREATININE 1.0   CALCIUM 10.3*     Liver Function  Recent Labs     05/05/24  1437   BILITOT 0.6   AST 24   ALT 18   ALKPHOS 58     No results for input(s): \"LACTATE\" in the last 72 hours.  Recent Labs     05/05/24  1437   INR 1.1         RADIOLOGY  I reviewed all relevant imaging from this admission as well as the past studies available in the EMR including: Outside hospital CTA head and neck, CT head, brain MRI and subsequent CT head.  My interpretation includes limited MRI study secondary to motion artifact, CT head with multifocal intraparenchymal hemorrhages, more extensive on the right frontal lobe, but present in both lobes      I have personally reviewed all relevant labs and imaging.     ASSESSMENT / PLAN:  63 y.o. female with multifocal IPH/SAH postthrombolytic administration for CVA    Neuro: GCS of 8 off of sedation, multifocal IPH/SAH   Given TXA for reversal prior to transfer   Will order stat TEG   Start Keppra 500 twice daily   Start propofol for sedation   Central line and initiate 3% hypertonic saline   Follow-up neurology recommendations   Follow-up stat CT head   Home Cymbalta   Neurochecks per unit protocol  CV: Hypertensive   As needed labetalol/hydralazine   Insert arterial line  Pulm: Mechanically ventilated, arrived on 100% FiO2 and appears to be oxygenating fine   Will order stat ABG   Mechanical ventilation bundle   Wean O2 as able to maintain SpO2 greater than 92%   Daily chest x-rays  GI: N.p.o.   Will insert NG tube and confirm

## 2024-05-07 NOTE — ACP (ADVANCE CARE PLANNING)
Advance Care Planning   Healthcare Decision Maker:    Primary Decision Maker: Paz Haider - Parent - [phone]    Secondary Decision Maker: Nicci Wiseman - Child - [phone]    Supplemental (Other) Decision Maker: Nura Wisemanis - Child - [phone]    Click here to complete Healthcare Decision Makers including selection of the Healthcare Decision Maker Relationship (ie \"Primary\").           HCPOA brought to room yesterday evening. Copy placed in soft chart. Updated contacts as delineated on HCPOA forms.

## 2024-05-08 ENCOUNTER — APPOINTMENT (OUTPATIENT)
Dept: GENERAL RADIOLOGY | Age: 64
DRG: 064 | End: 2024-05-08
Attending: INTERNAL MEDICINE
Payer: COMMERCIAL

## 2024-05-08 ENCOUNTER — TELEPHONE (OUTPATIENT)
Dept: PRIMARY CARE CLINIC | Age: 64
End: 2024-05-08

## 2024-05-08 VITALS
WEIGHT: 236.99 LBS | OXYGEN SATURATION: 76 % | HEART RATE: 85 BPM | BODY MASS INDEX: 43.61 KG/M2 | SYSTOLIC BLOOD PRESSURE: 106 MMHG | TEMPERATURE: 101.7 F | DIASTOLIC BLOOD PRESSURE: 60 MMHG | HEIGHT: 62 IN

## 2024-05-08 LAB
AADO2: 567.6 MMHG
AADO2: 596.7 MMHG
AADO2: 611.6 MMHG
B.E.: -6.6 MMOL/L (ref -3–3)
B.E.: -6.7 MMOL/L (ref -3–3)
B.E.: -6.7 MMOL/L (ref -3–3)
COHB: 0.3 % (ref 0–1.5)
COHB: 0.3 % (ref 0–1.5)
COHB: 0.5 % (ref 0–1.5)
CRITICAL: ABNORMAL
DATE ANALYZED: ABNORMAL
DATE OF COLLECTION: ABNORMAL
FIO2: 100 %
HCO3: 20.9 MMOL/L (ref 22–26)
HCO3: 21.3 MMOL/L (ref 22–26)
HCO3: 21.9 MMOL/L (ref 22–26)
HHB: 19.9 % (ref 0–5)
HHB: 5.1 % (ref 0–5)
HHB: 8.9 % (ref 0–5)
LAB: ABNORMAL
Lab: 300
Lab: 40
Lab: 415
METHB: 0.4 % (ref 0–1.5)
METHB: 0.4 % (ref 0–1.5)
METHB: 0.5 % (ref 0–1.5)
MICROORGANISM SPEC CULT: NORMAL
MODE: AC
O2 CONTENT: 14.5 ML/DL
O2 CONTENT: 16.8 ML/DL
O2 CONTENT: 18.3 ML/DL
O2 SATURATION: 79.9 % (ref 92–98.5)
O2 SATURATION: 91 % (ref 92–98.5)
O2 SATURATION: 94.9 % (ref 92–98.5)
O2HB: 79.2 % (ref 94–97)
O2HB: 90.4 % (ref 94–97)
OPERATOR ID: 1893
OPERATOR ID: 2577
OPERATOR ID: 7221
PATIENT TEMP: 37 C
PCO2: 49.6 MMHG (ref 35–45)
PCO2: 53.6 MMHG (ref 35–45)
PCO2: 56.6 MMHG (ref 35–45)
PEEP/CPAP: 10 CMH2O
PEEP/CPAP: 8 CMH2O
PEEP/CPAP: 8 CMH2O
PFO2: 0.48 MMHG/%
PFO2: 0.67 MMHG/%
PFO2: 0.89 MMHG/%
PH BLOOD GAS: 7.21 (ref 7.35–7.45)
PH BLOOD GAS: 7.22 (ref 7.35–7.45)
PH BLOOD GAS: 7.24 (ref 7.35–7.45)
PO2: 47.8 MMHG (ref 75–100)
PO2: 66.7 MMHG (ref 75–100)
PO2: 88.8 MMHG (ref 75–100)
RI(T): 12.8
RI(T): 6.39
RI(T): 8.95
RR MECHANICAL: 16 B/MIN
RR MECHANICAL: 22 B/MIN
RR MECHANICAL: 22 B/MIN
SOURCE, BLOOD GAS: ABNORMAL
SPECIMEN DESCRIPTION: NORMAL
THB: 13 G/DL (ref 11.5–16.5)
THB: 13.2 G/DL (ref 11.5–16.5)
THB: 13.8 G/DL (ref 11.5–16.5)
TIME ANALYZED: 306
TIME ANALYZED: 416
TIME ANALYZED: 43
VT MECHANICAL: 350 ML

## 2024-05-08 PROCEDURE — 6370000000 HC RX 637 (ALT 250 FOR IP): Performed by: STUDENT IN AN ORGANIZED HEALTH CARE EDUCATION/TRAINING PROGRAM

## 2024-05-08 PROCEDURE — 94003 VENT MGMT INPAT SUBQ DAY: CPT

## 2024-05-08 PROCEDURE — 6370000000 HC RX 637 (ALT 250 FOR IP): Performed by: NURSE PRACTITIONER

## 2024-05-08 PROCEDURE — 6360000002 HC RX W HCPCS: Performed by: STUDENT IN AN ORGANIZED HEALTH CARE EDUCATION/TRAINING PROGRAM

## 2024-05-08 PROCEDURE — 2500000003 HC RX 250 WO HCPCS: Performed by: STUDENT IN AN ORGANIZED HEALTH CARE EDUCATION/TRAINING PROGRAM

## 2024-05-08 PROCEDURE — 2580000003 HC RX 258: Performed by: STUDENT IN AN ORGANIZED HEALTH CARE EDUCATION/TRAINING PROGRAM

## 2024-05-08 PROCEDURE — 82805 BLOOD GASES W/O2 SATURATION: CPT

## 2024-05-08 RX ORDER — LORAZEPAM 2 MG/ML
1 INJECTION INTRAMUSCULAR EVERY 6 HOURS PRN
Status: DISCONTINUED | OUTPATIENT
Start: 2024-05-08 | End: 2024-05-08 | Stop reason: HOSPADM

## 2024-05-08 RX ORDER — GLYCOPYRROLATE 0.2 MG/ML
0.2 INJECTION INTRAMUSCULAR; INTRAVENOUS EVERY 4 HOURS PRN
Status: DISCONTINUED | OUTPATIENT
Start: 2024-05-08 | End: 2024-05-08 | Stop reason: HOSPADM

## 2024-05-08 RX ORDER — NOREPINEPHRINE BITARTRATE 0.06 MG/ML
.01-3.3 INJECTION, SOLUTION INTRAVENOUS CONTINUOUS
Status: DISCONTINUED | OUTPATIENT
Start: 2024-05-08 | End: 2024-05-08

## 2024-05-08 RX ORDER — SODIUM CHLORIDE 9 MG/ML
INJECTION, SOLUTION INTRAVENOUS CONTINUOUS
Status: DISCONTINUED | OUTPATIENT
Start: 2024-05-08 | End: 2024-05-08 | Stop reason: HOSPADM

## 2024-05-08 RX ORDER — NOREPINEPHRINE BITARTRATE 0.06 MG/ML
INJECTION, SOLUTION INTRAVENOUS
Status: DISCONTINUED
Start: 2024-05-08 | End: 2024-05-08 | Stop reason: HOSPADM

## 2024-05-08 RX ORDER — 0.9 % SODIUM CHLORIDE 0.9 %
1000 INTRAVENOUS SOLUTION INTRAVENOUS ONCE
Status: DISCONTINUED | OUTPATIENT
Start: 2024-05-08 | End: 2024-05-08

## 2024-05-08 RX ORDER — MORPHINE SULFATE/PF 50 MG/50ML
.5-1 PATIENT CONTROLLED ANALGESIA SYRINGE INTRAVENOUS CONTINUOUS
Status: DISCONTINUED | OUTPATIENT
Start: 2024-05-08 | End: 2024-05-08

## 2024-05-08 RX ADMIN — VASOPRESSIN 0.04 UNITS/MIN: 20 INJECTION INTRAVENOUS at 01:28

## 2024-05-08 RX ADMIN — LORAZEPAM 1 MG: 2 INJECTION INTRAMUSCULAR; INTRAVENOUS at 06:45

## 2024-05-08 RX ADMIN — GLYCOPYRROLATE 0.2 MG: 0.2 INJECTION INTRAMUSCULAR; INTRAVENOUS at 06:45

## 2024-05-08 RX ADMIN — Medication 0.09 MCG/KG/MIN: at 01:34

## 2024-05-08 RX ADMIN — Medication 150 MCG/HR: at 01:46

## 2024-05-08 RX ADMIN — 0.12% CHLORHEXIDINE GLUCONATE 15 ML: 1.2 RINSE ORAL at 02:37

## 2024-05-08 RX ADMIN — HYDROMORPHONE HYDROCHLORIDE 0.5 MG: 1 INJECTION, SOLUTION INTRAMUSCULAR; INTRAVENOUS; SUBCUTANEOUS at 06:45

## 2024-05-08 RX ADMIN — POLYVINYL ALCOHOL, POVIDONE 1 DROP: 14; 6 SOLUTION/ DROPS OPHTHALMIC at 02:37

## 2024-05-08 RX ADMIN — OXYCODONE HYDROCHLORIDE 5 MG: 5 TABLET ORAL at 02:37

## 2024-05-08 ASSESSMENT — PULMONARY FUNCTION TESTS
PIF_VALUE: 26
PIF_VALUE: 28
PIF_VALUE: 26
PIF_VALUE: 27
PIF_VALUE: 32
PIF_VALUE: 32
PIF_VALUE: 33

## 2024-05-08 NOTE — PLAN OF CARE
Problem: Discharge Planning  Goal: Discharge to home or other facility with appropriate resources  Outcome: Progressing     Problem: Pain  Goal: Verbalizes/displays adequate comfort level or baseline comfort level  Outcome: Progressing     Problem: Safety - Adult  Goal: Free from fall injury  Outcome: Progressing     Problem: Safety - Medical Restraint  Goal: Remains free of injury from restraints (Restraint for Interference with Medical Device)  Description: INTERVENTIONS:  1. Determine that other, less restrictive measures have been tried or would not be effective before applying the restraint  2. Evaluate the patient's condition at the time of restraint application  3. Inform patient/family regarding the reason for restraint  4. Q2H: Monitor safety, psychosocial status, comfort, nutrition and hydration  Outcome: Progressing     Problem: Skin/Tissue Integrity  Goal: Absence of new skin breakdown  Description: 1.  Monitor for areas of redness and/or skin breakdown  2.  Assess vascular access sites hourly  3.  Every 4-6 hours minimum:  Change oxygen saturation probe site  4.  Every 4-6 hours:  If on nasal continuous positive airway pressure, respiratory therapy assess nares and determine need for appliance change or resting period.  Outcome: Progressing     Problem: ABCDS Injury Assessment  Goal: Absence of physical injury  Outcome: Progressing     
  Problem: Discharge Planning  Goal: Discharge to home or other facility with appropriate resources  Outcome: Progressing  Flowsheets (Taken 5/6/2024 2054)  Discharge to home or other facility with appropriate resources: Identify barriers to discharge with patient and caregiver     Problem: Pain  Goal: Verbalizes/displays adequate comfort level or baseline comfort level  Outcome: Progressing  Flowsheets (Taken 5/6/2024 2054)  Verbalizes/displays adequate comfort level or baseline comfort level: Assess pain using appropriate pain scale     Problem: Safety - Adult  Goal: Free from fall injury  Outcome: Progressing  Flowsheets (Taken 5/6/2024 2054)  Free From Fall Injury: Instruct family/caregiver on patient safety     Problem: Safety - Medical Restraint  Goal: Remains free of injury from restraints (Restraint for Interference with Medical Device)  Description: INTERVENTIONS:  1. Determine that other, less restrictive measures have been tried or would not be effective before applying the restraint  2. Evaluate the patient's condition at the time of restraint application  3. Inform patient/family regarding the reason for restraint  4. Q2H: Monitor safety, psychosocial status, comfort, nutrition and hydration  5/7/2024 2021 by Nina Rodriguez RN  Outcome: Progressing  Flowsheets (Taken 5/7/2024 0800 by Tatiana Fernández, RN)  Remains free of injury from restraints (restraint for interference with medical device):   Determine that other, less restrictive measures have been tried or would not be effective before applying the restraint   Evaluate the patient's condition at the time of restraint application   Inform patient/family regarding the reason for restraint   Every 2 hours: Monitor safety, psychosocial status, comfort, nutrition and hydration  5/7/2024 1200 by Tatiana Fernández RN  Outcome: Progressing  Flowsheets (Taken 5/7/2024 0800)  Remains free of injury from restraints (restraint for interference with medical 
  Problem: Neurosensory - Adult  Goal: Achieves stable or improved neurological status  Outcome: Progressing  Flowsheets (Taken 5/6/2024 1037)  Achieves stable or improved neurological status:   Assess for and report changes in neurological status   Initiate measures to prevent increased intracranial pressure   Maintain blood pressure and fluid volume within ordered parameters to optimize cerebral perfusion and minimize risk of hemorrhage  Goal: Achieves maximal functionality and self care  Outcome: Progressing     Problem: Respiratory - Adult  Goal: Achieves optimal ventilation and oxygenation  Outcome: Progressing  Flowsheets (Taken 5/6/2024 1037)  Achieves optimal ventilation and oxygenation:   Assess for changes in respiratory status   Assess for changes in mentation and behavior   Position to facilitate oxygenation and minimize respiratory effort     Problem: Cardiovascular - Adult  Goal: Maintains optimal cardiac output and hemodynamic stability  Outcome: Progressing  Goal: Absence of cardiac dysrhythmias or at baseline  Outcome: Progressing     Problem: Metabolic/Fluid and Electrolytes - Adult  Goal: Electrolytes maintained within normal limits  Outcome: Progressing     
  Problem: Respiratory - Adult  Goal: Achieves optimal ventilation and oxygenation  5/6/2024 1646 by Destiny Trinh RCP  Outcome: Progressing     
  Problem: Safety - Medical Restraint  Goal: Remains free of injury from restraints (Restraint for Interference with Medical Device)  Description: INTERVENTIONS:  1. Determine that other, less restrictive measures have been tried or would not be effective before applying the restraint  2. Evaluate the patient's condition at the time of restraint application  3. Inform patient/family regarding the reason for restraint  4. Q2H: Monitor safety, psychosocial status, comfort, nutrition and hydration  5/6/2024 1038 by Tatiana Fernández RN  Outcome: Progressing  Flowsheets (Taken 5/6/2024 0800)  Remains free of injury from restraints (restraint for interference with medical device):   Determine that other, less restrictive measures have been tried or would not be effective before applying the restraint   Evaluate the patient's condition at the time of restraint application   Inform patient/family regarding the reason for restraint   Every 2 hours: Monitor safety, psychosocial status, comfort, nutrition and hydration  5/5/2024 2239 by Azalia Leggett, RN  Outcome: Progressing     
  Problem: Safety - Medical Restraint  Goal: Remains free of injury from restraints (Restraint for Interference with Medical Device)  Description: INTERVENTIONS:  1. Determine that other, less restrictive measures have been tried or would not be effective before applying the restraint  2. Evaluate the patient's condition at the time of restraint application  3. Inform patient/family regarding the reason for restraint  4. Q2H: Monitor safety, psychosocial status, comfort, nutrition and hydration  Outcome: Progressing  Flowsheets (Taken 5/7/2024 0800)  Remains free of injury from restraints (restraint for interference with medical device):   Determine that other, less restrictive measures have been tried or would not be effective before applying the restraint   Evaluate the patient's condition at the time of restraint application   Inform patient/family regarding the reason for restraint   Every 2 hours: Monitor safety, psychosocial status, comfort, nutrition and hydration     Problem: Neurosensory - Adult  Goal: Achieves stable or improved neurological status  5/7/2024 1210 by Tatiana Fernández RN  Outcome: Progressing  Flowsheets (Taken 5/6/2024 1037)  Achieves stable or improved neurological status:   Assess for and report changes in neurological status   Initiate measures to prevent increased intracranial pressure   Maintain blood pressure and fluid volume within ordered parameters to optimize cerebral perfusion and minimize risk of hemorrhage  5/7/2024 1200 by Tatiana Fernández RN  Outcome: Progressing  Flowsheets (Taken 5/6/2024 1037)  Achieves stable or improved neurological status:   Assess for and report changes in neurological status   Initiate measures to prevent increased intracranial pressure   Maintain blood pressure and fluid volume within ordered parameters to optimize cerebral perfusion and minimize risk of hemorrhage  Goal: Achieves maximal functionality and self care  Outcome: Progressing  Flowsheets 
  Problem: Safety - Medical Restraint  Goal: Remains free of injury from restraints (Restraint for Interference with Medical Device)  Description: INTERVENTIONS:  1. Determine that other, less restrictive measures have been tried or would not be effective before applying the restraint  2. Evaluate the patient's condition at the time of restraint application  3. Inform patient/family regarding the reason for restraint  4. Q2H: Monitor safety, psychosocial status, comfort, nutrition and hydration  Outcome: Progressing  Flowsheets (Taken 5/7/2024 0800)  Remains free of injury from restraints (restraint for interference with medical device):   Determine that other, less restrictive measures have been tried or would not be effective before applying the restraint   Evaluate the patient's condition at the time of restraint application   Inform patient/family regarding the reason for restraint   Every 2 hours: Monitor safety, psychosocial status, comfort, nutrition and hydration     Problem: Neurosensory - Adult  Goal: Achieves stable or improved neurological status  Outcome: Progressing  Flowsheets (Taken 5/6/2024 1037)  Achieves stable or improved neurological status:   Assess for and report changes in neurological status   Initiate measures to prevent increased intracranial pressure   Maintain blood pressure and fluid volume within ordered parameters to optimize cerebral perfusion and minimize risk of hemorrhage  Goal: Achieves maximal functionality and self care  Outcome: Progressing  Flowsheets (Taken 5/6/2024 2054 by Nina Rodriguez, RN)  Achieves maximal functionality and self care: Monitor swallowing and airway patency with patient fatigue and changes in neurological status     Problem: Cardiovascular - Adult  Goal: Maintains optimal cardiac output and hemodynamic stability  Outcome: Progressing  Flowsheets (Taken 5/7/2024 1200)  Maintains optimal cardiac output and hemodynamic stability:   Monitor blood pressure and 
(Taken 5/6/2024 2054)  Absence of seizures:   Monitor for seizure activity.  If seizure occurs, document type and location of movements and any associated apnea   If seizure occurs, turn head to side and suction secretions as needed  Goal: Achieves maximal functionality and self care  5/6/2024 2054 by Nina Rodriguez RN  Outcome: Progressing  Flowsheets (Taken 5/6/2024 2054)  Achieves maximal functionality and self care: Monitor swallowing and airway patency with patient fatigue and changes in neurological status  5/6/2024 1037 by Tatiana Fernández RN  Outcome: Progressing     Problem: Respiratory - Adult  Goal: Achieves optimal ventilation and oxygenation  5/6/2024 2054 by Nina Rodriguez RN  Outcome: Progressing  Flowsheets (Taken 5/6/2024 1037 by Tatiana Fernández RN)  Achieves optimal ventilation and oxygenation:   Assess for changes in respiratory status   Assess for changes in mentation and behavior   Position to facilitate oxygenation and minimize respiratory effort  5/6/2024 1646 by Destiny Trinh RCP  Outcome: Progressing  5/6/2024 1037 by Tatiana Fernández RN  Outcome: Progressing  Flowsheets (Taken 5/6/2024 1037)  Achieves optimal ventilation and oxygenation:   Assess for changes in respiratory status   Assess for changes in mentation and behavior   Position to facilitate oxygenation and minimize respiratory effort     Problem: Cardiovascular - Adult  Goal: Maintains optimal cardiac output and hemodynamic stability  5/6/2024 2054 by Nina Rodriguez RN  Outcome: Progressing  Flowsheets (Taken 5/6/2024 2054)  Maintains optimal cardiac output and hemodynamic stability: Monitor blood pressure and heart rate  5/6/2024 1037 by Tatiana Fernández RN  Outcome: Progressing  Goal: Absence of cardiac dysrhythmias or at baseline  5/6/2024 2054 by Nina Rodriguez RN  Outcome: Progressing  Flowsheets (Taken 5/6/2024 2054)  Absence of cardiac dysrhythmias or at baseline: Monitor cardiac rate and rhythm  5/6/2024 1037 by Pradeep 
Achieves maximal functionality and self care  5/8/2024 0900 by Tatiana Fernández RN  Outcome: Completed  5/7/2024 2021 by Nina Rodriguez RN  Outcome: Progressing  Flowsheets (Taken 5/6/2024 2054)  Achieves maximal functionality and self care: Monitor swallowing and airway patency with patient fatigue and changes in neurological status     Problem: Respiratory - Adult  Goal: Achieves optimal ventilation and oxygenation  5/8/2024 0900 by Tatiana Fernández RN  Outcome: Completed  5/7/2024 2021 by Nina Rodriguez RN  Outcome: Progressing  Flowsheets (Taken 5/6/2024 1037 by Tatiana Fernández RN)  Achieves optimal ventilation and oxygenation:   Assess for changes in respiratory status   Assess for changes in mentation and behavior   Position to facilitate oxygenation and minimize respiratory effort     Problem: Cardiovascular - Adult  Goal: Maintains optimal cardiac output and hemodynamic stability  5/8/2024 0900 by Tatiana Fernández RN  Outcome: Completed  5/7/2024 2021 by Nina Rodriguez RN  Outcome: Progressing  Flowsheets (Taken 5/7/2024 1200 by Tatiana Fernández RN)  Maintains optimal cardiac output and hemodynamic stability:   Monitor blood pressure and heart rate   Assess for signs of decreased cardiac output   Administer fluid and/or volume expanders as ordered  Goal: Absence of cardiac dysrhythmias or at baseline  5/8/2024 0900 by Tatiana Fernández RN  Outcome: Completed  5/7/2024 2021 by Nina Rodriguez RN  Outcome: Progressing  Flowsheets (Taken 5/6/2024 2054)  Absence of cardiac dysrhythmias or at baseline: Monitor cardiac rate and rhythm     Problem: Infection - Adult  Goal: Absence of infection at discharge  5/8/2024 0900 by Tatiana Fernández RN  Outcome: Completed  5/7/2024 2021 by Nina Rodriguez RN  Outcome: Progressing  Flowsheets (Taken 5/6/2024 2054)  Absence of infection at discharge:   Monitor lab/diagnostic results   Assess and monitor for signs and symptoms of infection     Problem: Metabolic/Fluid and Electrolytes -

## 2024-05-08 NOTE — SIGNIFICANT EVENT
Critical care attending note    I met with the family overnight and early this morning.  The patient's family expressed their wishes to transition to comfort measures only.  Code status changed to DNR-comfort care and orders placed.    Dario Tomas MD

## 2024-05-08 NOTE — PROCEDURES
Patient was suctioned orally and endotracheally prior to being extubated per order and family wishes to 4 liters/min via nasal cannula. Stridor was not present post extubation.       Performed by  Deneen Jones

## 2024-05-08 NOTE — PROGRESS NOTES
05/06/24 1642   Patient Observation   Pulse (!) 110   Respirations (!) 33   SpO2 97 %   Vent Information   Vent Mode AC/VC   Ventilator Settings   Vt (Set, mL) 300 mL   Resp Rate (Set) 14 bpm   PEEP/CPAP (cmH2O) 8   FiO2  45 %   Vent Patient Data (Readings)   Vt (Measured) 315 mL   Peak Inspiratory Pressure (cmH2O) 14 cmH2O   Rate Measured 33 br/min   Minute Volume (L/min) 9.71 Liters   Mean Airway Pressure (cmH2O) 6 cmH20   Plateau Pressure (cm H2O) 13 cm H2O   Driving Pressure 5   I:E Ratio 1:2.20   Flow Sensitivity 3 L/min   Static Compliance (L/cm H2O) 37   Backup Apnea On   Backup Rate 14 Breaths Per Minute   Backup Vt 30   Vent Alarm Settings   High Pressure (cmH2O) 40 cmH2O   Low Minute Volume (lpm) 4 L/min   High Minute Volume (lpm) 14 L/min   Low Exhaled Vt (ml) 200 mL   High Exhaled Vt (ml) 700 mL   RR High (bpm) 40 br/min   Apnea (secs) 20 secs   Additional Respiratoray Assessments   Humidification Source Heated wire   Humidification Temp 37   Circuit Condensation Drained   Ambu Bag With Mask At Bedside Yes   ETT    Placement Date/Time: 05/05/24 1843   Present on Admission/Arrival: No  Placed By: In ED  Placement Verified By: Auscultation;Capnometry  Preoxygenation: Yes  Mask Ventilation: Ventilated by mask (1)  Technique: Video laryngoscopy  Airway Type: Cuffed ...   Secured At 23 cm   Measured From Lips   Secured By Commercial tube thornton   Site Assessment Dry       
   05/06/24 2111   Patient Observation   Pulse 91   Respirations 26   SpO2 99 %   Vent Information   Vent Mode AC/VC   Ventilator Settings   Vt (Set, mL) 300 mL   Resp Rate (Set) 14 bpm   PEEP/CPAP (cmH2O) 8   FiO2  45 %   Vent Patient Data (Readings)   Vt (Measured) 328 mL   Peak Inspiratory Pressure (cmH2O) 14 cmH2O   Rate Measured 27 br/min   Minute Volume (L/min) 9.34 Liters   Mean Airway Pressure (cmH2O) 9 cmH20   Plateau Pressure (cm H2O) 13 cm H2O   Driving Pressure 5   I:E Ratio 1:2.20   Flow Sensitivity 3 L/min   Backup Apnea On   Backup Rate 14 Breaths Per Minute   Backup Vt 300   Vent Alarm Settings   High Pressure (cmH2O) 40 cmH2O   Low Minute Volume (lpm) 4 L/min   High Minute Volume (lpm) 14 L/min   Low Exhaled Vt (ml) 200 mL   High Exhaled Vt (ml) 700 mL   RR High (bpm) 40 br/min   Apnea (secs) 20 secs   Additional Respiratoray Assessments   Humidification Source Heated wire   Humidification Temp 37   Circuit Condensation Drained   Ambu Bag With Mask At Bedside Yes   ETT    Placement Date/Time: 05/05/24 1843   Present on Admission/Arrival: No  Placed By: In ED  Placement Verified By: Auscultation;Capnometry  Preoxygenation: Yes  Mask Ventilation: Ventilated by mask (1)  Technique: Video laryngoscopy  Airway Type: Cuffed ...   Secured At 23 cm   Measured From Lips   Secured By Commercial tube thornton   Site Assessment Dry       
   05/08/24 0422   Patient Observation   Pulse 87   Respirations 24   SpO2 (!) 88 %   Vent Information   Vent Mode AC/VC   Ventilator Settings   Vt (Set, mL) (S)  400 mL   Resp Rate (Set) 26 bpm   PEEP/CPAP (cmH2O) (S)  10   FiO2  100 %   Vent Patient Data (Readings)   Vt (Measured) 412 mL   Peak Inspiratory Pressure (cmH2O) 32 cmH2O   Rate Measured 26 br/min   Minute Volume (L/min) 10.6 Liters   Mean Airway Pressure (cmH2O) 16 cmH20   Plateau Pressure (cm H2O) 24 cm H2O   Driving Pressure 14   I:E Ratio 1:2.70   Vent Alarm Settings   High Pressure (cmH2O) 45 cmH2O     Patient changed per order   
  Speech Language Pathology  NAME:  Allyson Wiseman  :  1960  DATE: 2024  ROOM[facility]  [facility]A      Pt intubated.   Will await re-orders  Thank You        
 Allyson Wiseman is a 63 y.o. right handed female     Patient was transferred from [facility] where she presented with acute onset of right-sided numbness.  Her NIHSS on arrival to ED was 4-for right arm ataxia, right heel-to-shin ataxia, right arm drift and right leg weakness.    Telestroke was notified and she was deemed a TNK candidate    After TNK was administered her blood pressure was persistently high and refractory to intravenous antihypertensive reportedly.  Shortly after TNK was administered she developed altered mentation.  CT of the head was obtained which demonstrated diffuse subarachnoid hemorrhage.  She was provided TXA.    She was taken for stat MRI and transferred to [facility] [place].    Blood pressure fluctuations continued repeat CT of head was obtained this morning which demonstrated diffuse ischemic disease.    She remains sedated-no family present    Allergies as of 05/05/2024 - Fully Reviewed 05/05/2024   Allergen Reaction Noted    Biaxin [clarithromycin] Hives and Rash 09/04/2012    Cefaclor Hives and Rash 09/04/2012    Clavulanic acid Rash 08/14/2013    Doxycycline Rash 02/10/2020    Macrobid [nitrofurantoin] Nausea And Vomiting 06/27/2015       Objective:     /73   Pulse 57   Temp 99 °F (37.2 °C) (Bladder)   Resp 24   Ht 1.575 m (5' 2\")   Wt 107.5 kg (236 lb 15.9 oz)   SpO2 96%   BMI 43.35 kg/m²      General appearance: Not awake not following command  Head: Normocephalic, without obvious abnormality, atraumatic  Extremities: no cyanosis or edema  Pulses: 2+ and symmetric  Skin: no rashes or lesions    Mental Status: Not awake not following command    On vent    Cranial Nerves:  I: smell    II: visual acuity     II: visual fields No threat   II: pupils Miotic but appears reactive   III,VII: ptosis    III,IV,VI: extraocular muscles  Doll's not present   V: mastication    V: facial light touch sensation     V,VII: corneal reflex  Not present   VII: facial muscle function - 
4 Eyes Skin Assessment     NAME:  Allyson Wiseman  YOB: 1960  MEDICAL RECORD NUMBER:  [id number]    The patient is being assessed for  Admission    I agree that at least one RN has performed a thorough Head to Toe Skin Assessment on the patient. ALL assessment sites listed below have been assessed.      Areas assessed by both nurses:    Head, Face, Ears, Shoulders, Back, Chest, Arms, Elbows, Hands, Sacrum. Buttock, Coccyx, Ischium, Legs. Feet and Heels, and Under Medical Devices         Does the Patient have a Wound? Yes wound(s) were present on assessment. LDA wound assessment was Initiated and completed by RN       Ricki Prevention initiated by RN: Yes  Wound Care Orders initiated by RN: Yes    Pressure Injury (Stage 3,4, Unstageable, DTI, NWPT, and Complex wounds) if present, place Wound referral order by RN under : No    New Ostomies, if present place, Ostomy referral order under : No     Nurse 1 eSignature: Electronically signed by Nina Rodriguez RN on 5/6/24 at 3:53 AM EDT    **SHARE this note so that the co-signing nurse can place an eSignature**    Nurse 2 eSignature: Electronically signed by Azalia Leggett RN on 5/6/24 at 3:55 AM EDT   
Comprehensive Nutrition Assessment    Type and Reason for Visit:  Initial, Consult (TF Recs)    Nutrition Recommendations/Plan:   Continue NPO.   Modify Current EN to appropriately meet current estimated needs & monitor.    TF Recommendations:  Peptide Based (Vital AF 1.2) @ 55ml/hr (Goal) Continuous x 24hr/d (30min hold pre/post Synthroid)= 1265ml TV, 1518kcal, 95gm Pro, 1026ml freewater.     Flush per critical care mgmt.    TF Recommendations w/ current rate of Propofol will meet ~100% of current estimated Kcal/Protein needs at this time.     Malnutrition Assessment:  Malnutrition Status:  At risk for malnutrition (Comment) (05/07/24 2365)    Context:  Acute Illness     Findings of the 6 clinical characteristics of malnutrition:  Energy Intake:  Mild decrease in energy intake (Comment) (since adm)  Weight Loss:  Unable to assess (2/2 poor EMR wt hx pta)     Body Fat Loss:  No significant body fat loss     Muscle Mass Loss:  No significant muscle mass loss    Fluid Accumulation:  No significant fluid accumulation     Strength:  Not Performed    Nutrition Assessment:    Pt adm from OSH w/ stroke like symptoms and Rt-sided ataxia just pta.  PMHx obesity, crohn's dz, EC fistula s/p PEG/Colectomy/Jejunostomy w/ reversal (14'-15'), fatty liver, HTN, HLD.  Adm w/ CVA, AHRF, IPH, SAH, hypertensive emergency, s/p intubation/OG-insert 5/6.  At risk d/t poor PO intake w/ NPO status and need for vent/EN support.  Will provide goal EN rec's to appropriately meet current estimated needs and monitor.    Nutrition Related Findings:    intubated/sedated, MAP 78, no pressors, Abd/BS WDL, +OG w/ TF at goal, no edema, +I/O's, hyperglycemia, hypo K+/phos Wound Type: None       Current Nutrition Intake & Therapies:    Average Meal Intake: NPO  Average Supplements Intake: NPO  Diet NPO  ADULT TUBE FEEDING; Orogastric; Standard with Fiber; Continuous; 15; Yes; 15; Q 4 hours; 30; 30; Q 4 hours  Current Tube Feeding (TF) 
Dr. Tomas notified of Na+ level and increased UOP. New verbal orders received.   
EEG completed.  Report to follow.  Renetta Manzanares    
[facility]  SURGICAL INTENSIVE CARE UNIT (SICU)  ATTENDING PHYSICIAN CRITICAL CARE PROGRESS NOTE     I have personally examined the patient, personally reviewed the record, and personally discussed the case with the resident. I have personally reviewed all relevant labs and imaging data. I am actively managing this patient's medications.  Please refer to the resident's note. I agree with the assessment and plan with the following corrections/additions. The following summarizes my clinical findings and independent assessment.     CC: critical care management for ICH after TNK    Hospital Course/Overnight Events:  5/5--transferred here from OSH afte given TNK for stroke-like symptoms; developed ICH/SAH  5/6--sedation held this AM    Medications   Scheduled Meds:    chlorhexidine  15 mL Mouth/Throat BID    Polyvinyl Alcohol-Povidone PF  1 drop Both Eyes Q4H    Or    artificial tears   Both Eyes Q4H    folic acid  1 mg Per NG tube BID    atorvastatin  80 mg Per NG tube Nightly    levETIRAcetam  500 mg IntraVENous Q12H    losartan  100 mg Per NG tube Daily    metoprolol tartrate  25 mg Per NG tube BID    thiamine  100 mg Per NG tube Daily    topiramate  25 mg Per NG tube BID    levothyroxine  125 mcg Per NG tube Daily    [Held by provider] DULoxetine  60 mg Oral Daily    insulin lispro  0-4 Units SubCUTAneous Q4H    famotidine  10 mg Per NG tube BID    sodium chloride flush  5-40 mL IntraVENous 2 times per day     Continuous Infusions:    dextrose      sodium chloride      propofol 30 mcg/kg/min (05/06/24 0627)    sodium chloride 75 mL/hr at 05/06/24 0627    3% sodium chloride 25 mL/hr (05/06/24 0614)     PRN Meds: glucose, dextrose bolus **OR** dextrose bolus, glucagon (rDNA), dextrose, hydrALAZINE, labetalol, acetaminophen, polyethylene glycol, sodium chloride flush, sodium chloride, ondansetron **OR** ondansetron    Physical Examination      Vitals:  BP (!) 150/77   Pulse 86   Temp (!) 100.9 °F 
[facility]  SURGICAL INTENSIVE CARE UNIT (SICU)  ATTENDING PHYSICIAN CRITICAL CARE PROGRESS NOTE     I have personally examined the patient, personally reviewed the record, and personally discussed the case with the resident. I have personally reviewed all relevant labs and imaging data. I am actively managing this patient's medications.  Please refer to the resident's note. I agree with the assessment and plan with the following corrections/additions. The following summarizes my clinical findings and independent assessment.     CC: critical care management for ICH after TNK    Hospital Course/Overnight Events:  5/5--transferred here from OSH afte given TNK for stroke-like symptoms; developed ICH/SAH  5/6--sedation held this AM  5/7--nothing new overnight    Medications   Scheduled Meds:    hydrALAZINE  25 mg Per NG tube 3 times per day    levETIRAcetam  500 mg Per NG tube BID    potassium & sodium phosphates  2 packet Per NG tube 4x Daily    Polyvinyl Alcohol-Povidone PF  1 drop Both Eyes Q4H    Or    artificial tears   Both Eyes Q4H    folic acid  1 mg Per NG tube BID    atorvastatin  80 mg Per NG tube Nightly    losartan  100 mg Per NG tube Daily    metoprolol tartrate  25 mg Per NG tube BID    thiamine  100 mg Per NG tube Daily    levothyroxine  125 mcg Per NG tube Daily    insulin lispro  0-4 Units SubCUTAneous Q4H    famotidine  10 mg Per NG tube BID    amLODIPine  10 mg Per NG tube Daily    DULoxetine  60 mg Per G Tube Daily    acetaminophen  500 mg Oral 4 times per day    chlorhexidine  15 mL Mouth/Throat Q6H    sodium chloride flush  5-40 mL IntraVENous 2 times per day     Continuous Infusions:    3% sodium chloride 50 mL/hr (05/07/24 1328)    dextrose      niCARdipine (CARDENE) 50 mg in sodium chloride 0.9 % 100 mL infusion 5 mg/hr (05/07/24 1127)    propofol 30 mcg/kg/min (05/07/24 1102)    sodium chloride       PRN Meds: glucose, dextrose bolus **OR** dextrose bolus, glucagon (rDNA), 
Neuro Science Intensive Care Unit  Critical Care  Daily Progress Note 5/6/2024    Date of Admission: 05/05/2024    CC:  Follow up for stroke    HOSPITAL COURSE/OVERNIGHT EVENTS:     63 y.o. woman presents to the neuro ICU for multifocal ICH following thrombolytic administration at OSH. The patient has been admitted to the hospital since 5/5/2024 for treatment of strokelike symptoms.  Patient is currently intubated and unable to provide any history.  Per chart review: Patient arrived to OSH with reported right-sided weakness, NIH score was 4.  Mental status reported to change following administration of IV tenecteplase, CT head at that time showed new multifocal hemorrhages.  She was given TXA to reverse the TNK.  She was ultimately transferred to [facility] for neuro ICU management.   5/6  No new issues. Intubated. On Propofol & hypertonic saline.  Required more than 11 doses of antihypertensive agents.  Blood sugar stable-did not require any insulin coverage.      PHYSICAL EXAM:   BP (!) 150/77   Pulse 81   Temp (!) 100.9 °F (38.3 °C) (Bladder)   Resp 20   Ht 1.575 m (5' 2\")   Wt 107.1 kg (236 lb 1.8 oz)   SpO2 99%   BMI 43.19 kg/m²     Intake/Output Summary (Last 24 hours) at 5/6/2024 1010  Last data filed at 5/6/2024 1000  Gross per 24 hour   Intake 429.98 ml   Output 718 ml   Net -288.02 ml      General appearance:  Comfortable.     NEUROLOGIC:   RASS Score:  -2  GCS:  6T.   1 - Does not open eyes   4 - Moves part of body but does not remove noxious stimulus  1 - Makes no noise       Pupil size:  Left 4 mm  Right 4 mm  Pupil reaction: Yes   PERRLA  Wiggles fingers: Left   No  Right No  Hand grasp:   Left: none.   Right  none  Wiggles toes: Left   No Right   No   Plantar flexion:  Left  none Right   none  Facial droop:   none      CONSTITUTIONAL: No acute distress, lying in hospital bed.    CARDIOVASCULAR: Monitor: NSR.  S1 S2.  Regular rate, regular rhythm.  .  No 
Neuro Science Intensive Care Unit  Critical Care  Daily Progress Note 5/7/2024    Date of Admission: 05/05/2024    CC:  Follow up for stroke    HOSPITAL COURSE/OVERNIGHT EVENTS:     63 y.o. woman presents to the neuro ICU for multifocal ICH following thrombolytic administration at OSH. The patient has been admitted to the hospital since 5/5/2024 for treatment of strokelike symptoms.  Patient is currently intubated and unable to provide any history.  Per chart review: Patient arrived to OSH with reported right-sided weakness, NIH score was 4.  Mental status reported to change following administration of IV tenecteplase, CT head at that time showed new multifocal hemorrhages.  She was given TXA to reverse the TNK.  She was ultimately transferred to [facility] for neuro ICU management.   5/6  No new issues. Intubated. On Propofol & hypertonic saline.  Required more than 11 doses of antihypertensive agents.  Blood sugar stable-did not require any insulin coverage.    5/7 No issues overnight.  Remains intubated on propofol.  3% sodium & Cardene continues.  Did not require insulin coverage.  Bradycardic today.      PHYSICAL EXAM:   /62   Pulse 61   Temp 100 °F (37.8 °C) (Bladder)   Resp 26   Ht 1.575 m (5' 2\")   Wt 107.5 kg (236 lb 15.9 oz)   SpO2 97%   BMI 43.35 kg/m²     Intake/Output Summary (Last 24 hours) at 5/7/2024 0723  Last data filed at 5/7/2024 0641  Gross per 24 hour   Intake 2710.81 ml   Output 1143 ml   Net 1567.81 ml      General appearance:  Comfortable.     NEUROLOGIC:   RASS Score:  -2  GCS:  6T.   1 - Does not open eyes   4 - Moves part of body but does not remove noxious stimulus  1 - Makes no noise       Pupil size:  Left 4 mm  Right 4 mm  Pupil reaction: Yes   PERRLA  Wiggles fingers: Left   No  Right No  Hand grasp:   Left: none.   Right  none  Wiggles toes: Left   No Right   No   Plantar flexion:  Left  none Right   none  Facial droop:   none      CONSTITUTIONAL: No acute 
Notified Dr. Tomas of new onset low BP. New verbal orders received.   
OCCUPATIONAL THERAPY    Date:2024  Patient Name: Allyson Wiseman  MRN: [id number]  : 1960  Room: [address] [place] [address]A              Chart reviewed. Pt on hold for medical status.  Will re-attempt at later time. Thank you for consult.    Jessi Lacey, OTR/L [id number]     
OCCUPATIONAL THERAPY    Date:2024  Patient Name: Allyson Wiseman  MRN: [id number]  : 1960  Room: [address] [place] [address]A              Chart reviewed. Pt not appropriate for therapy at this time. Will discontinue OT order. Please re-order as indicated. Thank you.     Jessi Lacey, OTR/L [id number]     
Palliative consult complete  
Patient:   Allyson Wiseman   : 1960   MRN: [id number]   Date of Service: 2024      Impression  1  Acute ischemic stroke s/p TNK s/p Hemorrhagic conversion with SAH  2. Recurrent hemorrhage last night 2024 with right fronto parietal hemorrhage ICH score 1  3. Hypertensive emergency   4. Bicortical infarction from reversal TXA ( most likely )           Recommendations        I have had a 30-minute conversation with 4 family members in person and mother over the phone and speaker along with critical care team ICU RN and .  Overall given bicortical infarction patient has severe ischemic injury.  Prognosis is guarded meaningful recovery is limited.  Family are agreeable to a palliative care consultation        Subjective  Not doing much still intubated    Objective  Decorticate posturing identified L>R  On the ventilator  Brain stem reflexes intact     Labs reviewed  Imaging- Bi cortical infarction - extensive  EEG- no active seizures  
Per [employer]:     NS 75ml/hr  Fentanyl 15ml/hr  Vasopressin 12 ml/hr  Norepinephrine 9.4 ml/hr  
Per order, dropped RR to 14, VT to 300 (6ml/kg), peep to 8.   
Physical Therapy  Physical Therapy Attempt    Name: Allyson Wiseman  : 1960  MRN: [id number]      Date of Service: 2024  Chart reviewed.  Pt is not medically appropriate for skilled PT at this time.  Will re-attempt as able.    José Luis Jaime, PT, DPT  [id number]      
Physical Therapy  Physical Therapy Attempt    Name: Allyson Wiseman  : 1960  MRN: [id number]      Date of Service: 2024  Chart reviewed.  Pt is not appropriate for skilled PT at this time.  Per  note, family to decide on further care tomorrow.  Will discontinue PT order at this time.  Re-consult when appropriate and able to participate in skilled interventions.    José Luis Jaime, PT, DPT  [id number]      
Pt TOD 0703. Admitting physician notified.     [facility]oners office notified and released body, attending can sign death certificate.     Family has not decided on  home as of yet.   
When completing mouth care this RN noticed that pts front tooth had been broken off seemingly from biting on bite block of ETT. This RN removed the broken tooth from pts mouth without incident.  
INR 1.1 09/02/2022    INR 1.2 06/27/2015    PROTIME 12.6 (H) 05/05/2024    PROTIME 11.5 09/02/2022    PROTIME 12.6 (H) 06/27/2015       Assessment:    Principal Problem:    Stroke determined by clinical assessment ([facility])  Active Problems:    Acute CVA (cerebrovascular accident) (HCC)    Acute respiratory failure with hypoxia and hypercapnia (HCC)    Oropharyngeal dysphagia    Electrolyte imbalance    Adverse effect of tissue plasminogen activator (tPA)    Hypertensive emergency    Intraparenchymal hemorrhage of brain (HCC)    Subarachnoid hemorrhage (HCC)  Resolved Problems:    * No resolved hospital problems. *      Plan:  Correct electrolytes specialist input noted and appreciated palliative care to see        Dario Can DO  2:57 PM  5/7/2024    
suspected or confirmed emergency medical condition->Emergency Medical Condition ([place]) FINDINGS: Only diffusion-weighted images could be obtained because of the patient's severely altered mental status and inability to cooperate. There is mild patient motion on the images, but the study is diagnostic. INTRACRANIAL STRUCTURES/VENTRICLES: There is no sign of restricted diffusion suggesting acute infarction. No mass effect or midline shift. The known subarachnoid hemorrhage and the right frontoparietal subcortical hemorrhage cannot be identified on the diffusion-weighted images The ventricles and sulci are normal in size and configuration.     1. Very limited examination because of the patient's altered mental status and inability to hold still.  Only diffusion-weighted images are obtained. 2. No sign of acute infarction. 3. The known subarachnoid hemorrhage and the right frontoparietal subcortical hemorrhage cannot be identified on the diffusion-weighted images.     CT HEAD WO CONTRAST    Result Date: 5/5/2024  EXAMINATION: CT OF THE HEAD WITHOUT CONTRAST  5/5/2024 4:15 pm TECHNIQUE: CT of the head was performed without the administration of intravenous contrast. Automated exposure control, iterative reconstruction, and/or weight based adjustment of the mA/kV was utilized to reduce the radiation dose to as low as reasonably achievable. COMPARISON: May 5, 2024 HISTORY: ORDERING SYSTEM PROVIDED HISTORY: slurring words, tnk given TECHNOLOGIST PROVIDED HISTORY: Reason for exam:->slurring words, tnk given Has a \"code stroke\" or \"stroke alert\" been called?->No Decision Support Exception - unselect if not a suspected or confirmed emergency medical condition->Emergency Medical Condition ([place]) FINDINGS: New areas of subarachnoid hemorrhage in sulci of right and left frontal lobes temporal lobes, right and left sylvian fissures and minimal hemorrhage in left parietal sulci.  Additional more confluent areas of hemorrhage are

## 2024-05-08 NOTE — TELEPHONE ENCOUNTER
Advised Dr. Mace will sign death certificate.  [place]  Home will drop off.  
Rachael  home calling asking if you will sign death cert. Patient passed at the hospital this morning   
Tell them yes  
mother/father

## 2024-05-10 ENCOUNTER — TELEPHONE (OUTPATIENT)
Dept: PRIMARY CARE CLINIC | Age: 64
End: 2024-05-10

## 2024-05-15 NOTE — PROGRESS NOTES
EEG is completely normal.   MRI is Friday.   Im working on an urgent referral to neuro TOTAL KNEE ARTHROPLASTY Bilateral 2013    TUBAL LIGATION      TUNNELED VENOUS CATHETER PLACEMENT        Vitals:    09/05/19 0806   BP: 136/84   Temp: 98.3 °F (36.8 °C)   Weight: 264 lb (119.7 kg)   Height: 5' 3\" (1.6 m)       Objective:    Physical Exam   Constitutional: She is oriented to person, place, and time. She appears well-developed and well-nourished. HENT:   Head: Normocephalic. Eyes: Pupils are equal, round, and reactive to light. EOM are normal.   Neck: Normal range of motion. Cardiovascular: Normal rate and regular rhythm. Pulmonary/Chest: Effort normal and breath sounds normal.   Abdominal: Soft. A hernia (huge ventral hernia  no chg) is present. Musculoskeletal:   Para lumbar spasm with marked  Dec rom   Neurological: She is alert and oriented to person, place, and time. Skin: Skin is warm. Psychiatric: She has a normal mood and affect. Her behavior is normal.   Vitals reviewed. Diagnoses and all orders for this visit:    Encounter for annual general medical examination with abnormal findings in adult    Crohn's disease of colon without complication ([facility] [address]    Essential hypertension    Acquired hypothyroidism    Dermatitis    Ventral hernia without obstruction or gangrene  -     Surinder Cornelius MD, General Surgery, [facility]        Comments: diet exer lose wt   Hm issues-----   appt dr Mary Beth Lema with [facility]     lsoe wt  Low sugar  Diet    Dc carbs   A great deal of time spent reviewing medications, diet, exercise, social issues. Also reviewing the chart before entering the room with patient and finishing charting after leaving patient's room. More than half of that time was spent face to face with the patient in counseling and coordinating care.     Follow Up: Return in about 6 months (around 3/5/2020) for lab  beffore---see  referral.     Seen by:  Carrol Dakin, DO